# Patient Record
Sex: FEMALE | Race: WHITE | Employment: FULL TIME | URBAN - METROPOLITAN AREA
[De-identification: names, ages, dates, MRNs, and addresses within clinical notes are randomized per-mention and may not be internally consistent; named-entity substitution may affect disease eponyms.]

---

## 2019-06-13 ENCOUNTER — OFFICE VISIT (OUTPATIENT)
Dept: PODIATRY | Age: 54
End: 2019-06-13
Payer: COMMERCIAL

## 2019-06-13 VITALS — BODY MASS INDEX: 24.84 KG/M2 | HEIGHT: 62 IN | WEIGHT: 135 LBS

## 2019-06-13 DIAGNOSIS — Q66.222 METATARSUS ADDUCTUS OF LEFT FOOT: Primary | ICD-10-CM

## 2019-06-13 DIAGNOSIS — Q66.221 METATARSUS ADDUCTUS OF RIGHT FOOT: ICD-10-CM

## 2019-06-13 DIAGNOSIS — M79.672 PAIN IN LEFT FOOT: ICD-10-CM

## 2019-06-13 DIAGNOSIS — M79.671 PAIN IN RIGHT FOOT: ICD-10-CM

## 2019-06-13 PROCEDURE — 99203 OFFICE O/P NEW LOW 30 MIN: CPT | Performed by: PODIATRIST

## 2019-06-13 RX ORDER — CYCLOBENZAPRINE HCL 10 MG
TABLET ORAL
Refills: 2 | COMMUNITY
Start: 2019-05-19 | End: 2019-07-16

## 2019-06-13 RX ORDER — ALPRAZOLAM 1 MG/1
TABLET ORAL
Refills: 0 | COMMUNITY
Start: 2019-05-23 | End: 2019-07-16

## 2019-06-13 RX ORDER — PHENOBARBITAL 64.8 MG/1
TABLET ORAL
Refills: 0 | COMMUNITY
Start: 2019-05-20 | End: 2019-07-16

## 2019-06-13 RX ORDER — IBUPROFEN 800 MG/1
TABLET ORAL
Refills: 2 | COMMUNITY
Start: 2019-05-19 | End: 2019-12-02 | Stop reason: SDUPTHER

## 2019-06-13 RX ORDER — SUMATRIPTAN 100 MG/1
TABLET, FILM COATED ORAL
Refills: 2 | COMMUNITY
Start: 2019-05-19 | End: 2019-07-16

## 2019-06-13 ASSESSMENT — ENCOUNTER SYMPTOMS
NAUSEA: 0
SHORTNESS OF BREATH: 0
COLOR CHANGE: 0
DIARRHEA: 0
BACK PAIN: 0

## 2019-06-13 NOTE — PROGRESS NOTES
Comments)     Seizures      Phenytoin Sodium Extended Other (See Comments)     \"Causes seizure\"    Topiramate Nausea Only and Nausea And Vomiting     unknown    Verapamil Nausea Only and Nausea And Vomiting    Sulfamethoxazole-Trimethoprim Hives    Amoxicillin        History reviewed. No pertinent past medical history. Prior to Admission medications    Medication Sig Start Date End Date Taking? Authorizing Provider   SUMAtriptan (IMITREX) 100 MG tablet  5/19/19  Yes Historical Provider, MD   PHENobarbital (LUMINAL) 64.8 MG tablet TK 1 T PO BID 5/20/19  Yes Historical Provider, MD   ibuprofen (ADVIL;MOTRIN) 800 MG tablet TK 1 T PO TID PRN 5/19/19  Yes Historical Provider, MD   cyclobenzaprine (FLEXERIL) 10 MG tablet TK 1 T PO TID PRN 5/19/19  Yes Historical Provider, MD   ALPRAZolam Bailey Gasper) 1 MG tablet TK 1 T PO  BID PRN 5/23/19  Yes Historical Provider, MD       History reviewed. No pertinent surgical history. History reviewed. No pertinent family history. Social History     Tobacco Use    Smoking status: Never Smoker    Smokeless tobacco: Never Used   Substance Use Topics    Alcohol use: Not on file       Review of Systems   Constitutional: Negative for activity change, appetite change, chills, diaphoresis, fatigue and fever. Respiratory: Negative for shortness of breath. Cardiovascular: Negative for leg swelling. Gastrointestinal: Negative for diarrhea and nausea. Endocrine: Negative for cold intolerance, heat intolerance and polyuria. Musculoskeletal: Positive for arthralgias and gait problem. Negative for back pain, joint swelling and myalgias. Skin: Negative for color change, pallor, rash and wound. Allergic/Immunologic: Negative for environmental allergies and food allergies. Neurological: Negative for dizziness, weakness, light-headedness and numbness. Hematological: Does not bruise/bleed easily.    Psychiatric/Behavioral: Negative for behavioral problems, confusion and self-injury. The patient is not nervous/anxious. Vitals: There were no vitals filed for this visit. General: AAO x 3 in NAD. Integument: There are no rashes, ulcers, or breaks in the skin noted to the bilateral lower extremities. There is no induration, subcutaneous nodules, or tightening of the skin noted to the bilateral.     Toenails 1-5 of the right foot do not present with thickness, elongation, discoloration, brittleness, subungual debris. Toenails 1-5 of the left foot do not present with thickness, elongation, discoloration, brittleness, subungual debris. Interdigital maceration absent to web spaces 1-4, Bilateral.     There are no preulcerative lesions noted to the right foot. There are no preulcerative lesions noted to the left foot. The skin to the bilateral feet is not thin and shiny. The skin to the bilateral feet is  warm, supple, and dry. Vascular: DP pulse of the right foot is  palpable. DP pulse of the left foot is  palpable. PT pulse of the right foot is  palpable. PT pulse of the left foot is  palpable. CFT is less than 3 secs to the digits of the right foot. CFT is less than 3 secs to the digits of the left foot. There is no edema noted to the bilateral foot or ankle. There is hair growth noted to the digits of the bilateral feet. There are no varicosities noted to the right foot/ankle. There are no varicosities noted to the left foot/ankle. Erythema is absent to the bilateral feet. Neurological: Reflexes are present to the right plantar foot and to the Achilles tendon. Reflexes are present to the left plantar foot and to the Achilles tendon. Epicritic sensation is  intact to the right foot. Epicritic sensation is  intact to the left foot. Musculoskeletal:  Muscle strength is +5/5 to all four muscle groups of the right lower extremity and +5/5 to all four muscle groups of the left lower extremity. There adduction noted to the forefoot at the level of the tarsal-metatarsal joints bilaterally. This deformity is rigid bilaterally. There is pain with manipulation and attempt at reduction of this deformity bilaterally. Range of motion to the right ankle is  free of pain or grinding. Range of motion to the left ankle is  free of pain or grinding. Range of motion to the right subtalar joint is  free of pain or grinding. Range of motion to the left subtalar joint is  free of pain or grinding. No abnormalities, asymmetries, or misalignments are seen between the extremities. Weightbearing evaluation reveals a forefoot adduction and an increase in the medial longitudinal arch height. The digits of the right foot are contracted. The digits of the left foot are contracted. There is no prominence noted to the first metatarsal head without abduction of the hallux of the right foot. There is no prominence noted to the first metatarsal head without abduction of the hallux of the left foot. Shoe examination was performed. Biomechanical Exam: normal bilaterally. X-ray's taken: AP, Lateral, and Medial Oblique of the bilateral foot. Findings: There is adduction noted to the metatarsals of the bilateral feet. There is significant loss and incongruity to the joint spaces of the tarsal metatarsal joints and the tarsal joints of the bilateral feet. Asessment: Patient is a 46 y.o. female with:    Diagnosis Orders   1. Metatarsus adductus of left foot  XR FOOT LEFT (MIN 3 VIEWS)   2. Metatarsus adductus of right foot  XR FOOT RIGHT (MIN 3 VIEWS)   3. Pain in left foot  XR FOOT LEFT (MIN 3 VIEWS)   4. Pain in right foot  XR FOOT RIGHT (MIN 3 VIEWS)       Plan:  1. Clinical evaluation of the patient. 2. Patient informed that I recommend orthotics to aid in decreasing her pain.  Patient informed that I recommend accommodative rather than functional orthotics as functional orthotics will cause her more pain. I educated the patient as to the difference between these two types of orthotics. Patient informed that I do not recommend surgery as this would not really benefit her due to the diffuse arthritis that she has. Patient states that she understands this. Foam impressions of the patient's feet were made today for fabrication of the orthotics. Patient will be contacted when these have been received. 3. Contact office with any questions/problems/concerns. Return if symptoms worsen or fail to improve.    6/13/2019      Luis Fernando Diamond DPM

## 2019-07-16 ENCOUNTER — OFFICE VISIT (OUTPATIENT)
Dept: PRIMARY CARE CLINIC | Age: 54
End: 2019-07-16
Payer: COMMERCIAL

## 2019-07-16 VITALS
OXYGEN SATURATION: 97 % | HEIGHT: 62 IN | DIASTOLIC BLOOD PRESSURE: 80 MMHG | SYSTOLIC BLOOD PRESSURE: 132 MMHG | WEIGHT: 146.8 LBS | BODY MASS INDEX: 27.02 KG/M2 | HEART RATE: 82 BPM

## 2019-07-16 DIAGNOSIS — Z00.00 ANNUAL PHYSICAL EXAM: ICD-10-CM

## 2019-07-16 DIAGNOSIS — M15.9 PRIMARY OSTEOARTHRITIS INVOLVING MULTIPLE JOINTS: ICD-10-CM

## 2019-07-16 DIAGNOSIS — Z13.220 ENCOUNTER FOR LIPID SCREENING FOR CARDIOVASCULAR DISEASE: Primary | ICD-10-CM

## 2019-07-16 DIAGNOSIS — Z13.6 ENCOUNTER FOR LIPID SCREENING FOR CARDIOVASCULAR DISEASE: Primary | ICD-10-CM

## 2019-07-16 DIAGNOSIS — F41.1 GENERALIZED ANXIETY DISORDER: ICD-10-CM

## 2019-07-16 DIAGNOSIS — R63.5 UNEXPLAINED WEIGHT GAIN: ICD-10-CM

## 2019-07-16 DIAGNOSIS — M54.50 CHRONIC BILATERAL LOW BACK PAIN WITHOUT SCIATICA: ICD-10-CM

## 2019-07-16 DIAGNOSIS — G43.009 MIGRAINE WITHOUT AURA AND WITHOUT STATUS MIGRAINOSUS, NOT INTRACTABLE: ICD-10-CM

## 2019-07-16 DIAGNOSIS — G40.909 SEIZURE DISORDER (HCC): ICD-10-CM

## 2019-07-16 DIAGNOSIS — G89.29 CHRONIC BILATERAL LOW BACK PAIN WITHOUT SCIATICA: ICD-10-CM

## 2019-07-16 DIAGNOSIS — Z12.11 ENCOUNTER FOR SCREENING FOR MALIGNANT NEOPLASM OF COLON: ICD-10-CM

## 2019-07-16 PROBLEM — M15.0 PRIMARY OSTEOARTHRITIS INVOLVING MULTIPLE JOINTS: Status: ACTIVE | Noted: 2019-07-16

## 2019-07-16 PROCEDURE — 99204 OFFICE O/P NEW MOD 45 MIN: CPT | Performed by: FAMILY MEDICINE

## 2019-07-16 RX ORDER — CYCLOBENZAPRINE HCL 10 MG
TABLET ORAL
Qty: 60 TABLET | Refills: 2 | Status: SHIPPED | OUTPATIENT
Start: 2019-07-16 | End: 2019-12-02 | Stop reason: SDUPTHER

## 2019-07-16 RX ORDER — PHENOBARBITAL 64.8 MG/1
TABLET ORAL
Qty: 60 TABLET | Refills: 5 | Status: SHIPPED | OUTPATIENT
Start: 2019-07-16 | End: 2019-12-02 | Stop reason: SDUPTHER

## 2019-07-16 RX ORDER — SUMATRIPTAN 100 MG/1
100 TABLET, FILM COATED ORAL DAILY PRN
Qty: 9 TABLET | Refills: 5 | Status: SHIPPED | OUTPATIENT
Start: 2019-07-16 | End: 2019-12-02 | Stop reason: SDUPTHER

## 2019-07-16 RX ORDER — ALPRAZOLAM 1 MG/1
TABLET ORAL
Qty: 60 TABLET | Refills: 2 | Status: SHIPPED | OUTPATIENT
Start: 2019-07-16 | End: 2019-12-02 | Stop reason: SDUPTHER

## 2019-07-16 SDOH — HEALTH STABILITY: MENTAL HEALTH: HOW OFTEN DO YOU HAVE A DRINK CONTAINING ALCOHOL?: NEVER

## 2019-07-16 ASSESSMENT — ENCOUNTER SYMPTOMS
ABDOMINAL PAIN: 0
NAUSEA: 0
EYE DISCHARGE: 0
SHORTNESS OF BREATH: 0
DIARRHEA: 0
WHEEZING: 0
EYE REDNESS: 0
COUGH: 0
SORE THROAT: 0
RHINORRHEA: 0
VOMITING: 0

## 2019-07-16 ASSESSMENT — PATIENT HEALTH QUESTIONNAIRE - PHQ9
1. LITTLE INTEREST OR PLEASURE IN DOING THINGS: 0
2. FEELING DOWN, DEPRESSED OR HOPELESS: 0
SUM OF ALL RESPONSES TO PHQ QUESTIONS 1-9: 0
SUM OF ALL RESPONSES TO PHQ9 QUESTIONS 1 & 2: 0
SUM OF ALL RESPONSES TO PHQ QUESTIONS 1-9: 0

## 2019-07-17 ENCOUNTER — TELEPHONE (OUTPATIENT)
Dept: PODIATRY | Age: 54
End: 2019-07-17

## 2019-08-14 ENCOUNTER — HOSPITAL ENCOUNTER (OUTPATIENT)
Age: 54
Setting detail: SPECIMEN
Discharge: HOME | End: 2019-08-14
Payer: COMMERCIAL

## 2019-08-14 ENCOUNTER — OFFICE VISIT (OUTPATIENT)
Dept: PRIMARY CARE CLINIC | Age: 54
End: 2019-08-14
Payer: COMMERCIAL

## 2019-08-14 VITALS
OXYGEN SATURATION: 99 % | HEART RATE: 87 BPM | BODY MASS INDEX: 25.79 KG/M2 | TEMPERATURE: 98 F | DIASTOLIC BLOOD PRESSURE: 88 MMHG | SYSTOLIC BLOOD PRESSURE: 126 MMHG | WEIGHT: 141.2 LBS

## 2019-08-14 DIAGNOSIS — Z00.00 ANNUAL PHYSICAL EXAM: ICD-10-CM

## 2019-08-14 DIAGNOSIS — R63.5 UNEXPLAINED WEIGHT GAIN: ICD-10-CM

## 2019-08-14 DIAGNOSIS — Z13.220 ENCOUNTER FOR LIPID SCREENING FOR CARDIOVASCULAR DISEASE: ICD-10-CM

## 2019-08-14 DIAGNOSIS — Z13.6 ENCOUNTER FOR LIPID SCREENING FOR CARDIOVASCULAR DISEASE: ICD-10-CM

## 2019-08-14 DIAGNOSIS — J20.9 ACUTE BRONCHITIS, UNSPECIFIED ORGANISM: Primary | ICD-10-CM

## 2019-08-14 LAB
ANION GAP SERPL CALCULATED.3IONS-SCNC: 12 MMOL/L (ref 9–17)
BUN BLDV-MCNC: 4 MG/DL (ref 6–20)
BUN/CREAT BLD: ABNORMAL (ref 9–20)
CALCIUM SERPL-MCNC: 9 MG/DL (ref 8.6–10.4)
CHLORIDE BLD-SCNC: 101 MMOL/L (ref 98–107)
CHOLESTEROL, FASTING: 253 MG/DL
CHOLESTEROL/HDL RATIO: 3.4
CO2: 27 MMOL/L (ref 20–31)
CORTISOL COLLECTION INFO: 825
CORTISOL: 12.8 UG/DL (ref 2.7–18.4)
CREAT SERPL-MCNC: 0.47 MG/DL (ref 0.5–0.9)
GFR AFRICAN AMERICAN: >60 ML/MIN
GFR NON-AFRICAN AMERICAN: >60 ML/MIN
GFR SERPL CREATININE-BSD FRML MDRD: ABNORMAL ML/MIN/{1.73_M2}
GFR SERPL CREATININE-BSD FRML MDRD: ABNORMAL ML/MIN/{1.73_M2}
GLUCOSE FASTING: 81 MG/DL (ref 70–99)
HDLC SERPL-MCNC: 75 MG/DL
HEPATITIS C ANTIBODY: NONREACTIVE
LDL CHOLESTEROL: 162 MG/DL (ref 0–130)
POTASSIUM SERPL-SCNC: 4.1 MMOL/L (ref 3.7–5.3)
SODIUM BLD-SCNC: 140 MMOL/L (ref 135–144)
THYROXINE, FREE: 1.42 NG/DL (ref 0.93–1.7)
TRIGLYCERIDE, FASTING: 79 MG/DL
TSH SERPL DL<=0.05 MIU/L-ACNC: 0.58 MIU/L (ref 0.3–5)
VLDLC SERPL CALC-MCNC: ABNORMAL MG/DL (ref 1–30)

## 2019-08-14 PROCEDURE — 99213 OFFICE O/P EST LOW 20 MIN: CPT | Performed by: PHYSICIAN ASSISTANT

## 2019-08-14 RX ORDER — AZITHROMYCIN 250 MG/1
TABLET, FILM COATED ORAL
Qty: 1 PACKET | Refills: 0 | Status: SHIPPED | OUTPATIENT
Start: 2019-08-14 | End: 2019-08-24

## 2019-08-14 RX ORDER — MECLIZINE HCL 12.5 MG/1
12.5 TABLET ORAL 3 TIMES DAILY PRN
COMMUNITY
End: 2019-12-02 | Stop reason: SDUPTHER

## 2019-08-14 RX ORDER — BENZONATATE 200 MG/1
200 CAPSULE ORAL 3 TIMES DAILY PRN
Qty: 30 CAPSULE | Refills: 0 | Status: SHIPPED | OUTPATIENT
Start: 2019-08-14 | End: 2019-12-02

## 2019-08-14 ASSESSMENT — ENCOUNTER SYMPTOMS
WHEEZING: 0
SHORTNESS OF BREATH: 0
COUGH: 1
CHEST TIGHTNESS: 1

## 2019-08-14 NOTE — PROGRESS NOTES
(improved). Respiratory: Positive for cough and chest tightness. Negative for shortness of breath and wheezing. Objective:     /88   Pulse 87   Temp 98 °F (36.7 °C)   Wt 141 lb 3.2 oz (64 kg)   SpO2 99%   BMI 25.79 kg/m²   Physical Exam   Constitutional: She appears well-developed and well-nourished. No distress. HENT:   Head: Normocephalic and atraumatic. Right Ear: Tympanic membrane, external ear and ear canal normal.   Left Ear: Tympanic membrane, external ear and ear canal normal.   Nose: Right sinus exhibits no maxillary sinus tenderness and no frontal sinus tenderness. Left sinus exhibits no maxillary sinus tenderness and no frontal sinus tenderness. Mouth/Throat: No oropharyngeal exudate. Cardiovascular: Normal rate, regular rhythm and normal heart sounds. Pulmonary/Chest: Effort normal and breath sounds normal. No respiratory distress. She has no wheezes. Lymphadenopathy:     She has cervical adenopathy (left side). Skin: Skin is warm and dry. No rash noted. She is not diaphoretic. Nursing note and vitals reviewed. Assessment:       Diagnosis Orders   1. Acute bronchitis, unspecified organism  azithromycin (ZITHROMAX) 250 MG tablet    benzonatate (TESSALON) 200 MG capsule        Plan:    Rx for z-rosy and Tessalon Perles prn for cough. Return if symptoms worsen or fail to improve. No orders of the defined types were placed in this encounter. Orders Placed This Encounter   Medications    azithromycin (ZITHROMAX) 250 MG tablet     Si tablets now then 1 daily until gone. Dispense:  1 packet     Refill:  0    benzonatate (TESSALON) 200 MG capsule     Sig: Take 1 capsule by mouth 3 times daily as needed for Cough     Dispense:  30 capsule     Refill:  0       Patient given educationalmaterials - see patient instructions. Discussed use, benefit, and side effectsof prescribed medications. All patient questions answered. Pt voiced understanding. Reviewed health maintenance. Instructed to continue current medications, diet andexercise. Patient agreed with treatment plan. Follow up as directed.      Electronicallysigned by Hoda Thomas PA-C on 8/21/2019 at 1:57 AM

## 2019-08-21 ASSESSMENT — ENCOUNTER SYMPTOMS
SINUS PRESSURE: 0
RHINORRHEA: 1

## 2019-12-02 ENCOUNTER — HOSPITAL ENCOUNTER (OUTPATIENT)
Age: 54
Discharge: HOME | End: 2019-12-04
Payer: COMMERCIAL

## 2019-12-02 ENCOUNTER — HOSPITAL ENCOUNTER (OUTPATIENT)
Age: 54
Discharge: HOME | End: 2019-12-02
Payer: COMMERCIAL

## 2019-12-02 ENCOUNTER — OFFICE VISIT (OUTPATIENT)
Dept: PRIMARY CARE CLINIC | Age: 54
End: 2019-12-02
Payer: COMMERCIAL

## 2019-12-02 ENCOUNTER — HOSPITAL ENCOUNTER (OUTPATIENT)
Dept: GENERAL RADIOLOGY | Age: 54
Discharge: HOME | End: 2019-12-04
Payer: COMMERCIAL

## 2019-12-02 VITALS
DIASTOLIC BLOOD PRESSURE: 86 MMHG | BODY MASS INDEX: 25.5 KG/M2 | HEART RATE: 97 BPM | SYSTOLIC BLOOD PRESSURE: 122 MMHG | OXYGEN SATURATION: 98 % | WEIGHT: 139.6 LBS

## 2019-12-02 DIAGNOSIS — G40.909 SEIZURE DISORDER (HCC): ICD-10-CM

## 2019-12-02 DIAGNOSIS — R30.0 DYSURIA: ICD-10-CM

## 2019-12-02 DIAGNOSIS — R42 CHRONIC VERTIGO: ICD-10-CM

## 2019-12-02 DIAGNOSIS — G43.009 MIGRAINE WITHOUT AURA AND WITHOUT STATUS MIGRAINOSUS, NOT INTRACTABLE: ICD-10-CM

## 2019-12-02 DIAGNOSIS — M54.50 CHRONIC BILATERAL LOW BACK PAIN WITHOUT SCIATICA: ICD-10-CM

## 2019-12-02 DIAGNOSIS — F41.1 GENERALIZED ANXIETY DISORDER: ICD-10-CM

## 2019-12-02 DIAGNOSIS — M15.9 PRIMARY OSTEOARTHRITIS INVOLVING MULTIPLE JOINTS: ICD-10-CM

## 2019-12-02 DIAGNOSIS — Z01.818 PRE-OPERATIVE CLEARANCE: ICD-10-CM

## 2019-12-02 DIAGNOSIS — G89.29 CHRONIC BILATERAL LOW BACK PAIN WITHOUT SCIATICA: ICD-10-CM

## 2019-12-02 DIAGNOSIS — Z01.818 PRE-OPERATIVE CLEARANCE: Primary | ICD-10-CM

## 2019-12-02 PROBLEM — M54.2 CHRONIC NECK PAIN: Status: ACTIVE | Noted: 2019-03-26

## 2019-12-02 PROBLEM — H83.2X3 VESTIBULAR HYPOFUNCTION OF BOTH EARS: Status: ACTIVE | Noted: 2018-08-07

## 2019-12-02 PROBLEM — I10 ESSENTIAL HYPERTENSION: Status: ACTIVE | Noted: 2019-03-26

## 2019-12-02 PROBLEM — Z96.659 S/P TOTAL KNEE ARTHROPLASTY: Status: ACTIVE | Noted: 2017-07-12

## 2019-12-02 PROBLEM — E78.01 FAMILIAL HYPERCHOLESTEROLEMIA: Status: ACTIVE | Noted: 2018-04-17

## 2019-12-02 PROBLEM — M62.838 MUSCLE SPASM: Status: ACTIVE | Noted: 2017-07-13

## 2019-12-02 PROBLEM — F41.9 ANXIETY: Status: ACTIVE | Noted: 2017-07-13

## 2019-12-02 PROBLEM — G43.909 MIGRAINE: Status: ACTIVE | Noted: 2017-07-13

## 2019-12-02 LAB
ABSOLUTE EOS #: 0.13 K/UL (ref 0–0.44)
ABSOLUTE IMMATURE GRANULOCYTE: <0.03 K/UL (ref 0–0.3)
ABSOLUTE LYMPH #: 1.87 K/UL (ref 1.1–3.7)
ABSOLUTE MONO #: 0.38 K/UL (ref 0.1–1.2)
ALBUMIN SERPL-MCNC: 4.2 G/DL (ref 3.5–5.2)
ALBUMIN/GLOBULIN RATIO: 1.5 (ref 1–2.5)
ALP BLD-CCNC: 104 U/L (ref 35–104)
ALT SERPL-CCNC: 14 U/L (ref 5–33)
ANION GAP SERPL CALCULATED.3IONS-SCNC: 10 MMOL/L (ref 9–17)
AST SERPL-CCNC: 16 U/L
BASOPHILS # BLD: 1 % (ref 0–2)
BASOPHILS ABSOLUTE: 0.03 K/UL (ref 0–0.2)
BILIRUB SERPL-MCNC: <0.1 MG/DL (ref 0.3–1.2)
BILIRUBIN, POC: NEGATIVE
BLOOD URINE, POC: ABNORMAL
BUN BLDV-MCNC: 16 MG/DL (ref 6–20)
BUN/CREAT BLD: ABNORMAL (ref 9–20)
CALCIUM SERPL-MCNC: 9.1 MG/DL (ref 8.6–10.4)
CHLORIDE BLD-SCNC: 104 MMOL/L (ref 98–107)
CLARITY, POC: ABNORMAL
CO2: 27 MMOL/L (ref 20–31)
COLOR, POC: ABNORMAL
CREAT SERPL-MCNC: 0.46 MG/DL (ref 0.5–0.9)
DIFFERENTIAL TYPE: NORMAL
EOSINOPHILS RELATIVE PERCENT: 2 % (ref 1–4)
GFR AFRICAN AMERICAN: >60 ML/MIN
GFR NON-AFRICAN AMERICAN: >60 ML/MIN
GFR SERPL CREATININE-BSD FRML MDRD: ABNORMAL ML/MIN/{1.73_M2}
GFR SERPL CREATININE-BSD FRML MDRD: ABNORMAL ML/MIN/{1.73_M2}
GLUCOSE BLD-MCNC: 84 MG/DL (ref 70–99)
GLUCOSE URINE, POC: NEGATIVE
HCT VFR BLD CALC: 45.7 % (ref 36.3–47.1)
HEMOGLOBIN: 14.4 G/DL (ref 11.9–15.1)
IMMATURE GRANULOCYTES: 0 %
KETONES, POC: NEGATIVE
LEUKOCYTE EST, POC: NEGATIVE
LYMPHOCYTES # BLD: 32 % (ref 24–43)
MCH RBC QN AUTO: 28.7 PG (ref 25.2–33.5)
MCHC RBC AUTO-ENTMCNC: 31.5 G/DL (ref 28.4–34.8)
MCV RBC AUTO: 91.2 FL (ref 82.6–102.9)
MONOCYTES # BLD: 7 % (ref 3–12)
NITRITE, POC: ABNORMAL
NRBC AUTOMATED: 0 PER 100 WBC
PDW BLD-RTO: 13.8 % (ref 11.8–14.4)
PH, POC: 7
PLATELET # BLD: 329 K/UL (ref 138–453)
PLATELET ESTIMATE: NORMAL
PMV BLD AUTO: 10.6 FL (ref 8.1–13.5)
POTASSIUM SERPL-SCNC: 4.6 MMOL/L (ref 3.7–5.3)
PROTEIN, POC: NEGATIVE
RBC # BLD: 5.01 M/UL (ref 3.95–5.11)
RBC # BLD: NORMAL 10*6/UL
SEG NEUTROPHILS: 58 % (ref 36–65)
SEGMENTED NEUTROPHILS ABSOLUTE COUNT: 3.44 K/UL (ref 1.5–8.1)
SODIUM BLD-SCNC: 141 MMOL/L (ref 135–144)
SPECIFIC GRAVITY, POC: 1.01
TOTAL PROTEIN: 7 G/DL (ref 6.4–8.3)
UROBILINOGEN, POC: ABNORMAL
WBC # BLD: 5.9 K/UL (ref 3.5–11.3)
WBC # BLD: NORMAL 10*3/UL

## 2019-12-02 PROCEDURE — 71046 X-RAY EXAM CHEST 2 VIEWS: CPT

## 2019-12-02 PROCEDURE — 80053 COMPREHEN METABOLIC PANEL: CPT

## 2019-12-02 PROCEDURE — 99215 OFFICE O/P EST HI 40 MIN: CPT | Performed by: NURSE PRACTITIONER

## 2019-12-02 PROCEDURE — 93005 ELECTROCARDIOGRAM TRACING: CPT | Performed by: FAMILY MEDICINE

## 2019-12-02 PROCEDURE — 36415 COLL VENOUS BLD VENIPUNCTURE: CPT

## 2019-12-02 PROCEDURE — 81003 URINALYSIS AUTO W/O SCOPE: CPT | Performed by: NURSE PRACTITIONER

## 2019-12-02 PROCEDURE — 85025 COMPLETE CBC W/AUTO DIFF WBC: CPT

## 2019-12-02 RX ORDER — PHENOBARBITAL 64.8 MG/1
TABLET ORAL
Qty: 60 TABLET | Refills: 5 | Status: SHIPPED | OUTPATIENT
Start: 2019-12-02 | End: 2020-06-29

## 2019-12-02 RX ORDER — ALPRAZOLAM 1 MG/1
TABLET ORAL
Qty: 60 TABLET | Refills: 0 | Status: SHIPPED | OUTPATIENT
Start: 2019-12-02 | End: 2020-01-08

## 2019-12-02 RX ORDER — SUMATRIPTAN 100 MG/1
100 TABLET, FILM COATED ORAL DAILY PRN
Qty: 9 TABLET | Refills: 2 | Status: SHIPPED | OUTPATIENT
Start: 2019-12-02 | End: 2020-05-13 | Stop reason: SDUPTHER

## 2019-12-02 RX ORDER — CYCLOBENZAPRINE HCL 10 MG
TABLET ORAL
Qty: 60 TABLET | Refills: 2 | Status: SHIPPED | OUTPATIENT
Start: 2019-12-02 | End: 2020-05-01

## 2019-12-02 RX ORDER — MECLIZINE HCL 12.5 MG/1
12.5 TABLET ORAL 3 TIMES DAILY PRN
Qty: 45 TABLET | Refills: 2 | Status: ON HOLD | OUTPATIENT
Start: 2019-12-02 | End: 2022-02-16 | Stop reason: HOSPADM

## 2019-12-02 RX ORDER — IBUPROFEN 800 MG/1
TABLET ORAL
Qty: 120 TABLET | Refills: 2 | Status: SHIPPED | OUTPATIENT
Start: 2019-12-02 | End: 2021-07-13 | Stop reason: SDUPTHER

## 2019-12-02 RX ORDER — CIPROFLOXACIN 500 MG/1
500 TABLET, FILM COATED ORAL 2 TIMES DAILY
Qty: 10 TABLET | Refills: 0 | Status: SHIPPED | OUTPATIENT
Start: 2019-12-02 | End: 2019-12-07

## 2019-12-02 ASSESSMENT — ENCOUNTER SYMPTOMS
NAUSEA: 0
BACK PAIN: 1
VOMITING: 0
DIARRHEA: 0
COUGH: 0
SORE THROAT: 0
EYE REDNESS: 0
EYE DISCHARGE: 0
SHORTNESS OF BREATH: 0
WHEEZING: 0

## 2019-12-03 LAB
EKG ATRIAL RATE: 71 BPM
EKG P AXIS: 27 DEGREES
EKG P-R INTERVAL: 118 MS
EKG Q-T INTERVAL: 396 MS
EKG QRS DURATION: 72 MS
EKG QTC CALCULATION (BAZETT): 430 MS
EKG R AXIS: 33 DEGREES
EKG T AXIS: 56 DEGREES
EKG VENTRICULAR RATE: 71 BPM

## 2019-12-09 ENCOUNTER — TELEPHONE (OUTPATIENT)
Dept: PRIMARY CARE CLINIC | Age: 54
End: 2019-12-09

## 2019-12-16 ENCOUNTER — TELEPHONE (OUTPATIENT)
Dept: PRIMARY CARE CLINIC | Age: 54
End: 2019-12-16

## 2019-12-16 NOTE — TELEPHONE ENCOUNTER
June from [address] calling to report pt's heart rate today was 110. Thinks it's probably due to her pain from rt knee replacement on thurs. Pt stated she is feeling fine.  Questions/concerns call [phone]

## 2019-12-22 ENCOUNTER — TELEPHONE (OUTPATIENT)
Dept: PRIMARY CARE CLINIC | Age: 54
End: 2019-12-22

## 2019-12-23 ENCOUNTER — HOSPITAL ENCOUNTER (OUTPATIENT)
Age: 54
Setting detail: SPECIMEN
Discharge: HOME | End: 2019-12-23
Payer: COMMERCIAL

## 2019-12-23 LAB
ANION GAP SERPL CALCULATED.3IONS-SCNC: 12 MMOL/L (ref 9–17)
BUN BLDV-MCNC: 8 MG/DL (ref 6–20)
BUN/CREAT BLD: ABNORMAL (ref 9–20)
CALCIUM SERPL-MCNC: 9.8 MG/DL (ref 8.6–10.4)
CHLORIDE BLD-SCNC: 101 MMOL/L (ref 98–107)
CO2: 28 MMOL/L (ref 20–31)
CREAT SERPL-MCNC: 0.47 MG/DL (ref 0.5–0.9)
GFR AFRICAN AMERICAN: >60 ML/MIN
GFR NON-AFRICAN AMERICAN: >60 ML/MIN
GFR SERPL CREATININE-BSD FRML MDRD: ABNORMAL ML/MIN/{1.73_M2}
GFR SERPL CREATININE-BSD FRML MDRD: ABNORMAL ML/MIN/{1.73_M2}
GLUCOSE BLD-MCNC: 98 MG/DL (ref 70–99)
HCT VFR BLD CALC: 38.6 % (ref 36–46)
HEMOGLOBIN: 13 G/DL (ref 12–16)
MCH RBC QN AUTO: 28.7 PG (ref 26–34)
MCHC RBC AUTO-ENTMCNC: 33.6 G/DL (ref 31–37)
MCV RBC AUTO: 85.5 FL (ref 80–100)
NRBC AUTOMATED: NORMAL PER 100 WBC
PDW BLD-RTO: 14.1 % (ref 11.5–14.9)
PLATELET # BLD: 403 K/UL (ref 150–450)
PMV BLD AUTO: 7.4 FL (ref 6–12)
POTASSIUM SERPL-SCNC: 3.8 MMOL/L (ref 3.7–5.3)
RBC # BLD: 4.51 M/UL (ref 4–5.2)
SODIUM BLD-SCNC: 141 MMOL/L (ref 135–144)
WBC # BLD: 5.4 K/UL (ref 3.5–11)

## 2019-12-23 PROCEDURE — 80048 BASIC METABOLIC PNL TOTAL CA: CPT

## 2019-12-23 PROCEDURE — 85027 COMPLETE CBC AUTOMATED: CPT

## 2020-01-08 RX ORDER — ALPRAZOLAM 1 MG/1
TABLET ORAL
Qty: 60 TABLET | Refills: 2 | Status: SHIPPED | OUTPATIENT
Start: 2020-01-08 | End: 2020-05-04 | Stop reason: SDUPTHER

## 2020-02-18 ENCOUNTER — NURSE ONLY (OUTPATIENT)
Dept: PODIATRY | Age: 55
End: 2020-02-18

## 2020-03-02 ENCOUNTER — OFFICE VISIT (OUTPATIENT)
Dept: PRIMARY CARE CLINIC | Age: 55
End: 2020-03-02
Payer: COMMERCIAL

## 2020-03-02 VITALS
WEIGHT: 139 LBS | BODY MASS INDEX: 25.58 KG/M2 | DIASTOLIC BLOOD PRESSURE: 98 MMHG | OXYGEN SATURATION: 96 % | HEIGHT: 62 IN | HEART RATE: 88 BPM | SYSTOLIC BLOOD PRESSURE: 148 MMHG

## 2020-03-02 PROCEDURE — 3017F COLORECTAL CA SCREEN DOC REV: CPT | Performed by: FAMILY MEDICINE

## 2020-03-02 PROCEDURE — G8427 DOCREV CUR MEDS BY ELIG CLIN: HCPCS | Performed by: FAMILY MEDICINE

## 2020-03-02 PROCEDURE — G8484 FLU IMMUNIZE NO ADMIN: HCPCS | Performed by: FAMILY MEDICINE

## 2020-03-02 PROCEDURE — 99214 OFFICE O/P EST MOD 30 MIN: CPT | Performed by: FAMILY MEDICINE

## 2020-03-02 PROCEDURE — G8419 CALC BMI OUT NRM PARAM NOF/U: HCPCS | Performed by: FAMILY MEDICINE

## 2020-03-02 PROCEDURE — 1036F TOBACCO NON-USER: CPT | Performed by: FAMILY MEDICINE

## 2020-03-02 ASSESSMENT — ENCOUNTER SYMPTOMS
EYE REDNESS: 0
RHINORRHEA: 0
BACK PAIN: 1
SORE THROAT: 0
ABDOMINAL PAIN: 0
WHEEZING: 0
SHORTNESS OF BREATH: 0
VOMITING: 0
NAUSEA: 0
EYE DISCHARGE: 0
COUGH: 0
DIARRHEA: 0

## 2020-03-02 ASSESSMENT — PATIENT HEALTH QUESTIONNAIRE - PHQ9
SUM OF ALL RESPONSES TO PHQ QUESTIONS 1-9: 0
1. LITTLE INTEREST OR PLEASURE IN DOING THINGS: 0
2. FEELING DOWN, DEPRESSED OR HOPELESS: 0
SUM OF ALL RESPONSES TO PHQ QUESTIONS 1-9: 0
SUM OF ALL RESPONSES TO PHQ9 QUESTIONS 1 & 2: 0

## 2020-03-02 NOTE — PROGRESS NOTES
Negative for cough, shortness of breath and wheezing. Cardiovascular: Negative for chest pain and palpitations. Gastrointestinal: Negative for abdominal pain, diarrhea, nausea and vomiting. Genitourinary: Negative for dysuria and frequency. Musculoskeletal: Positive for back pain. Negative for arthralgias and myalgias. Neurological: Positive for headaches. Negative for dizziness and light-headedness. Psychiatric/Behavioral: Negative for sleep disturbance. Objective:     BP (!) 148/98   Pulse 88   Ht 5' 2.04\" (1.576 m)   Wt 139 lb (63 kg)   SpO2 96%   BMI 25.39 kg/m²   Physical Exam  Vitals signs and nursing note reviewed. Constitutional:       General: She is not in acute distress. Appearance: She is well-developed. HENT:      Head: Normocephalic and atraumatic. Eyes:      General: No scleral icterus. Right eye: No discharge. Left eye: No discharge. Conjunctiva/sclera: Conjunctivae normal.      Pupils: Pupils are equal, round, and reactive to light. Neck:      Thyroid: No thyromegaly. Trachea: No tracheal deviation. Cardiovascular:      Rate and Rhythm: Normal rate and regular rhythm. Heart sounds: Normal heart sounds. Comments: No carotid bruits  Pulmonary:      Effort: Pulmonary effort is normal. No respiratory distress. Breath sounds: Normal breath sounds. No wheezing. Musculoskeletal:      Comments: Weakness to leg extension noted on the right leg. No pain was produced. Lymphadenopathy:      Cervical: No cervical adenopathy. Skin:     General: Skin is warm. Findings: No rash. Neurological:      Mental Status: She is alert and oriented to person, place, and time. Comments: Absent patellar deep tendon reflex on the right, normal on the left. Psychiatric:         Behavior: Behavior normal.         Thought Content: Thought content normal.         Assessment:       Diagnosis Orders   1.  Encounter for screening mammogram for malignant neoplasm of breast  DORENE DIGITAL SCREEN W OR WO CAD BILATERAL   2. Weakness of right lower extremity  CBC With Auto Differential    T4, Free    TSH    MRI BRAIN WO CONTRAST    MRI LUMBAR SPINE WO CONTRAST    LADY Screen With Reflex    CK    Hepatic Function Panel   3. Neuropathy of right lower extremity  MRI BRAIN WO CONTRAST    MRI LUMBAR SPINE WO CONTRAST   4. Headache, worsening  MRI BRAIN WO CONTRAST   5. Fatigue, unspecified type  CBC With Auto Differential    T4, Free    TSH        Plan:    Work ordered. Mammogram ordered. MRI brain to rule out MS as etiology. MRI lumbar spine for leg weakness and numbness. If all above tests are normal, would need EMG. Return in about 4 weeks (around 3/30/2020).     Orders Placed This Encounter   Procedures    DORENE DIGITAL SCREEN W OR WO CAD BILATERAL     Standing Status:   Future     Standing Expiration Date:   5/2/2021     Order Specific Question:   Reason for exam:     Answer:   screen    MRI BRAIN WO CONTRAST     Standing Status:   Future     Standing Expiration Date:   3/2/2021     Order Specific Question:   Reason for exam:     Answer:   headaches worsening, r/o MS    MRI LUMBAR SPINE WO CONTRAST     Standing Status:   Future     Standing Expiration Date:   3/2/2021     Order Specific Question:   Reason for exam:     Answer:   leg weakness, absent dtr on right    CBC With Auto Differential     Standing Status:   Future     Standing Expiration Date:   3/2/2021    T4, Free     Standing Status:   Future     Standing Expiration Date:   3/2/2021    TSH     Standing Status:   Future     Standing Expiration Date:   3/2/2021    LADY Screen With Reflex     Standing Status:   Future     Standing Expiration Date:   3/2/2021    CK     Standing Status:   Future     Standing Expiration Date:   3/2/2021    Hepatic Function Panel     Standing Status:   Future     Standing Expiration Date:   3/2/2021     No orders of the defined types were placed in this

## 2020-03-05 ENCOUNTER — TELEPHONE (OUTPATIENT)
Dept: PRIMARY CARE CLINIC | Age: 55
End: 2020-03-05

## 2020-03-12 ENCOUNTER — HOSPITAL ENCOUNTER (OUTPATIENT)
Dept: MRI IMAGING | Age: 55
Discharge: HOME | End: 2020-03-14
Payer: COMMERCIAL

## 2020-03-12 ENCOUNTER — HOSPITAL ENCOUNTER (OUTPATIENT)
Age: 55
Discharge: HOME | End: 2020-03-12
Payer: COMMERCIAL

## 2020-03-12 ENCOUNTER — HOSPITAL ENCOUNTER (OUTPATIENT)
Dept: MAMMOGRAPHY | Age: 55
Discharge: HOME | End: 2020-03-14
Payer: COMMERCIAL

## 2020-03-12 LAB
ABSOLUTE EOS #: 0.15 K/UL (ref 0–0.44)
ABSOLUTE IMMATURE GRANULOCYTE: <0.03 K/UL (ref 0–0.3)
ABSOLUTE LYMPH #: 1.94 K/UL (ref 1.1–3.7)
ABSOLUTE MONO #: 0.41 K/UL (ref 0.1–1.2)
ALBUMIN SERPL-MCNC: 4 G/DL (ref 3.5–5.2)
ALBUMIN/GLOBULIN RATIO: 1.4 (ref 1–2.5)
ALP BLD-CCNC: 115 U/L (ref 35–104)
ALT SERPL-CCNC: 11 U/L (ref 5–33)
AST SERPL-CCNC: 14 U/L
BASOPHILS # BLD: 1 % (ref 0–2)
BASOPHILS ABSOLUTE: 0.05 K/UL (ref 0–0.2)
BILIRUB SERPL-MCNC: <0.1 MG/DL (ref 0.3–1.2)
BILIRUBIN DIRECT: <0.08 MG/DL
BILIRUBIN, INDIRECT: ABNORMAL MG/DL (ref 0–1)
DIFFERENTIAL TYPE: NORMAL
EOSINOPHILS RELATIVE PERCENT: 2 % (ref 1–4)
GLOBULIN: ABNORMAL G/DL (ref 1.5–3.8)
HCT VFR BLD CALC: 44.2 % (ref 36.3–47.1)
HEMOGLOBIN: 14.4 G/DL (ref 11.9–15.1)
IMMATURE GRANULOCYTES: 0 %
LYMPHOCYTES # BLD: 31 % (ref 24–43)
MCH RBC QN AUTO: 28.9 PG (ref 25.2–33.5)
MCHC RBC AUTO-ENTMCNC: 32.6 G/DL (ref 28.4–34.8)
MCV RBC AUTO: 88.8 FL (ref 82.6–102.9)
MONOCYTES # BLD: 7 % (ref 3–12)
NRBC AUTOMATED: 0 PER 100 WBC
PDW BLD-RTO: 13.6 % (ref 11.8–14.4)
PLATELET # BLD: 349 K/UL (ref 138–453)
PLATELET ESTIMATE: NORMAL
PMV BLD AUTO: 10.3 FL (ref 8.1–13.5)
RBC # BLD: 4.98 M/UL (ref 3.95–5.11)
RBC # BLD: NORMAL 10*6/UL
SEG NEUTROPHILS: 59 % (ref 36–65)
SEGMENTED NEUTROPHILS ABSOLUTE COUNT: 3.61 K/UL (ref 1.5–8.1)
THYROXINE, FREE: 1.08 NG/DL (ref 0.93–1.7)
TOTAL CK: 38 U/L (ref 26–192)
TOTAL PROTEIN: 6.8 G/DL (ref 6.4–8.3)
TSH SERPL DL<=0.05 MIU/L-ACNC: 0.68 MIU/L (ref 0.3–5)
WBC # BLD: 6.2 K/UL (ref 3.5–11.3)
WBC # BLD: NORMAL 10*3/UL

## 2020-03-12 PROCEDURE — 36415 COLL VENOUS BLD VENIPUNCTURE: CPT

## 2020-03-12 PROCEDURE — 86038 ANTINUCLEAR ANTIBODIES: CPT

## 2020-03-12 PROCEDURE — 84443 ASSAY THYROID STIM HORMONE: CPT

## 2020-03-12 PROCEDURE — 72148 MRI LUMBAR SPINE W/O DYE: CPT

## 2020-03-12 PROCEDURE — 77063 BREAST TOMOSYNTHESIS BI: CPT

## 2020-03-12 PROCEDURE — 82550 ASSAY OF CK (CPK): CPT

## 2020-03-12 PROCEDURE — 84439 ASSAY OF FREE THYROXINE: CPT

## 2020-03-12 PROCEDURE — 85025 COMPLETE CBC W/AUTO DIFF WBC: CPT

## 2020-03-12 PROCEDURE — 70551 MRI BRAIN STEM W/O DYE: CPT

## 2020-03-12 PROCEDURE — 80076 HEPATIC FUNCTION PANEL: CPT

## 2020-03-13 LAB — ANTI-NUCLEAR ANTIBODY (ANA): NEGATIVE

## 2020-05-01 RX ORDER — CYCLOBENZAPRINE HCL 10 MG
TABLET ORAL
Qty: 60 TABLET | Refills: 2 | Status: SHIPPED | OUTPATIENT
Start: 2020-05-01 | End: 2021-06-21

## 2020-05-04 RX ORDER — ALPRAZOLAM 1 MG/1
TABLET ORAL
Qty: 60 TABLET | Refills: 2 | Status: SHIPPED | OUTPATIENT
Start: 2020-05-04 | End: 2020-06-04

## 2020-05-13 RX ORDER — SUMATRIPTAN 100 MG/1
100 TABLET, FILM COATED ORAL DAILY PRN
Qty: 9 TABLET | Refills: 2 | Status: SHIPPED | OUTPATIENT
Start: 2020-05-13 | End: 2020-09-01 | Stop reason: SDUPTHER

## 2020-06-29 RX ORDER — PHENOBARBITAL 64.8 MG/1
TABLET ORAL
Qty: 60 TABLET | Refills: 2 | Status: SHIPPED | OUTPATIENT
Start: 2020-06-29 | End: 2020-07-29

## 2020-07-15 ENCOUNTER — TELEPHONE (OUTPATIENT)
Dept: PRIMARY CARE CLINIC | Age: 55
End: 2020-07-15

## 2020-07-15 NOTE — TELEPHONE ENCOUNTER
Use saline nose spray 3-4 x a day and mucinex, plain OTC. If not better be seen at [facility].

## 2020-09-01 RX ORDER — SUMATRIPTAN 100 MG/1
100 TABLET, FILM COATED ORAL DAILY PRN
Qty: 9 TABLET | Refills: 2 | Status: SHIPPED | OUTPATIENT
Start: 2020-09-01 | End: 2021-01-15 | Stop reason: SDUPTHER

## 2020-11-11 ENCOUNTER — TELEPHONE (OUTPATIENT)
Dept: PRIMARY CARE CLINIC | Age: 55
End: 2020-11-11

## 2020-11-11 NOTE — TELEPHONE ENCOUNTER
Patient needs a form filled out by end of year to be able to renew her drivers license, due to seizure condition.  (she says she has not had a seizure in 15 years)    She is asking does she need an appt for this or can she just bring the form to the office to be filled out?     Please call patient with direction  Thank you

## 2020-11-20 ENCOUNTER — OFFICE VISIT (OUTPATIENT)
Dept: PRIMARY CARE CLINIC | Age: 55
End: 2020-11-20
Payer: COMMERCIAL

## 2020-11-20 VITALS
DIASTOLIC BLOOD PRESSURE: 80 MMHG | WEIGHT: 133.4 LBS | BODY MASS INDEX: 24.55 KG/M2 | TEMPERATURE: 96.8 F | HEART RATE: 72 BPM | OXYGEN SATURATION: 97 % | HEIGHT: 62 IN | SYSTOLIC BLOOD PRESSURE: 130 MMHG

## 2020-11-20 PROBLEM — I10 ESSENTIAL HYPERTENSION: Status: RESOLVED | Noted: 2019-03-26 | Resolved: 2020-11-20

## 2020-11-20 PROBLEM — G89.29 CHRONIC NECK PAIN: Status: RESOLVED | Noted: 2019-03-26 | Resolved: 2020-11-20

## 2020-11-20 PROBLEM — M54.2 CHRONIC NECK PAIN: Status: RESOLVED | Noted: 2019-03-26 | Resolved: 2020-11-20

## 2020-11-20 PROBLEM — M62.838 MUSCLE SPASM: Status: RESOLVED | Noted: 2017-07-13 | Resolved: 2020-11-20

## 2020-11-20 PROCEDURE — 3017F COLORECTAL CA SCREEN DOC REV: CPT | Performed by: FAMILY MEDICINE

## 2020-11-20 PROCEDURE — G8427 DOCREV CUR MEDS BY ELIG CLIN: HCPCS | Performed by: FAMILY MEDICINE

## 2020-11-20 PROCEDURE — G8484 FLU IMMUNIZE NO ADMIN: HCPCS | Performed by: FAMILY MEDICINE

## 2020-11-20 PROCEDURE — 1036F TOBACCO NON-USER: CPT | Performed by: FAMILY MEDICINE

## 2020-11-20 PROCEDURE — 99213 OFFICE O/P EST LOW 20 MIN: CPT | Performed by: FAMILY MEDICINE

## 2020-11-20 PROCEDURE — G8420 CALC BMI NORM PARAMETERS: HCPCS | Performed by: FAMILY MEDICINE

## 2020-11-20 RX ORDER — ALPRAZOLAM 1 MG/1
TABLET ORAL
COMMUNITY
Start: 2020-09-11 | End: 2020-12-01

## 2020-11-20 RX ORDER — ONDANSETRON 4 MG/1
4 TABLET, FILM COATED ORAL EVERY 8 HOURS PRN
COMMUNITY
Start: 2020-03-05 | End: 2021-07-06 | Stop reason: SDUPTHER

## 2020-11-20 RX ORDER — PHENOBARBITAL 64.8 MG/1
TABLET ORAL
COMMUNITY
Start: 2020-09-11 | End: 2021-01-26 | Stop reason: SDUPTHER

## 2020-11-20 ASSESSMENT — ENCOUNTER SYMPTOMS
EYE DISCHARGE: 0
COUGH: 0
NAUSEA: 0
WHEEZING: 0
ABDOMINAL PAIN: 0
DIARRHEA: 0
SORE THROAT: 0
EYE REDNESS: 0
VOMITING: 0
RHINORRHEA: 0
SHORTNESS OF BREATH: 0

## 2020-11-20 ASSESSMENT — PATIENT HEALTH QUESTIONNAIRE - PHQ9
1. LITTLE INTEREST OR PLEASURE IN DOING THINGS: 0
SUM OF ALL RESPONSES TO PHQ QUESTIONS 1-9: 0
SUM OF ALL RESPONSES TO PHQ QUESTIONS 1-9: 0
SUM OF ALL RESPONSES TO PHQ9 QUESTIONS 1 & 2: 0
SUM OF ALL RESPONSES TO PHQ QUESTIONS 1-9: 0
2. FEELING DOWN, DEPRESSED OR HOPELESS: 0

## 2020-11-20 NOTE — PROGRESS NOTES
[facility]  [address] [address]  [address] [address]  Dept: [address] Saúl Brown is a 53 y.o. female who presents today for her medical conditions/complaintsas noted below. Chief Complaint   Patient presents with    Other     Would like to discuss driving    Flu Vaccine     Declined       HPI:     HPI  Pt states started with seizures 26 years ago. States diagnosed was changed over the years. Diagnosed at [facility]. No seizure for 15 years. On Phenobarbital. By history, had staring spells in childhood.        LDL Cholesterol (mg/dL)   Date Value   2019 162 (H)       (goal LDL is <100)   AST (U/L)   Date Value   2020 14     ALT (U/L)   Date Value   2020 11     BUN (mg/dL)   Date Value   2019 8     BP Readings from Last 3 Encounters:   20 130/80   20 (!) 148/98   19 122/86          (goal 120/80)    Past Medical History:   Diagnosis Date    Migraines     Seizures ([address]       Past Surgical History:   Procedure Laterality Date     SECTION      CHOLECYSTECTOMY, LAPAROSCOPIC      HYSTERECTOMY, TOTAL ABDOMINAL      MANDIBLE FRACTURE SURGERY      SHOULDER SURGERY Left     TOTAL KNEE ARTHROPLASTY Right 2019    TOTAL KNEE ARTHROPLASTY Left 2017       Family History   Problem Relation Age of Onset    Cancer Mother     Heart Disease Mother     Cancer Brother     Cancer Maternal Grandmother        Social History     Tobacco Use    Smoking status: Never Smoker    Smokeless tobacco: Never Used   Substance Use Topics    Alcohol use: Yes     Frequency: Never     Comment: Socially      Current Outpatient Medications   Medication Sig Dispense Refill    ALPRAZolam (XANAX) 1 MG tablet TK 1 T PO BID PRN      ondansetron (ZOFRAN) 4 MG tablet Take 4 mg by mouth every 8 hours as needed      PHENobarbital (LUMINAL) 64.8 MG tablet TK 1 T PO BID      SUMAtriptan (IMITREX) 100 MG tablet Take 1 tablet by mouth daily as needed for Migraine 9 tablet 2    cyclobenzaprine (FLEXERIL) 10 MG tablet TAKE 1 TABLET BY MOUTH THREE TIMES DAILY AS NEEDED 60 tablet 2    meclizine (ANTIVERT) 12.5 MG tablet Take 1 tablet by mouth 3 times daily as needed for Dizziness 45 tablet 2    ibuprofen (ADVIL;MOTRIN) 800 MG tablet TK 1 T PO TID  tablet 2     No current facility-administered medications for this visit. Allergies   Allergen Reactions    Carbamazepine Nausea Only and Nausea And Vomiting    Metoclopramide Other (See Comments)     Seizures      Phenytoin Sodium Extended Other (See Comments)     \"Causes seizure\"    Topiramate Nausea Only and Nausea And Vomiting     unknown    Verapamil Nausea Only and Nausea And Vomiting    Sulfamethoxazole-Trimethoprim Hives    Amoxicillin        Health Maintenance   Topic Date Due    DTaP/Tdap/Td vaccine (1 - Tdap) 12/01/1983    Shingles Vaccine (1 of 2) 12/01/2014    Flu vaccine (1) 06/30/2021 (Originally 9/1/2020)    Potassium monitoring  12/23/2020    Creatinine monitoring  12/23/2020    Breast cancer screen  03/12/2022    Lipid screen  08/14/2024    Colon cancer screen colonoscopy  09/18/2029    Hepatitis C screen  Completed    Hepatitis A vaccine  Aged Out    Hepatitis B vaccine  Aged Out    Hib vaccine  Aged Out    Meningococcal (ACWY) vaccine  Aged Out    Pneumococcal 0-64 years Vaccine  Aged Out    HIV screen  Discontinued       Subjective:      Review of Systems   Constitutional: Negative for chills and fever. HENT: Negative for rhinorrhea and sore throat. Eyes: Negative for discharge and redness. Respiratory: Negative for cough, shortness of breath and wheezing. Cardiovascular: Negative for chest pain and palpitations. Gastrointestinal: Negative for abdominal pain, diarrhea, nausea and vomiting. Genitourinary: Negative for dysuria and frequency. Musculoskeletal: Negative for arthralgias and myalgias. for driving. Patient was offered referral to neurology for second opinion. Patient education Shingrix. Flu shot declined. Return in about 6 months (around 5/20/2021). Orders Placed This Encounter   Procedures    Phenobarbital Level     Standing Status:   Future     Standing Expiration Date:   11/20/2021     Order Specific Question:   Dose Schedule & Time of Last Dose? Answer:   ask patient    Hepatic Function Panel     Standing Status:   Future     Standing Expiration Date:   11/20/2021    CBC With Auto Differential     Standing Status:   Future     Standing Expiration Date:   11/20/2021    Basic Metabolic Panel     Standing Status:   Future     Standing Expiration Date:   11/20/2021    EEG     Standing Status:   Future     Standing Expiration Date:   1/19/2021     No orders of the defined types were placed in this encounter. Patient given educationalmaterials - see patient instructions. Discussed use, benefit, and side effectsof prescribed medications. All patient questions answered. Pt voiced understanding. Reviewed health maintenance. Instructed to continue current medications, diet andexercise. Patient agreed with treatment plan. Follow up as directed.      Electronicallysigned by Kristine Jo MD on 11/20/2020 at 11:01 AM

## 2021-01-15 DIAGNOSIS — G43.009 MIGRAINE WITHOUT AURA AND WITHOUT STATUS MIGRAINOSUS, NOT INTRACTABLE: ICD-10-CM

## 2021-01-15 RX ORDER — SUMATRIPTAN 100 MG/1
100 TABLET, FILM COATED ORAL DAILY PRN
Qty: 9 TABLET | Refills: 5 | Status: SHIPPED | OUTPATIENT
Start: 2021-01-15 | End: 2021-07-12

## 2021-01-26 DIAGNOSIS — G43.909 MIGRAINE WITHOUT STATUS MIGRAINOSUS, NOT INTRACTABLE, UNSPECIFIED MIGRAINE TYPE: Primary | ICD-10-CM

## 2021-01-26 RX ORDER — PHENOBARBITAL 64.8 MG/1
TABLET ORAL
Qty: 60 TABLET | Refills: 2 | Status: SHIPPED | OUTPATIENT
Start: 2021-01-26 | End: 2021-06-21

## 2021-06-01 ENCOUNTER — TELEPHONE (OUTPATIENT)
Dept: PRIMARY CARE CLINIC | Age: 56
End: 2021-06-01

## 2021-06-01 NOTE — TELEPHONE ENCOUNTER
Patient has an appointment with her PCP to be seen, but she wants to know if he recommends for her to receive her second covid vaccine. The patient stated that she had a bad reaction to the first one and would like to review with him if he feels like she should get the second one or not. Please contact the patient as soon as possible to advise.

## 2021-06-03 NOTE — TELEPHONE ENCOUNTER
Would be a good idea to get the second one. Take zyrtec 10 mg before vaccine. If had trouble breathing or difficulty swallowing, should not get the second dose.

## 2021-07-06 ENCOUNTER — OFFICE VISIT (OUTPATIENT)
Dept: PRIMARY CARE CLINIC | Age: 56
End: 2021-07-06
Payer: COMMERCIAL

## 2021-07-06 VITALS
SYSTOLIC BLOOD PRESSURE: 130 MMHG | OXYGEN SATURATION: 96 % | HEIGHT: 62 IN | WEIGHT: 99 LBS | HEART RATE: 78 BPM | BODY MASS INDEX: 18.22 KG/M2 | DIASTOLIC BLOOD PRESSURE: 80 MMHG

## 2021-07-06 DIAGNOSIS — R35.89 POLYURIA: ICD-10-CM

## 2021-07-06 DIAGNOSIS — R63.4 ABNORMAL WEIGHT LOSS: Primary | ICD-10-CM

## 2021-07-06 DIAGNOSIS — R10.12 LUQ PAIN: ICD-10-CM

## 2021-07-06 LAB
BILIRUBIN, POC: NEGATIVE
BLOOD URINE, POC: NEGATIVE
CLARITY, POC: CLEAR
COLOR, POC: YELLOW
GLUCOSE URINE, POC: NEGATIVE
KETONES, POC: ABNORMAL
LEUKOCYTE EST, POC: ABNORMAL
NITRITE, POC: NEGATIVE
PH, POC: 6
PROTEIN, POC: NEGATIVE
SPECIFIC GRAVITY, POC: 1.02
UROBILINOGEN, POC: ABNORMAL

## 2021-07-06 PROCEDURE — 1036F TOBACCO NON-USER: CPT | Performed by: FAMILY MEDICINE

## 2021-07-06 PROCEDURE — 3017F COLORECTAL CA SCREEN DOC REV: CPT | Performed by: FAMILY MEDICINE

## 2021-07-06 PROCEDURE — G8419 CALC BMI OUT NRM PARAM NOF/U: HCPCS | Performed by: FAMILY MEDICINE

## 2021-07-06 PROCEDURE — G8427 DOCREV CUR MEDS BY ELIG CLIN: HCPCS | Performed by: FAMILY MEDICINE

## 2021-07-06 PROCEDURE — 99213 OFFICE O/P EST LOW 20 MIN: CPT | Performed by: FAMILY MEDICINE

## 2021-07-06 PROCEDURE — 81003 URINALYSIS AUTO W/O SCOPE: CPT | Performed by: FAMILY MEDICINE

## 2021-07-06 RX ORDER — ONDANSETRON 4 MG/1
4 TABLET, FILM COATED ORAL EVERY 8 HOURS PRN
Qty: 60 TABLET | Refills: 0 | Status: ON HOLD | OUTPATIENT
Start: 2021-07-06 | End: 2022-02-16 | Stop reason: HOSPADM

## 2021-07-06 SDOH — ECONOMIC STABILITY: FOOD INSECURITY: WITHIN THE PAST 12 MONTHS, THE FOOD YOU BOUGHT JUST DIDN'T LAST AND YOU DIDN'T HAVE MONEY TO GET MORE.: NEVER TRUE

## 2021-07-06 SDOH — ECONOMIC STABILITY: FOOD INSECURITY: WITHIN THE PAST 12 MONTHS, YOU WORRIED THAT YOUR FOOD WOULD RUN OUT BEFORE YOU GOT MONEY TO BUY MORE.: NEVER TRUE

## 2021-07-06 ASSESSMENT — ENCOUNTER SYMPTOMS
COUGH: 0
NAUSEA: 0
EYE DISCHARGE: 0
VOMITING: 0
EYE REDNESS: 0
ABDOMINAL PAIN: 0
DIARRHEA: 0
SHORTNESS OF BREATH: 0
SORE THROAT: 0
BACK PAIN: 1
RHINORRHEA: 0
WHEEZING: 0

## 2021-07-06 ASSESSMENT — PATIENT HEALTH QUESTIONNAIRE - PHQ9
SUM OF ALL RESPONSES TO PHQ QUESTIONS 1-9: 0
1. LITTLE INTEREST OR PLEASURE IN DOING THINGS: 0
SUM OF ALL RESPONSES TO PHQ9 QUESTIONS 1 & 2: 0
SUM OF ALL RESPONSES TO PHQ QUESTIONS 1-9: 0
SUM OF ALL RESPONSES TO PHQ QUESTIONS 1-9: 0
2. FEELING DOWN, DEPRESSED OR HOPELESS: 0

## 2021-07-06 ASSESSMENT — SOCIAL DETERMINANTS OF HEALTH (SDOH): HOW HARD IS IT FOR YOU TO PAY FOR THE VERY BASICS LIKE FOOD, HOUSING, MEDICAL CARE, AND HEATING?: NOT HARD AT ALL

## 2021-07-06 NOTE — PROGRESS NOTES
[facility] PRIMARY CARE  [address]  [address] [address]  Dept: [address] is a 63 y.o. female Established patient, who presents today for her medical conditions/complaints as noted below. Chief Complaint   Patient presents with    6 Month Follow-Up       HPI:     HPI     Patient has lost over 30 lbs since last November. Patient did go through a bereavement process. She has had no appetite. Ever since her COVID diagnosis, she has noted increased symptoms. Patient had COVID-19 back at the end of April, first part of May. Patient was not hospitalized. Patient did receive the first dose of COVID-19 with a bad reaction with vomiting for 3 days, palpitations, SOB and fatigue. She has not received the second dose d/t needing the vaccination in a controlled setting. Extreme diaphoresis and Fatigue and a Lot of Pain in the Back, hands and Bilateral Hips. H/o Migraines and Seizures: Patient only taking Imitrex and Xanax as needed. Patient does not see a Neurologist. On Phenobarbital and would like to continue use. No seizure activity noted. Patient did not get an EEG completed. H/o OA: Hands, back and bilateral hips. Taking flexeril in combination with tylenol with some relief. She states that these symptoms are progressively getting worse. H/o Vertigo: PRN Zofran and Meclizine help. Patient has been out of these medications.                Reviewed prior notes None  Reviewed previous Labs    LDL Cholesterol (mg/dL)   Date Value   08/14/2019 162 (H)       (goal LDL is <100)   AST (U/L)   Date Value   03/12/2020 14     ALT (U/L)   Date Value   03/12/2020 11     BUN (mg/dL)   Date Value   12/23/2019 8     TSH (mIU/L)   Date Value   03/12/2020 0.68     BP Readings from Last 3 Encounters:   07/06/21 130/80   11/20/20 130/80   03/02/20 (!) 148/98          (goal 120/80)    Past Medical History:   Diagnosis Date    Migraines     09/01/2021    Breast cancer screen  03/12/2022    Lipid screen  08/14/2024    Colon cancer screen colonoscopy  09/18/2029    Hepatitis C screen  Completed    Hepatitis A vaccine  Aged Out    Hepatitis B vaccine  Aged Out    Hib vaccine  Aged Out    Meningococcal (ACWY) vaccine  Aged Out    Pneumococcal 0-64 years Vaccine  Aged Out    HIV screen  Discontinued       Subjective:      Review of Systems   Constitutional: Positive for chills, diaphoresis (Intermittent and Extreme ) and fatigue. Negative for fever. HENT: Negative for rhinorrhea and sore throat. Eyes: Negative for discharge and redness. Respiratory: Negative for cough, shortness of breath and wheezing. Cardiovascular: Negative for chest pain and palpitations. Gastrointestinal: Negative for abdominal pain, diarrhea, nausea and vomiting. Genitourinary: Negative for dysuria and frequency. Musculoskeletal: Positive for back pain and myalgias. Negative for arthralgias, joint swelling and neck pain. Skin:        Poor Skin Integrity   Thinning Hair    Neurological: Positive for light-headedness, numbness (Tingling) and headaches (Occasional). Negative for dizziness and seizures. + Vertigo    Psychiatric/Behavioral: Negative for sleep disturbance. Objective:     /80   Pulse 78   Ht 5' 2.04\" (1.576 m)   Wt 99 lb (44.9 kg)   SpO2 96%   BMI 18.08 kg/m²   Physical Exam  Vitals and nursing note reviewed. Constitutional:       General: She is not in acute distress. Appearance: She is well-developed. She is not ill-appearing. HENT:      Head: Normocephalic and atraumatic. Right Ear: External ear normal.      Left Ear: External ear normal.   Eyes:      General: No scleral icterus. Right eye: No discharge. Left eye: No discharge. Conjunctiva/sclera: Conjunctivae normal.      Pupils: Pupils are equal, round, and reactive to light. Neck:      Thyroid: No thyromegaly.       Trachea: No tracheal deviation. Cardiovascular:      Rate and Rhythm: Normal rate and regular rhythm. Heart sounds: Normal heart sounds. Pulmonary:      Effort: Pulmonary effort is normal. No respiratory distress. Breath sounds: Normal breath sounds. No wheezing. Abdominal:      Palpations: There is no mass. Tenderness: There is abdominal tenderness. Comments: Tenderness in left upper quadrant   Lymphadenopathy:      Cervical: No cervical adenopathy. Skin:     General: Skin is warm. Findings: No rash. Neurological:      Mental Status: She is alert and oriented to person, place, and time. Psychiatric:         Mood and Affect: Mood normal.         Behavior: Behavior normal.         Thought Content: Thought content normal.         Assessment:       Diagnosis Orders   1. Abnormal weight loss  CBC With Auto Differential    T4, Free    TSH    Hepatic Function Panel    Lipase    Basic Metabolic Panel, Fasting    CT CHEST W CONTRAST    CT ABDOMEN PELVIS W IV CONTRAST Additional Contrast? Radiologist Recommendation   2. Polyuria  POCT Urinalysis No Micro (Auto)   3. LUQ pain  CT ABDOMEN PELVIS W IV CONTRAST Additional Contrast? Radiologist Recommendation        Plan:    Blood work ordered. If normal is to get CT scan done to rule out cancer  Urinalysis was unremarkable  Patient advised would not get the second Covid vaccine with the reaction that she had. Patient has already had Covid plus had one immunization. She should be protected at this point. Return in about 2 months (around 9/6/2021).     Orders Placed This Encounter   Procedures    CT CHEST W CONTRAST     Standing Status:   Future     Standing Expiration Date:   7/6/2022     Order Specific Question:   Reason for exam:     Answer:   abnormal weight loss    CT ABDOMEN PELVIS W IV CONTRAST Additional Contrast? Radiologist Recommendation     Standing Status:   Future     Standing Expiration Date:   7/6/2022     Order Specific Question: Additional Contrast?     Answer:   Radiologist Recommendation     Order Specific Question:   Reason for exam:     Answer:   r/o cancer, pancreatic mass    CBC With Auto Differential     Standing Status:   Future     Standing Expiration Date:   7/6/2022    T4, Free     Standing Status:   Future     Standing Expiration Date:   7/6/2022    TSH     Standing Status:   Future     Standing Expiration Date:   7/6/2022    Hepatic Function Panel     Standing Status:   Future     Standing Expiration Date:   7/6/2022    Lipase     Standing Status:   Future     Standing Expiration Date:   7/6/2022    Basic Metabolic Panel, Fasting     Standing Status:   Future     Standing Expiration Date:   7/6/2022    POCT Urinalysis No Micro (Auto)     No orders of the defined types were placed in this encounter. Patient given educational materials - see patient instructions. Discussed use, benefit, and side effects of prescribed medications. All patient questions answered. Pt voiced understanding. Reviewed health maintenance. Instructed to continue current medications, diet andexercise. Patient agreed with treatment plan. Follow up as directed.      Electronicallysigned by Brian Cuevas MD on 7/6/2021 at 4:49 PM

## 2021-07-10 DIAGNOSIS — G43.009 MIGRAINE WITHOUT AURA AND WITHOUT STATUS MIGRAINOSUS, NOT INTRACTABLE: ICD-10-CM

## 2021-07-10 DIAGNOSIS — G40.909 SEIZURE DISORDER (HCC): ICD-10-CM

## 2021-07-12 RX ORDER — SUMATRIPTAN 100 MG/1
100 TABLET, FILM COATED ORAL DAILY PRN
Qty: 9 TABLET | Refills: 5 | Status: ON HOLD | OUTPATIENT
Start: 2021-07-12 | End: 2022-02-16 | Stop reason: HOSPADM

## 2021-07-12 RX ORDER — ALPRAZOLAM 1 MG/1
TABLET ORAL
Qty: 60 TABLET | Refills: 0 | Status: SHIPPED | OUTPATIENT
Start: 2021-07-12 | End: 2021-08-23

## 2021-07-13 ENCOUNTER — HOSPITAL ENCOUNTER (OUTPATIENT)
Age: 56
Setting detail: SPECIMEN
Discharge: HOME | End: 2021-07-13
Payer: COMMERCIAL

## 2021-07-13 DIAGNOSIS — R63.4 ABNORMAL WEIGHT LOSS: ICD-10-CM

## 2021-07-13 DIAGNOSIS — M15.9 PRIMARY OSTEOARTHRITIS INVOLVING MULTIPLE JOINTS: ICD-10-CM

## 2021-07-13 LAB
ABSOLUTE EOS #: 0.08 K/UL (ref 0–0.44)
ABSOLUTE IMMATURE GRANULOCYTE: <0.03 K/UL (ref 0–0.3)
ABSOLUTE LYMPH #: 2.17 K/UL (ref 1.1–3.7)
ABSOLUTE MONO #: 0.41 K/UL (ref 0.1–1.2)
ALBUMIN SERPL-MCNC: 4.1 G/DL (ref 3.5–5.2)
ALBUMIN/GLOBULIN RATIO: 1.5 (ref 1–2.5)
ALP BLD-CCNC: 76 U/L (ref 35–104)
ALT SERPL-CCNC: 15 U/L (ref 5–33)
ANION GAP SERPL CALCULATED.3IONS-SCNC: 11 MMOL/L (ref 9–17)
AST SERPL-CCNC: 14 U/L
BASOPHILS # BLD: 1 % (ref 0–2)
BASOPHILS ABSOLUTE: 0.04 K/UL (ref 0–0.2)
BILIRUB SERPL-MCNC: 0.39 MG/DL (ref 0.3–1.2)
BILIRUBIN DIRECT: 0.09 MG/DL
BILIRUBIN, INDIRECT: 0.3 MG/DL (ref 0–1)
BUN BLDV-MCNC: 8 MG/DL (ref 6–20)
BUN/CREAT BLD: ABNORMAL (ref 9–20)
CALCIUM SERPL-MCNC: 9.3 MG/DL (ref 8.6–10.4)
CHLORIDE BLD-SCNC: 102 MMOL/L (ref 98–107)
CO2: 27 MMOL/L (ref 20–31)
CREAT SERPL-MCNC: 0.46 MG/DL (ref 0.5–0.9)
DIFFERENTIAL TYPE: ABNORMAL
EOSINOPHILS RELATIVE PERCENT: 1 % (ref 1–4)
GFR AFRICAN AMERICAN: >60 ML/MIN
GFR NON-AFRICAN AMERICAN: >60 ML/MIN
GFR SERPL CREATININE-BSD FRML MDRD: ABNORMAL ML/MIN/{1.73_M2}
GFR SERPL CREATININE-BSD FRML MDRD: ABNORMAL ML/MIN/{1.73_M2}
GLOBULIN: NORMAL G/DL (ref 1.5–3.8)
GLUCOSE FASTING: 77 MG/DL (ref 70–99)
HCT VFR BLD CALC: 46.4 % (ref 36.3–47.1)
HEMOGLOBIN: 14.9 G/DL (ref 11.9–15.1)
IMMATURE GRANULOCYTES: 0 %
LIPASE: 40 U/L (ref 13–60)
LYMPHOCYTES # BLD: 27 % (ref 24–43)
MCH RBC QN AUTO: 29.3 PG (ref 25.2–33.5)
MCHC RBC AUTO-ENTMCNC: 32.1 G/DL (ref 28.4–34.8)
MCV RBC AUTO: 91.2 FL (ref 82.6–102.9)
MONOCYTES # BLD: 5 % (ref 3–12)
NRBC AUTOMATED: 0 PER 100 WBC
PDW BLD-RTO: 14.5 % (ref 11.8–14.4)
PLATELET # BLD: 341 K/UL (ref 138–453)
PLATELET ESTIMATE: ABNORMAL
PMV BLD AUTO: 9.8 FL (ref 8.1–13.5)
POTASSIUM SERPL-SCNC: 4.6 MMOL/L (ref 3.7–5.3)
RBC # BLD: 5.09 M/UL (ref 3.95–5.11)
RBC # BLD: ABNORMAL 10*6/UL
SEG NEUTROPHILS: 66 % (ref 36–65)
SEGMENTED NEUTROPHILS ABSOLUTE COUNT: 5.24 K/UL (ref 1.5–8.1)
SODIUM BLD-SCNC: 140 MMOL/L (ref 135–144)
THYROXINE, FREE: 1.34 NG/DL (ref 0.93–1.7)
TOTAL PROTEIN: 6.8 G/DL (ref 6.4–8.3)
TSH SERPL DL<=0.05 MIU/L-ACNC: 0.89 MIU/L (ref 0.3–5)
WBC # BLD: 8 K/UL (ref 3.5–11.3)
WBC # BLD: ABNORMAL 10*3/UL

## 2021-07-13 RX ORDER — IBUPROFEN 800 MG/1
TABLET ORAL
Qty: 120 TABLET | Refills: 2 | Status: SHIPPED | OUTPATIENT
Start: 2021-07-13

## 2021-08-23 DIAGNOSIS — G40.909 SEIZURE DISORDER (HCC): ICD-10-CM

## 2021-08-23 DIAGNOSIS — G43.909 MIGRAINE WITHOUT STATUS MIGRAINOSUS, NOT INTRACTABLE, UNSPECIFIED MIGRAINE TYPE: ICD-10-CM

## 2021-08-23 RX ORDER — ALPRAZOLAM 1 MG/1
TABLET ORAL
Qty: 60 TABLET | Refills: 0 | Status: SHIPPED | OUTPATIENT
Start: 2021-08-23 | End: 2021-10-25

## 2021-08-23 RX ORDER — PHENOBARBITAL 64.8 MG/1
TABLET ORAL
Qty: 60 TABLET | Refills: 0 | Status: SHIPPED | OUTPATIENT
Start: 2021-08-23 | End: 2021-10-25

## 2021-10-23 ENCOUNTER — HOSPITAL ENCOUNTER (OUTPATIENT)
Dept: CT IMAGING | Age: 56
Discharge: HOME | End: 2021-10-25
Payer: COMMERCIAL

## 2021-10-23 DIAGNOSIS — R63.4 ABNORMAL WEIGHT LOSS: ICD-10-CM

## 2021-10-23 DIAGNOSIS — R10.12 LUQ PAIN: ICD-10-CM

## 2021-10-23 PROCEDURE — 2580000003 HC RX 258: Performed by: FAMILY MEDICINE

## 2021-10-23 PROCEDURE — 74177 CT ABD & PELVIS W/CONTRAST: CPT

## 2021-10-23 PROCEDURE — 6360000004 HC RX CONTRAST MEDICATION: Performed by: FAMILY MEDICINE

## 2021-10-23 RX ORDER — SODIUM CHLORIDE 0.9 % (FLUSH) 0.9 %
10 SYRINGE (ML) INJECTION PRN
Status: DISCONTINUED | OUTPATIENT
Start: 2021-10-23 | End: 2021-10-26 | Stop reason: HOSPADM

## 2021-10-23 RX ORDER — 0.9 % SODIUM CHLORIDE 0.9 %
80 INTRAVENOUS SOLUTION INTRAVENOUS ONCE
Status: COMPLETED | OUTPATIENT
Start: 2021-10-23 | End: 2021-10-23

## 2021-10-23 RX ADMIN — SODIUM CHLORIDE, PRESERVATIVE FREE 10 ML: 5 INJECTION INTRAVENOUS at 11:50

## 2021-10-23 RX ADMIN — IOPAMIDOL 100 ML: 755 INJECTION, SOLUTION INTRAVENOUS at 11:49

## 2021-10-23 RX ADMIN — IOHEXOL 50 ML: 240 INJECTION, SOLUTION INTRATHECAL; INTRAVASCULAR; INTRAVENOUS; ORAL at 11:50

## 2021-10-23 RX ADMIN — SODIUM CHLORIDE 80 ML: 9 INJECTION, SOLUTION INTRAVENOUS at 11:50

## 2021-10-24 DIAGNOSIS — M54.50 CHRONIC BILATERAL LOW BACK PAIN WITHOUT SCIATICA: ICD-10-CM

## 2021-10-24 DIAGNOSIS — G40.909 SEIZURE DISORDER (HCC): ICD-10-CM

## 2021-10-24 DIAGNOSIS — G43.909 MIGRAINE WITHOUT STATUS MIGRAINOSUS, NOT INTRACTABLE, UNSPECIFIED MIGRAINE TYPE: ICD-10-CM

## 2021-10-24 DIAGNOSIS — G89.29 CHRONIC BILATERAL LOW BACK PAIN WITHOUT SCIATICA: ICD-10-CM

## 2021-10-25 RX ORDER — ALPRAZOLAM 1 MG/1
TABLET ORAL
Qty: 60 TABLET | Refills: 1 | Status: SHIPPED | OUTPATIENT
Start: 2021-10-25 | End: 2022-01-31

## 2021-10-25 RX ORDER — CYCLOBENZAPRINE HCL 10 MG
TABLET ORAL
Qty: 60 TABLET | Refills: 0 | Status: ON HOLD | OUTPATIENT
Start: 2021-10-25 | End: 2022-02-16 | Stop reason: HOSPADM

## 2021-10-25 RX ORDER — PHENOBARBITAL 64.8 MG/1
TABLET ORAL
Qty: 60 TABLET | Refills: 1 | Status: SHIPPED | OUTPATIENT
Start: 2021-10-25 | End: 2021-11-17 | Stop reason: SDUPTHER

## 2021-10-26 ENCOUNTER — TELEPHONE (OUTPATIENT)
Dept: PRIMARY CARE CLINIC | Age: 56
End: 2021-10-26

## 2021-10-26 NOTE — TELEPHONE ENCOUNTER
[facility] precert calling, CT chest and CT abd/pelvis both denied by pt's insurance d/t no previous imaging. Peer to peer [phone] option 4, case # [id number] and [id number]. Imaging has already been done.      Yadi [phone] [facility]

## 2021-10-28 ENCOUNTER — TELEPHONE (OUTPATIENT)
Dept: PRIMARY CARE CLINIC | Age: 56
End: 2021-10-28

## 2021-10-28 NOTE — TELEPHONE ENCOUNTER
----- Message from López Willoughby sent at 10/27/2021  8:56 AM EDT -----  Subject: Message to Provider    QUESTIONS  Information for Provider? Pt called in and want to know test results that   was done on Saturday and want to know if pt can push appointment back if   pt can just get results or should pt keep appointment .   ---------------------------------------------------------------------------  --------------  [address]  What is the best way for the office to contact you? OK to leave message on   voicemail  Preferred Call Back Phone Number? [phone]  ---------------------------------------------------------------------------  --------------  SCRIPT ANSWERS  Relationship to Patient?  Self

## 2021-10-28 NOTE — TELEPHONE ENCOUNTER
Advise patient multiple findings. Shows possible aspiration or pneumonia. Patient also shows some cyst on the kidneys. Also shows  lung nodule.   Can discuss specific options at follow-up appointment

## 2021-10-28 NOTE — TELEPHONE ENCOUNTER
Patient is calling about her CT abdomen results (scanned under imaging)     Pt asking if she can get her results over the phone? Pt states she might have to work the day of her appt and wanted to push it back but she is unsure if the results are urgent or not. Please advise.

## 2021-11-03 NOTE — TELEPHONE ENCOUNTER
Patient presents to window with paperwork from her insurance stating a peer to peer is necessary. They are saying imaging was \"not medically necessary\".   Peer to peer needs to be done within 14 days

## 2021-11-05 ENCOUNTER — TELEPHONE (OUTPATIENT)
Dept: PRIMARY CARE CLINIC | Age: 56
End: 2021-11-05

## 2021-11-05 NOTE — TELEPHONE ENCOUNTER
Patient called stating that the CT she had done on 10/23/21 was denied by insurance. There is a denial letter scanned in on 10/26/21. Not sure if a precert was ever done. Bryce Wetzel said to send this to you, that you have a contact.

## 2021-11-05 NOTE — TELEPHONE ENCOUNTER
Emailed   [email or web address]  This is his response    I had a chance to look into this one just now and Im seeing a note where our auth agent allowed the test to proceed thinking auth would be approved at a later date based on the clinical documentation submitted on this patient (see screenshot below from notes on the patients referral). Please refer to [facility] Pending Auth/Cancelation policy which I have attached. The policy outlines how [facility] would like us to proceed in these scenarios. Since our prior auth staff allowed patient to proceed, patient will not be held liable for the facility charge for the exam if the test ends up being denied post appeals process. What patient received is likely an EOB, not a bill, but its common for patient to interpret it as a bill. Seems from the notes, a peer to peer is needed and at this point its up to the Provider on whether or not they want to complete that. Patient informed to contact us if she should receive a bill for this service.

## 2021-11-17 ENCOUNTER — OFFICE VISIT (OUTPATIENT)
Dept: PRIMARY CARE CLINIC | Age: 56
End: 2021-11-17
Payer: COMMERCIAL

## 2021-11-17 VITALS
SYSTOLIC BLOOD PRESSURE: 138 MMHG | BODY MASS INDEX: 18.11 KG/M2 | DIASTOLIC BLOOD PRESSURE: 88 MMHG | OXYGEN SATURATION: 90 % | WEIGHT: 98.4 LBS | HEIGHT: 62 IN | HEART RATE: 75 BPM

## 2021-11-17 DIAGNOSIS — Z12.31 ENCOUNTER FOR SCREENING MAMMOGRAM FOR MALIGNANT NEOPLASM OF BREAST: ICD-10-CM

## 2021-11-17 DIAGNOSIS — N28.1 RENAL CYST: Primary | ICD-10-CM

## 2021-11-17 DIAGNOSIS — G43.909 MIGRAINE WITHOUT STATUS MIGRAINOSUS, NOT INTRACTABLE, UNSPECIFIED MIGRAINE TYPE: ICD-10-CM

## 2021-11-17 DIAGNOSIS — R63.4 ABNORMAL WEIGHT LOSS: ICD-10-CM

## 2021-11-17 DIAGNOSIS — G40.909 SEIZURE DISORDER (HCC): ICD-10-CM

## 2021-11-17 PROCEDURE — 99214 OFFICE O/P EST MOD 30 MIN: CPT | Performed by: FAMILY MEDICINE

## 2021-11-17 PROCEDURE — 3017F COLORECTAL CA SCREEN DOC REV: CPT | Performed by: FAMILY MEDICINE

## 2021-11-17 PROCEDURE — G8427 DOCREV CUR MEDS BY ELIG CLIN: HCPCS | Performed by: FAMILY MEDICINE

## 2021-11-17 PROCEDURE — G8484 FLU IMMUNIZE NO ADMIN: HCPCS | Performed by: FAMILY MEDICINE

## 2021-11-17 PROCEDURE — G8419 CALC BMI OUT NRM PARAM NOF/U: HCPCS | Performed by: FAMILY MEDICINE

## 2021-11-17 PROCEDURE — 1036F TOBACCO NON-USER: CPT | Performed by: FAMILY MEDICINE

## 2021-11-17 RX ORDER — PHENOBARBITAL 64.8 MG/1
64.8 TABLET ORAL 2 TIMES DAILY
Qty: 60 TABLET | Refills: 3 | Status: SHIPPED | OUTPATIENT
Start: 2021-11-17 | End: 2022-09-01 | Stop reason: SDUPTHER

## 2021-11-17 ASSESSMENT — ENCOUNTER SYMPTOMS
EYE DISCHARGE: 0
WHEEZING: 0
NAUSEA: 0
COUGH: 0
DIARRHEA: 0
VOMITING: 0
EYE REDNESS: 0
RHINORRHEA: 0
SHORTNESS OF BREATH: 0
ABDOMINAL PAIN: 0
SORE THROAT: 0

## 2021-11-17 NOTE — PROGRESS NOTES
Family History   Problem Relation Age of Onset    Cancer Mother     Heart Disease Mother     Cancer Brother     Cancer Maternal Grandmother        Social History     Tobacco Use    Smoking status: Never Smoker    Smokeless tobacco: Never Used   Substance Use Topics    Alcohol use: Yes     Comment: Socially      Current Outpatient Medications   Medication Sig Dispense Refill    PHENobarbital (LUMINAL) 64.8 MG tablet Take 1 tablet by mouth 2 times daily for 30 days. 60 tablet 3    cyclobenzaprine (FLEXERIL) 10 MG tablet TAKE 1 TABLET BY MOUTH THREE TIMES DAILY AS NEEDED 60 tablet 0    ALPRAZolam (XANAX) 1 MG tablet TAKE 1 TABLET BY MOUTH TWICE DAILY AS NEEDED 60 tablet 1    ibuprofen (ADVIL;MOTRIN) 800 MG tablet TK 1 T PO TID  tablet 2    SUMAtriptan (IMITREX) 100 MG tablet TAKE 1 TABLET BY MOUTH DAILY AS NEEDED FOR MIGRAINE 9 tablet 5    ondansetron (ZOFRAN) 4 MG tablet Take 1 tablet by mouth every 8 hours as needed for Nausea 60 tablet 0    meclizine (ANTIVERT) 12.5 MG tablet Take 1 tablet by mouth 3 times daily as needed for Dizziness 45 tablet 2     No current facility-administered medications for this visit.      Allergies   Allergen Reactions    Carbamazepine Nausea Only and Nausea And Vomiting    Metoclopramide Other (See Comments)     Seizures      Phenytoin Sodium Extended Other (See Comments)     \"Causes seizure\"    Topiramate Nausea Only and Nausea And Vomiting     unknown    Verapamil Nausea Only and Nausea And Vomiting    Sulfamethoxazole-Trimethoprim Hives    Amoxicillin        Health Maintenance   Topic Date Due    DTaP/Tdap/Td vaccine (1 - Tdap) Never done    Shingles Vaccine (1 of 2) Never done    COVID-19 Vaccine (2 - Pfizer 2-dose series) 06/19/2021    Flu vaccine (1) 09/01/2021    Breast cancer screen  03/12/2022    Lipid screen  08/14/2024    Colon cancer screen colonoscopy  09/18/2029    Hepatitis C screen  Completed    Hepatitis A vaccine  Aged Horace Lawson Hepatitis B vaccine  Aged Out    Hib vaccine  Aged Out    Meningococcal (ACWY) vaccine  Aged Out    Pneumococcal 0-64 years Vaccine  Aged Out    HIV screen  Discontinued       Subjective:      Review of Systems   Constitutional: Negative for chills and fever. HENT: Negative for rhinorrhea and sore throat. Eyes: Negative for discharge and redness. Respiratory: Negative for cough, shortness of breath and wheezing. Cardiovascular: Negative for chest pain and palpitations. Gastrointestinal: Negative for abdominal pain, diarrhea, nausea and vomiting. Genitourinary: Negative for dysuria and frequency. Musculoskeletal: Negative for arthralgias and myalgias. Neurological: Negative for dizziness, light-headedness and headaches. Psychiatric/Behavioral: Negative for sleep disturbance. Objective:     /88   Pulse 75   Ht 5' 2.04\" (1.576 m)   Wt 98 lb 6.4 oz (44.6 kg)   SpO2 90%   BMI 17.97 kg/m²   Physical Exam  Vitals and nursing note reviewed. Constitutional:       General: She is not in acute distress. Appearance: She is well-developed. She is not ill-appearing. HENT:      Head: Normocephalic and atraumatic. Right Ear: External ear normal.      Left Ear: External ear normal.   Eyes:      General: No scleral icterus. Right eye: No discharge. Left eye: No discharge. Conjunctiva/sclera: Conjunctivae normal.      Pupils: Pupils are equal, round, and reactive to light. Neck:      Thyroid: No thyromegaly. Trachea: No tracheal deviation. Cardiovascular:      Rate and Rhythm: Normal rate and regular rhythm. Heart sounds: Normal heart sounds. Pulmonary:      Effort: Pulmonary effort is normal. No respiratory distress. Breath sounds: Normal breath sounds. No wheezing. Lymphadenopathy:      Cervical: No cervical adenopathy. Skin:     General: Skin is warm. Findings: No rash.    Neurological:      Mental Status: She is alert and oriented to person, place, and time. Psychiatric:         Mood and Affect: Mood normal.         Behavior: Behavior normal.         Thought Content: Thought content normal.         Assessment:       Diagnosis Orders   1. Renal cyst  MRI ABDOMEN W CONTRAST   2. Encounter for screening mammogram for malignant neoplasm of breast  DORENE DIGITAL SCREEN W OR WO CAD BILATERAL   3. Seizure disorder ([facility] [address]     4. Abnormal weight loss     5. Migraine without status migrainosus, not intractable, unspecified migraine type  PHENobarbital (LUMINAL) 64.8 MG tablet        Plan:    Note for work restriction due to seizures. Patient okayed to return to work. MRI by renal protocol for cyst seen on kidney. Mammogram ordered  Use of supplement discussed  We will follow weight closely    Return in about 3 months (around 2/17/2022). Orders Placed This Encounter   Procedures    MRI ABDOMEN W CONTRAST     Standing Status:   Future     Standing Expiration Date:   11/17/2022     Scheduling Instructions:      Renal protocol     Order Specific Question:   Reason for exam:     Answer:   renal cyst     Order Specific Question:   Specify organ? Answer:   Kidneys    DORENE DIGITAL SCREEN W OR WO CAD BILATERAL     Standing Status:   Future     Standing Expiration Date:   1/17/2023     Order Specific Question:   Reason for exam:     Answer:   screen     Orders Placed This Encounter   Medications    PHENobarbital (LUMINAL) 64.8 MG tablet     Sig: Take 1 tablet by mouth 2 times daily for 30 days. Dispense:  60 tablet     Refill:  3       Patient given educational materials - see patient instructions. Discussed use, benefit, and side effects of prescribed medications. All patient questions answered. Pt voiced understanding. Reviewed health maintenance. Instructed to continue current medications, diet andexercise. Patient agreed with treatment plan. Follow up as directed.      Electronicallysigned by Bharati Wood MD on 11/17/2021 at 4:31 PM

## 2021-12-23 ENCOUNTER — TELEPHONE (OUTPATIENT)
Dept: PRIMARY CARE CLINIC | Age: 56
End: 2021-12-23

## 2022-02-13 ENCOUNTER — APPOINTMENT (OUTPATIENT)
Dept: GENERAL RADIOLOGY | Age: 57
DRG: 522 | End: 2022-02-13
Payer: COMMERCIAL

## 2022-02-13 ENCOUNTER — APPOINTMENT (OUTPATIENT)
Dept: CT IMAGING | Age: 57
DRG: 522 | End: 2022-02-13
Payer: COMMERCIAL

## 2022-02-13 ENCOUNTER — HOSPITAL ENCOUNTER (INPATIENT)
Age: 57
LOS: 4 days | Discharge: NURSING, REHAB OR LONG-TERM CARE | DRG: 522 | End: 2022-02-18
Attending: EMERGENCY MEDICINE | Admitting: SURGERY
Payer: COMMERCIAL
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
## 2022-02-13 DIAGNOSIS — S72.001A CLOSED FRACTURE OF NECK OF RIGHT FEMUR, INITIAL ENCOUNTER (HCC): Primary | ICD-10-CM

## 2022-02-13 DIAGNOSIS — G40.909 SEIZURE DISORDER (HCC): ICD-10-CM

## 2022-02-13 LAB
ABSOLUTE EOS #: 0.07 K/UL (ref 0–0.44)
ABSOLUTE IMMATURE GRANULOCYTE: 0.04 K/UL (ref 0–0.3)
ABSOLUTE LYMPH #: 2.11 K/UL (ref 1.1–3.7)
ABSOLUTE MONO #: 0.56 K/UL (ref 0.1–1.2)
ACETAMINOPHEN LEVEL: <5 UG/ML (ref 10–30)
ANION GAP SERPL CALCULATED.3IONS-SCNC: 11 MMOL/L (ref 9–17)
BASOPHILS # BLD: 1 % (ref 0–2)
BASOPHILS ABSOLUTE: 0.03 K/UL (ref 0–0.2)
BUN BLDV-MCNC: 8 MG/DL (ref 6–20)
BUN/CREAT BLD: ABNORMAL (ref 9–20)
CALCIUM SERPL-MCNC: 9.5 MG/DL (ref 8.6–10.4)
CHLORIDE BLD-SCNC: 104 MMOL/L (ref 98–107)
CO2: 24 MMOL/L (ref 20–31)
CREAT SERPL-MCNC: 0.44 MG/DL (ref 0.5–0.9)
DIFFERENTIAL TYPE: ABNORMAL
EOSINOPHILS RELATIVE PERCENT: 1 % (ref 1–4)
ETHANOL PERCENT: <0.01 %
ETHANOL: <10 MG/DL
GFR AFRICAN AMERICAN: >60 ML/MIN
GFR NON-AFRICAN AMERICAN: >60 ML/MIN
GFR SERPL CREATININE-BSD FRML MDRD: ABNORMAL ML/MIN/{1.73_M2}
GFR SERPL CREATININE-BSD FRML MDRD: ABNORMAL ML/MIN/{1.73_M2}
GLUCOSE BLD-MCNC: 101 MG/DL (ref 70–99)
HCT VFR BLD CALC: 44.8 % (ref 36.3–47.1)
HEMOGLOBIN: 14.8 G/DL (ref 11.9–15.1)
IMMATURE GRANULOCYTES: 1 %
LYMPHOCYTES # BLD: 34 % (ref 24–43)
MCH RBC QN AUTO: 29.2 PG (ref 25.2–33.5)
MCHC RBC AUTO-ENTMCNC: 33 G/DL (ref 28.4–34.8)
MCV RBC AUTO: 88.5 FL (ref 82.6–102.9)
MONOCYTES # BLD: 9 % (ref 3–12)
NRBC AUTOMATED: 0 PER 100 WBC
PDW BLD-RTO: 13.4 % (ref 11.8–14.4)
PLATELET # BLD: 319 K/UL (ref 138–453)
PLATELET ESTIMATE: ABNORMAL
PMV BLD AUTO: 9.8 FL (ref 8.1–13.5)
POTASSIUM SERPL-SCNC: 3.9 MMOL/L (ref 3.7–5.3)
RBC # BLD: 5.06 M/UL (ref 3.95–5.11)
RBC # BLD: ABNORMAL 10*6/UL
SALICYLATE LEVEL: <1 MG/DL (ref 3–10)
SARS-COV-2, RAPID: NOT DETECTED
SEG NEUTROPHILS: 54 % (ref 36–65)
SEGMENTED NEUTROPHILS ABSOLUTE COUNT: 3.44 K/UL (ref 1.5–8.1)
SODIUM BLD-SCNC: 139 MMOL/L (ref 135–144)
SPECIMEN DESCRIPTION: NORMAL
TOXIC TRICYCLIC SC,BLOOD: NEGATIVE
WBC # BLD: 6.3 K/UL (ref 3.5–11.3)
WBC # BLD: ABNORMAL 10*3/UL

## 2022-02-13 PROCEDURE — 6360000002 HC RX W HCPCS: Performed by: PEDIATRICS

## 2022-02-13 PROCEDURE — 80179 DRUG ASSAY SALICYLATE: CPT

## 2022-02-13 PROCEDURE — 6360000004 HC RX CONTRAST MEDICATION: Performed by: STUDENT IN AN ORGANIZED HEALTH CARE EDUCATION/TRAINING PROGRAM

## 2022-02-13 PROCEDURE — 99285 EMERGENCY DEPT VISIT HI MDM: CPT

## 2022-02-13 PROCEDURE — 87635 SARS-COV-2 COVID-19 AMP PRB: CPT

## 2022-02-13 PROCEDURE — 80307 DRUG TEST PRSMV CHEM ANLYZR: CPT

## 2022-02-13 PROCEDURE — 96375 TX/PRO/DX INJ NEW DRUG ADDON: CPT

## 2022-02-13 PROCEDURE — 82306 VITAMIN D 25 HYDROXY: CPT

## 2022-02-13 PROCEDURE — 73552 X-RAY EXAM OF FEMUR 2/>: CPT

## 2022-02-13 PROCEDURE — 80143 DRUG ASSAY ACETAMINOPHEN: CPT

## 2022-02-13 PROCEDURE — 85025 COMPLETE CBC W/AUTO DIFF WBC: CPT

## 2022-02-13 PROCEDURE — 96376 TX/PRO/DX INJ SAME DRUG ADON: CPT

## 2022-02-13 PROCEDURE — 80048 BASIC METABOLIC PNL TOTAL CA: CPT

## 2022-02-13 PROCEDURE — 73502 X-RAY EXAM HIP UNI 2-3 VIEWS: CPT

## 2022-02-13 PROCEDURE — G0480 DRUG TEST DEF 1-7 CLASSES: HCPCS

## 2022-02-13 PROCEDURE — 71045 X-RAY EXAM CHEST 1 VIEW: CPT

## 2022-02-13 PROCEDURE — 96374 THER/PROPH/DIAG INJ IV PUSH: CPT

## 2022-02-13 PROCEDURE — 71260 CT THORAX DX C+: CPT

## 2022-02-13 RX ORDER — FENTANYL CITRATE 50 UG/ML
50 INJECTION, SOLUTION INTRAMUSCULAR; INTRAVENOUS ONCE
Status: COMPLETED | OUTPATIENT
Start: 2022-02-13 | End: 2022-02-13

## 2022-02-13 RX ORDER — FENTANYL CITRATE 50 UG/ML
100 INJECTION, SOLUTION INTRAMUSCULAR; INTRAVENOUS
Status: DISCONTINUED | OUTPATIENT
Start: 2022-02-13 | End: 2022-02-14

## 2022-02-13 RX ORDER — NALOXONE HYDROCHLORIDE 0.4 MG/ML
0.4 INJECTION, SOLUTION INTRAMUSCULAR; INTRAVENOUS; SUBCUTANEOUS PRN
Status: DISCONTINUED | OUTPATIENT
Start: 2022-02-13 | End: 2022-02-14

## 2022-02-13 RX ORDER — FENTANYL CITRATE 50 UG/ML
50 INJECTION, SOLUTION INTRAMUSCULAR; INTRAVENOUS
Status: DISCONTINUED | OUTPATIENT
Start: 2022-02-13 | End: 2022-02-13

## 2022-02-13 RX ORDER — ORPHENADRINE CITRATE 30 MG/ML
60 INJECTION INTRAMUSCULAR; INTRAVENOUS ONCE
Status: COMPLETED | OUTPATIENT
Start: 2022-02-13 | End: 2022-02-13

## 2022-02-13 RX ADMIN — FENTANYL CITRATE 50 MCG: 50 INJECTION, SOLUTION INTRAMUSCULAR; INTRAVENOUS at 23:01

## 2022-02-13 RX ADMIN — IOPAMIDOL 75 ML: 755 INJECTION, SOLUTION INTRAVENOUS at 20:41

## 2022-02-13 RX ADMIN — FENTANYL CITRATE 50 MCG: 50 INJECTION, SOLUTION INTRAMUSCULAR; INTRAVENOUS at 18:53

## 2022-02-13 RX ADMIN — FENTANYL CITRATE 50 MCG: 50 INJECTION, SOLUTION INTRAMUSCULAR; INTRAVENOUS at 20:21

## 2022-02-13 RX ADMIN — ORPHENADRINE CITRATE 60 MG: 60 INJECTION INTRAMUSCULAR; INTRAVENOUS at 20:22

## 2022-02-13 ASSESSMENT — ENCOUNTER SYMPTOMS
COUGH: 0
NAUSEA: 0
SHORTNESS OF BREATH: 0
SORE THROAT: 0
RHINORRHEA: 0
ABDOMINAL PAIN: 1
WHEEZING: 0
VOMITING: 0
DIARRHEA: 0
EYE REDNESS: 0
CONSTIPATION: 0
EYE DISCHARGE: 0
CHOKING: 0

## 2022-02-13 ASSESSMENT — PAIN SCALES - GENERAL
PAINLEVEL_OUTOF10: 10
PAINLEVEL_OUTOF10: 10
PAINLEVEL_OUTOF10: 8
PAINLEVEL_OUTOF10: 10

## 2022-02-13 NOTE — ED TRIAGE NOTES
Pt presented to ED via [address] [place] [address] EMS, pt stated she was putting away Alex decorations and had a fall at home tripping over pant leg on to hard wood floor, states she laied there for 20 min because she could not move her leg to get to her phone. EMS reported no abnormalcies or deformities.  Pt rates pain in R hip and shoulder 8 on 0-10 pain scale  Ortho HX with bi lat knee replacements patient of [facility]. 12 Lead EKG performed prior to arrival EMS stated normal, vitals obtained and resident and physician in to assess

## 2022-02-13 NOTE — ED NOTES
Bed: 36  Expected date:   Expected time:   Means of arrival:   Comments:  Rom Corea RN  02/13/22 [id number]

## 2022-02-13 NOTE — ED PROVIDER NOTES
[facility]     Emergency Department     Faculty Attestation    I performed a history and physical examination of the patient and discussed management with the resident. I reviewed the residents note and agree with the documented findings including all diagnostic interpretations and plan of care. Any areas of disagreement are noted on the chart. I was personally present for the key portions of any procedures. I have documented in the chart those procedures where I was not present during the key portions. I have reviewed the emergency nurses triage note. I agree with the chief complaint, past medical history, past surgical history, allergies, medications, social and family history as documented unless otherwise noted below. Documentation of the HPI, Physical Exam and Medical Decision Making performed by scribes is based on my personal performance of the HPI, PE and MDM. For Physician Assistant/ Nurse Practitioner cases/documentation I have personally evaluated this patient and have completed at least one if not all key elements of the E/M (history, physical exam, and MDM). Additional findings are as noted. This patient was evaluated in the Emergency Department for symptoms described in the history of present illness. He/she was evaluated in the context of the global COVID-19 pandemic, which necessitated consideration that the patient might be at risk for infection with the SARS-CoV-2 virus that causes COVID-19. Institutional protocols and algorithms that pertain to the evaluation of patients at risk for COVID-19 are in a state of rapid change based on information released by regulatory bodies including the CDC and federal and state organizations. These policies and algorithms were followed during the patient's care in the ED. Primary Care Physician: Henry Joya MD    History:  This is a 61 y.o. female who presents to the Emergency Department with

## 2022-02-14 ENCOUNTER — ANESTHESIA (OUTPATIENT)
Dept: OPERATING ROOM | Age: 57
DRG: 522 | End: 2022-02-14
Payer: COMMERCIAL

## 2022-02-14 ENCOUNTER — ANESTHESIA EVENT (OUTPATIENT)
Dept: OPERATING ROOM | Age: 57
DRG: 522 | End: 2022-02-14
Payer: COMMERCIAL

## 2022-02-14 ENCOUNTER — APPOINTMENT (OUTPATIENT)
Dept: GENERAL RADIOLOGY | Age: 57
DRG: 522 | End: 2022-02-14
Payer: COMMERCIAL

## 2022-02-14 VITALS
RESPIRATION RATE: 11 BRPM | SYSTOLIC BLOOD PRESSURE: 97 MMHG | DIASTOLIC BLOOD PRESSURE: 85 MMHG | OXYGEN SATURATION: 100 % | TEMPERATURE: 95.5 F

## 2022-02-14 PROBLEM — S72.001A CLOSED FRACTURE OF NECK OF RIGHT FEMUR (HCC): Status: ACTIVE | Noted: 2022-02-14

## 2022-02-14 LAB
ABSOLUTE EOS #: 0.04 K/UL (ref 0–0.44)
ABSOLUTE IMMATURE GRANULOCYTE: <0.03 K/UL (ref 0–0.3)
ABSOLUTE LYMPH #: 1.58 K/UL (ref 1.1–3.7)
ABSOLUTE MONO #: 0.49 K/UL (ref 0.1–1.2)
ANION GAP SERPL CALCULATED.3IONS-SCNC: 7 MMOL/L (ref 9–17)
BASOPHILS # BLD: 0 % (ref 0–2)
BASOPHILS ABSOLUTE: <0.03 K/UL (ref 0–0.2)
BUN BLDV-MCNC: 6 MG/DL (ref 6–20)
BUN/CREAT BLD: ABNORMAL (ref 9–20)
CALCIUM SERPL-MCNC: 8.9 MG/DL (ref 8.6–10.4)
CHLORIDE BLD-SCNC: 106 MMOL/L (ref 98–107)
CO2: 25 MMOL/L (ref 20–31)
CREAT SERPL-MCNC: 0.35 MG/DL (ref 0.5–0.9)
DIFFERENTIAL TYPE: ABNORMAL
EOSINOPHILS RELATIVE PERCENT: 1 % (ref 1–4)
GFR AFRICAN AMERICAN: >60 ML/MIN
GFR NON-AFRICAN AMERICAN: >60 ML/MIN
GFR SERPL CREATININE-BSD FRML MDRD: ABNORMAL ML/MIN/{1.73_M2}
GFR SERPL CREATININE-BSD FRML MDRD: ABNORMAL ML/MIN/{1.73_M2}
GLUCOSE BLD-MCNC: 105 MG/DL (ref 70–99)
HCT VFR BLD CALC: 40.6 % (ref 36.3–47.1)
HEMOGLOBIN: 13.1 G/DL (ref 11.9–15.1)
IMMATURE GRANULOCYTES: 0 %
INR BLD: 1
LYMPHOCYTES # BLD: 23 % (ref 24–43)
MCH RBC QN AUTO: 28.6 PG (ref 25.2–33.5)
MCHC RBC AUTO-ENTMCNC: 32.3 G/DL (ref 28.4–34.8)
MCV RBC AUTO: 88.6 FL (ref 82.6–102.9)
MONOCYTES # BLD: 7 % (ref 3–12)
NRBC AUTOMATED: 0 PER 100 WBC
PARTIAL THROMBOPLASTIN TIME: 23.7 SEC (ref 20.5–30.5)
PDW BLD-RTO: 13.5 % (ref 11.8–14.4)
PLATELET # BLD: 262 K/UL (ref 138–453)
PLATELET ESTIMATE: ABNORMAL
PMV BLD AUTO: 9.6 FL (ref 8.1–13.5)
POTASSIUM SERPL-SCNC: 3.9 MMOL/L (ref 3.7–5.3)
PROTHROMBIN TIME: 10.6 SEC (ref 9.1–12.3)
RBC # BLD: 4.58 M/UL (ref 3.95–5.11)
RBC # BLD: ABNORMAL 10*6/UL
SEG NEUTROPHILS: 69 % (ref 36–65)
SEGMENTED NEUTROPHILS ABSOLUTE COUNT: 4.75 K/UL (ref 1.5–8.1)
SODIUM BLD-SCNC: 138 MMOL/L (ref 135–144)
VITAMIN D 25-HYDROXY: 47.7 NG/ML (ref 30–100)
WBC # BLD: 6.9 K/UL (ref 3.5–11.3)
WBC # BLD: ABNORMAL 10*3/UL

## 2022-02-14 PROCEDURE — 6360000002 HC RX W HCPCS: Performed by: STUDENT IN AN ORGANIZED HEALTH CARE EDUCATION/TRAINING PROGRAM

## 2022-02-14 PROCEDURE — 2720000010 HC SURG SUPPLY STERILE: Performed by: ORTHOPAEDIC SURGERY

## 2022-02-14 PROCEDURE — 85730 THROMBOPLASTIN TIME PARTIAL: CPT

## 2022-02-14 PROCEDURE — 0SR901A REPLACEMENT OF RIGHT HIP JOINT WITH METAL SYNTHETIC SUBSTITUTE, UNCEMENTED, OPEN APPROACH: ICD-10-PCS | Performed by: ORTHOPAEDIC SURGERY

## 2022-02-14 PROCEDURE — 3600000004 HC SURGERY LEVEL 4 BASE: Performed by: ORTHOPAEDIC SURGERY

## 2022-02-14 PROCEDURE — 6360000002 HC RX W HCPCS: Performed by: PEDIATRICS

## 2022-02-14 PROCEDURE — 3600000014 HC SURGERY LEVEL 4 ADDTL 15MIN: Performed by: ORTHOPAEDIC SURGERY

## 2022-02-14 PROCEDURE — 6360000002 HC RX W HCPCS: Performed by: ORTHOPAEDIC SURGERY

## 2022-02-14 PROCEDURE — 85025 COMPLETE CBC W/AUTO DIFF WBC: CPT

## 2022-02-14 PROCEDURE — 27130 TOTAL HIP ARTHROPLASTY: CPT | Performed by: ORTHOPAEDIC SURGERY

## 2022-02-14 PROCEDURE — 2580000003 HC RX 258: Performed by: ORTHOPAEDIC SURGERY

## 2022-02-14 PROCEDURE — 3700000001 HC ADD 15 MINUTES (ANESTHESIA): Performed by: ORTHOPAEDIC SURGERY

## 2022-02-14 PROCEDURE — 6370000000 HC RX 637 (ALT 250 FOR IP): Performed by: STUDENT IN AN ORGANIZED HEALTH CARE EDUCATION/TRAINING PROGRAM

## 2022-02-14 PROCEDURE — 85610 PROTHROMBIN TIME: CPT

## 2022-02-14 PROCEDURE — 76942 ECHO GUIDE FOR BIOPSY: CPT | Performed by: ANESTHESIOLOGY

## 2022-02-14 PROCEDURE — 71045 X-RAY EXAM CHEST 1 VIEW: CPT

## 2022-02-14 PROCEDURE — 2500000003 HC RX 250 WO HCPCS: Performed by: ANESTHESIOLOGY

## 2022-02-14 PROCEDURE — 2580000003 HC RX 258: Performed by: STUDENT IN AN ORGANIZED HEALTH CARE EDUCATION/TRAINING PROGRAM

## 2022-02-14 PROCEDURE — 3209999900 FLUORO FOR SURGICAL PROCEDURES

## 2022-02-14 PROCEDURE — 6360000002 HC RX W HCPCS: Performed by: ANESTHESIOLOGY

## 2022-02-14 PROCEDURE — 73502 X-RAY EXAM HIP UNI 2-3 VIEWS: CPT

## 2022-02-14 PROCEDURE — 80048 BASIC METABOLIC PNL TOTAL CA: CPT

## 2022-02-14 PROCEDURE — 93005 ELECTROCARDIOGRAM TRACING: CPT | Performed by: STUDENT IN AN ORGANIZED HEALTH CARE EDUCATION/TRAINING PROGRAM

## 2022-02-14 PROCEDURE — 2709999900 HC NON-CHARGEABLE SUPPLY: Performed by: ORTHOPAEDIC SURGERY

## 2022-02-14 PROCEDURE — 72170 X-RAY EXAM OF PELVIS: CPT

## 2022-02-14 PROCEDURE — 6360000002 HC RX W HCPCS: Performed by: NURSE ANESTHETIST, CERTIFIED REGISTERED

## 2022-02-14 PROCEDURE — C1776 JOINT DEVICE (IMPLANTABLE): HCPCS | Performed by: ORTHOPAEDIC SURGERY

## 2022-02-14 PROCEDURE — 7100000001 HC PACU RECOVERY - ADDTL 15 MIN: Performed by: ORTHOPAEDIC SURGERY

## 2022-02-14 PROCEDURE — 99253 IP/OBS CNSLTJ NEW/EST LOW 45: CPT | Performed by: ORTHOPAEDIC SURGERY

## 2022-02-14 PROCEDURE — A4217 STERILE WATER/SALINE, 500 ML: HCPCS | Performed by: ORTHOPAEDIC SURGERY

## 2022-02-14 PROCEDURE — 3700000000 HC ANESTHESIA ATTENDED CARE: Performed by: ORTHOPAEDIC SURGERY

## 2022-02-14 PROCEDURE — 2500000003 HC RX 250 WO HCPCS: Performed by: NURSE ANESTHETIST, CERTIFIED REGISTERED

## 2022-02-14 PROCEDURE — 2580000003 HC RX 258: Performed by: PEDIATRICS

## 2022-02-14 PROCEDURE — 1200000000 HC SEMI PRIVATE

## 2022-02-14 PROCEDURE — 7100000000 HC PACU RECOVERY - FIRST 15 MIN: Performed by: ORTHOPAEDIC SURGERY

## 2022-02-14 DEVICE — OXINIUM FEMORAL HEAD 12/14 TAPER                                    36 MM M/+4
Type: IMPLANTABLE DEVICE | Site: HIP | Status: FUNCTIONAL
Brand: OXINIUM

## 2022-02-14 DEVICE — ANTHOLOGY STANDARD OFFSET POROUS                                    SIZE 6
Type: IMPLANTABLE DEVICE | Site: HIP | Status: FUNCTIONAL
Brand: ANTHOLOGY

## 2022-02-14 DEVICE — HIP H2 TOT ADV OTHER HD IMPL CAPPED H2 SN: Type: IMPLANTABLE DEVICE | Status: FUNCTIONAL

## 2022-02-14 DEVICE — R3 3 HOLE ACETABULAR SHELL 52MM
Type: IMPLANTABLE DEVICE | Site: HIP | Status: FUNCTIONAL
Brand: R3 ACETABULAR

## 2022-02-14 DEVICE — R3 0 DEGREE XLPE ACETABULAR LINER                                    36MM INNER DIAMETER X OUTER DIAMETER 52MM
Type: IMPLANTABLE DEVICE | Site: HIP | Status: FUNCTIONAL
Brand: R3

## 2022-02-14 RX ORDER — TRANEXAMIC ACID 10 MG/ML
INJECTION, SOLUTION INTRAVENOUS PRN
Status: DISCONTINUED | OUTPATIENT
Start: 2022-02-14 | End: 2022-02-14 | Stop reason: SDUPTHER

## 2022-02-14 RX ORDER — CEFAZOLIN SODIUM 1 G/3ML
INJECTION, POWDER, FOR SOLUTION INTRAMUSCULAR; INTRAVENOUS PRN
Status: DISCONTINUED | OUTPATIENT
Start: 2022-02-14 | End: 2022-02-14 | Stop reason: SDUPTHER

## 2022-02-14 RX ORDER — PROPOFOL 10 MG/ML
INJECTION, EMULSION INTRAVENOUS PRN
Status: DISCONTINUED | OUTPATIENT
Start: 2022-02-14 | End: 2022-02-14 | Stop reason: SDUPTHER

## 2022-02-14 RX ORDER — ONDANSETRON 2 MG/ML
4 INJECTION INTRAMUSCULAR; INTRAVENOUS EVERY 6 HOURS PRN
Status: DISCONTINUED | OUTPATIENT
Start: 2022-02-14 | End: 2022-02-18 | Stop reason: HOSPADM

## 2022-02-14 RX ORDER — MIDAZOLAM HYDROCHLORIDE 1 MG/ML
INJECTION INTRAMUSCULAR; INTRAVENOUS PRN
Status: DISCONTINUED | OUTPATIENT
Start: 2022-02-14 | End: 2022-02-14 | Stop reason: SDUPTHER

## 2022-02-14 RX ORDER — MIDAZOLAM HYDROCHLORIDE 2 MG/2ML
2 INJECTION, SOLUTION INTRAMUSCULAR; INTRAVENOUS ONCE
Status: COMPLETED | OUTPATIENT
Start: 2022-02-14 | End: 2022-02-14

## 2022-02-14 RX ORDER — PHENYLEPHRINE HCL IN 0.9% NACL 1 MG/10 ML
SYRINGE (ML) INTRAVENOUS PRN
Status: DISCONTINUED | OUTPATIENT
Start: 2022-02-14 | End: 2022-02-14 | Stop reason: SDUPTHER

## 2022-02-14 RX ORDER — GABAPENTIN 600 MG/1
300 TABLET ORAL 3 TIMES DAILY
Status: DISCONTINUED | OUTPATIENT
Start: 2022-02-14 | End: 2022-02-18 | Stop reason: HOSPADM

## 2022-02-14 RX ORDER — SODIUM CHLORIDE 9 MG/ML
INJECTION, SOLUTION INTRAVENOUS CONTINUOUS
Status: DISCONTINUED | OUTPATIENT
Start: 2022-02-14 | End: 2022-02-14

## 2022-02-14 RX ORDER — METHOCARBAMOL 500 MG/1
750 TABLET, FILM COATED ORAL EVERY 8 HOURS
Status: DISCONTINUED | OUTPATIENT
Start: 2022-02-14 | End: 2022-02-18 | Stop reason: HOSPADM

## 2022-02-14 RX ORDER — SODIUM CHLORIDE, SODIUM LACTATE, POTASSIUM CHLORIDE, CALCIUM CHLORIDE 600; 310; 30; 20 MG/100ML; MG/100ML; MG/100ML; MG/100ML
INJECTION, SOLUTION INTRAVENOUS CONTINUOUS
Status: DISCONTINUED | OUTPATIENT
Start: 2022-02-14 | End: 2022-02-14

## 2022-02-14 RX ORDER — ALPRAZOLAM 1 MG/1
1 TABLET ORAL 2 TIMES DAILY PRN
Status: DISCONTINUED | OUTPATIENT
Start: 2022-02-14 | End: 2022-02-18 | Stop reason: HOSPADM

## 2022-02-14 RX ORDER — SODIUM CHLORIDE 0.9 % (FLUSH) 0.9 %
5-40 SYRINGE (ML) INJECTION PRN
Status: DISCONTINUED | OUTPATIENT
Start: 2022-02-14 | End: 2022-02-18 | Stop reason: HOSPADM

## 2022-02-14 RX ORDER — FENTANYL CITRATE 50 UG/ML
INJECTION, SOLUTION INTRAMUSCULAR; INTRAVENOUS PRN
Status: DISCONTINUED | OUTPATIENT
Start: 2022-02-14 | End: 2022-02-14 | Stop reason: SDUPTHER

## 2022-02-14 RX ORDER — MAGNESIUM HYDROXIDE 1200 MG/15ML
LIQUID ORAL CONTINUOUS PRN
Status: COMPLETED | OUTPATIENT
Start: 2022-02-14 | End: 2022-02-14

## 2022-02-14 RX ORDER — PROPOFOL 10 MG/ML
INJECTION, EMULSION INTRAVENOUS CONTINUOUS PRN
Status: DISCONTINUED | OUTPATIENT
Start: 2022-02-14 | End: 2022-02-14 | Stop reason: SDUPTHER

## 2022-02-14 RX ORDER — KETAMINE HCL IN NACL, ISO-OSM 100MG/10ML
SYRINGE (ML) INJECTION PRN
Status: DISCONTINUED | OUTPATIENT
Start: 2022-02-14 | End: 2022-02-14 | Stop reason: SDUPTHER

## 2022-02-14 RX ORDER — SODIUM CHLORIDE 9 MG/ML
25 INJECTION, SOLUTION INTRAVENOUS PRN
Status: DISCONTINUED | OUTPATIENT
Start: 2022-02-14 | End: 2022-02-14

## 2022-02-14 RX ORDER — BUPIVACAINE HYDROCHLORIDE 5 MG/ML
INJECTION, SOLUTION EPIDURAL; INTRACAUDAL
Status: COMPLETED | OUTPATIENT
Start: 2022-02-14 | End: 2022-02-14

## 2022-02-14 RX ORDER — IBUPROFEN 400 MG/1
400 TABLET ORAL EVERY 6 HOURS
Status: DISCONTINUED | OUTPATIENT
Start: 2022-02-14 | End: 2022-02-18

## 2022-02-14 RX ORDER — FENTANYL CITRATE 50 UG/ML
100 INJECTION, SOLUTION INTRAMUSCULAR; INTRAVENOUS ONCE
Status: COMPLETED | OUTPATIENT
Start: 2022-02-14 | End: 2022-02-14

## 2022-02-14 RX ORDER — VANCOMYCIN HYDROCHLORIDE 1 G/20ML
INJECTION, POWDER, LYOPHILIZED, FOR SOLUTION INTRAVENOUS PRN
Status: DISCONTINUED | OUTPATIENT
Start: 2022-02-14 | End: 2022-02-14 | Stop reason: ALTCHOICE

## 2022-02-14 RX ORDER — PHENOBARBITAL 64.8 MG/1
64.8 TABLET ORAL 2 TIMES DAILY
Status: DISCONTINUED | OUTPATIENT
Start: 2022-02-14 | End: 2022-02-18 | Stop reason: HOSPADM

## 2022-02-14 RX ORDER — GLYCOPYRROLATE 1 MG/5 ML
SYRINGE (ML) INTRAVENOUS PRN
Status: DISCONTINUED | OUTPATIENT
Start: 2022-02-14 | End: 2022-02-14 | Stop reason: SDUPTHER

## 2022-02-14 RX ORDER — POLYETHYLENE GLYCOL 3350 17 G/17G
17 POWDER, FOR SOLUTION ORAL DAILY
Status: DISCONTINUED | OUTPATIENT
Start: 2022-02-14 | End: 2022-02-18 | Stop reason: HOSPADM

## 2022-02-14 RX ORDER — OXYCODONE HYDROCHLORIDE 5 MG/1
5 TABLET ORAL EVERY 4 HOURS PRN
Status: DISCONTINUED | OUTPATIENT
Start: 2022-02-14 | End: 2022-02-18

## 2022-02-14 RX ORDER — ACETAMINOPHEN 500 MG
1000 TABLET ORAL EVERY 8 HOURS SCHEDULED
Status: DISCONTINUED | OUTPATIENT
Start: 2022-02-14 | End: 2022-02-18 | Stop reason: HOSPADM

## 2022-02-14 RX ORDER — SODIUM CHLORIDE 0.9 % (FLUSH) 0.9 %
5-40 SYRINGE (ML) INJECTION EVERY 12 HOURS SCHEDULED
Status: DISCONTINUED | OUTPATIENT
Start: 2022-02-14 | End: 2022-02-14

## 2022-02-14 RX ADMIN — PROPOFOL 30 MG: 10 INJECTION, EMULSION INTRAVENOUS at 11:41

## 2022-02-14 RX ADMIN — FENTANYL CITRATE 100 MCG: 50 INJECTION INTRAMUSCULAR; INTRAVENOUS at 09:44

## 2022-02-14 RX ADMIN — OXYCODONE 5 MG: 5 TABLET ORAL at 06:33

## 2022-02-14 RX ADMIN — FENTANYL CITRATE 12.5 MCG: 50 INJECTION, SOLUTION INTRAMUSCULAR; INTRAVENOUS at 12:06

## 2022-02-14 RX ADMIN — GABAPENTIN 300 MG: 600 TABLET ORAL at 21:30

## 2022-02-14 RX ADMIN — BUPIVACAINE HYDROCHLORIDE 15 ML: 5 INJECTION, SOLUTION EPIDURAL; INTRACAUDAL; PERINEURAL at 11:56

## 2022-02-14 RX ADMIN — Medication 100 MCG: at 13:31

## 2022-02-14 RX ADMIN — FENTANYL CITRATE 25 MCG: 50 INJECTION, SOLUTION INTRAMUSCULAR; INTRAVENOUS at 12:58

## 2022-02-14 RX ADMIN — PROPOFOL 20 MG: 10 INJECTION, EMULSION INTRAVENOUS at 11:36

## 2022-02-14 RX ADMIN — Medication 10 MG: at 11:27

## 2022-02-14 RX ADMIN — Medication 30 ML: at 09:48

## 2022-02-14 RX ADMIN — FENTANYL CITRATE 25 MCG: 50 INJECTION, SOLUTION INTRAMUSCULAR; INTRAVENOUS at 13:46

## 2022-02-14 RX ADMIN — Medication 0.2 MG: at 12:21

## 2022-02-14 RX ADMIN — ACETAMINOPHEN 1000 MG: 500 TABLET ORAL at 15:54

## 2022-02-14 RX ADMIN — MIDAZOLAM HYDROCHLORIDE 1 MG: 1 INJECTION, SOLUTION INTRAMUSCULAR; INTRAVENOUS at 11:54

## 2022-02-14 RX ADMIN — TRANEXAMIC ACID 1000 MG: 10 INJECTION, SOLUTION INTRAVENOUS at 11:50

## 2022-02-14 RX ADMIN — Medication 15 MG: at 11:20

## 2022-02-14 RX ADMIN — FENTANYL CITRATE 100 MCG: 50 INJECTION, SOLUTION INTRAMUSCULAR; INTRAVENOUS at 00:44

## 2022-02-14 RX ADMIN — IBUPROFEN 400 MG: 400 TABLET, FILM COATED ORAL at 05:52

## 2022-02-14 RX ADMIN — Medication 100 MCG: at 12:20

## 2022-02-14 RX ADMIN — METHOCARBAMOL TABLETS 750 MG: 500 TABLET, COATED ORAL at 16:42

## 2022-02-14 RX ADMIN — PROPOFOL 30 MG: 10 INJECTION, EMULSION INTRAVENOUS at 12:59

## 2022-02-14 RX ADMIN — PHENOBARBITAL 64.8 MG: 64.8 TABLET ORAL at 21:30

## 2022-02-14 RX ADMIN — TRANEXAMIC ACID 1000 MG: 10 INJECTION, SOLUTION INTRAVENOUS at 13:38

## 2022-02-14 RX ADMIN — Medication 100 MCG: at 13:04

## 2022-02-14 RX ADMIN — METHOCARBAMOL TABLETS 750 MG: 500 TABLET, COATED ORAL at 01:53

## 2022-02-14 RX ADMIN — GABAPENTIN 300 MG: 600 TABLET ORAL at 16:42

## 2022-02-14 RX ADMIN — Medication 100 MCG: at 13:36

## 2022-02-14 RX ADMIN — SODIUM CHLORIDE, POTASSIUM CHLORIDE, SODIUM LACTATE AND CALCIUM CHLORIDE: 600; 310; 30; 20 INJECTION, SOLUTION INTRAVENOUS at 01:43

## 2022-02-14 RX ADMIN — Medication 100 MCG: at 13:20

## 2022-02-14 RX ADMIN — Medication 100 MCG: at 12:10

## 2022-02-14 RX ADMIN — PROPOFOL 50 MCG/KG/MIN: 10 INJECTION, EMULSION INTRAVENOUS at 11:44

## 2022-02-14 RX ADMIN — SODIUM CHLORIDE: 9 INJECTION, SOLUTION INTRAVENOUS at 00:58

## 2022-02-14 RX ADMIN — DEXTROSE MONOHYDRATE 2000 MG: 50 INJECTION, SOLUTION INTRAVENOUS at 20:17

## 2022-02-14 RX ADMIN — OXYCODONE 5 MG: 5 TABLET ORAL at 18:06

## 2022-02-14 RX ADMIN — OXYCODONE 5 MG: 5 TABLET ORAL at 01:53

## 2022-02-14 RX ADMIN — OXYCODONE 5 MG: 5 TABLET ORAL at 22:43

## 2022-02-14 RX ADMIN — ONDANSETRON 4 MG: 2 INJECTION INTRAMUSCULAR; INTRAVENOUS at 16:51

## 2022-02-14 RX ADMIN — CEFAZOLIN 2000 MG: 330 INJECTION, POWDER, FOR SOLUTION INTRAMUSCULAR; INTRAVENOUS at 11:47

## 2022-02-14 RX ADMIN — ACETAMINOPHEN 1000 MG: 500 TABLET ORAL at 05:51

## 2022-02-14 RX ADMIN — PROPOFOL 20 MG: 10 INJECTION, EMULSION INTRAVENOUS at 11:21

## 2022-02-14 RX ADMIN — MIDAZOLAM 2 MG: 1 INJECTION INTRAMUSCULAR; INTRAVENOUS at 09:44

## 2022-02-14 RX ADMIN — Medication 5 MG: at 12:06

## 2022-02-14 RX ADMIN — Medication 100 MCG: at 13:27

## 2022-02-14 RX ADMIN — ACETAMINOPHEN 1000 MG: 500 TABLET ORAL at 22:43

## 2022-02-14 RX ADMIN — Medication 100 MCG: at 13:16

## 2022-02-14 RX ADMIN — ONDANSETRON 4 MG: 2 INJECTION INTRAMUSCULAR; INTRAVENOUS at 06:15

## 2022-02-14 RX ADMIN — IBUPROFEN 400 MG: 400 TABLET, FILM COATED ORAL at 16:42

## 2022-02-14 RX ADMIN — MIDAZOLAM HYDROCHLORIDE 1 MG: 1 INJECTION, SOLUTION INTRAMUSCULAR; INTRAVENOUS at 12:00

## 2022-02-14 RX ADMIN — FENTANYL CITRATE 12.5 MCG: 50 INJECTION, SOLUTION INTRAMUSCULAR; INTRAVENOUS at 12:02

## 2022-02-14 RX ADMIN — Medication 100 MCG: at 12:32

## 2022-02-14 ASSESSMENT — PAIN DESCRIPTION - ONSET
ONSET: GRADUAL
ONSET: ON-GOING

## 2022-02-14 ASSESSMENT — PULMONARY FUNCTION TESTS
PIF_VALUE: 0
PIF_VALUE: 3
PIF_VALUE: 0

## 2022-02-14 ASSESSMENT — PAIN DESCRIPTION - DESCRIPTORS
DESCRIPTORS: ACHING;DISCOMFORT
DESCRIPTORS: ACHING;DISCOMFORT

## 2022-02-14 ASSESSMENT — PAIN SCALES - GENERAL
PAINLEVEL_OUTOF10: 4
PAINLEVEL_OUTOF10: 9
PAINLEVEL_OUTOF10: 7
PAINLEVEL_OUTOF10: 9
PAINLEVEL_OUTOF10: 4
PAINLEVEL_OUTOF10: 0
PAINLEVEL_OUTOF10: 7
PAINLEVEL_OUTOF10: 0
PAINLEVEL_OUTOF10: 6
PAINLEVEL_OUTOF10: 6
PAINLEVEL_OUTOF10: 5
PAINLEVEL_OUTOF10: 0

## 2022-02-14 ASSESSMENT — PAIN DESCRIPTION - ORIENTATION
ORIENTATION: RIGHT
ORIENTATION: RIGHT

## 2022-02-14 ASSESSMENT — PAIN DESCRIPTION - PROGRESSION
CLINICAL_PROGRESSION: NOT CHANGED

## 2022-02-14 ASSESSMENT — PAIN DESCRIPTION - PAIN TYPE
TYPE: ACUTE PAIN;SURGICAL PAIN
TYPE: ACUTE PAIN

## 2022-02-14 ASSESSMENT — PAIN DESCRIPTION - LOCATION
LOCATION: RIB CAGE
LOCATION: HIP;RIB CAGE

## 2022-02-14 ASSESSMENT — PAIN DESCRIPTION - FREQUENCY
FREQUENCY: INTERMITTENT
FREQUENCY: CONTINUOUS

## 2022-02-14 ASSESSMENT — PAIN - FUNCTIONAL ASSESSMENT
PAIN_FUNCTIONAL_ASSESSMENT: 0-10
PAIN_FUNCTIONAL_ASSESSMENT: PREVENTS OR INTERFERES SOME ACTIVE ACTIVITIES AND ADLS

## 2022-02-14 NOTE — PROGRESS NOTES
Trauma Tertiary Survey    Admit Date: 2/13/2022  Hospital day 1    [facility]       Past Medical History:   Diagnosis Date    Migraines     Seizures (HCC)        Scheduled Meds:   polyethylene glycol  17 g Oral Daily    acetaminophen  1,000 mg Oral 3 times per day    ibuprofen  400 mg Oral Q6H    gabapentin  300 mg Oral TID    methocarbamol  750 mg Oral Q8H    ceFAZolin  2,000 mg IntraVENous Q8H     Continuous Infusions:  PRN Meds:sodium chloride flush, ondansetron, oxyCODONE    Subjective:      Patient tolerated right hip total arthroplasty without any complications. Patient is complaining of pain in her right hip appropriately so, is on multimodal pain therapy. Patient has not had diet, she has not had a bowel movement however will continue to monitor. No abdominal pain chest pain shortness of breath fevers chills nausea vomiting or other constitutional symptoms. Objective:     Patient Vitals for the past 8 hrs:   BP Temp Temp src Pulse Resp SpO2 Height Weight   02/14/22 1515 -- -- -- -- -- -- 5' (1.524 m) 98 lb (44.5 kg)   02/14/22 1453 (!) 120/99 97.6 °F (36.4 °C) Oral 92 12 100 % -- --   02/14/22 1430 121/87 97.6 °F (36.4 °C) -- 66 12 100 % -- --   02/14/22 1415 (!) 117/96 -- -- 63 12 100 % -- --   02/14/22 1404 118/87 97.5 °F (36.4 °C) Temporal 70 12 100 % -- --   02/14/22 1005 124/87 -- -- 82 13 100 % -- --   02/14/22 1000 (!) 125/98 -- -- 76 12 100 % -- --       I/O last 3 completed shifts:  In: -   Out: 1300 [Urine:1300]  I/O this shift:  In: 1200 [I.V.:1200]  Out: 500 [Urine:100; Blood:400]    Radiology:XR CHEST (SINGLE VIEW FRONTAL)    Result Date: 2/14/2022  No acute process. XR PELVIS (1-2 VIEWS)    Result Date: 2/14/2022  Fracture of the right femoral neck is again noted. No dislocation of the right femoral head. Osteopenia of the bones and contrast filled bladder over the central pelvis. No displacement is seen of the pelvic ring.      XR FEMUR RIGHT (MIN 2 VIEWS)    Result Date: 2/13/2022  Nondisplaced subcapital fracture of the right femoral neck. XR CHEST PORTABLE    Result Date: 2/13/2022  No acute process. CT CHEST ABDOMEN PELVIS W CONTRAST    Result Date: 2/13/2022  Mildly offset acute right femoral neck fracture. Status post cholecystectomy and hysterectomy. XR HIP 2-3 VW W PELVIS RIGHT    Result Date: 2/14/2022  Expected postoperative findings status post right hip arthroplasty. XR HIP 2-3 VW W PELVIS RIGHT    Result Date: 2/13/2022  Nondisplaced subcapital fracture of the right femoral neck. PHYSICAL EXAM:   GCS:  4 - Opens eyes on own   6 - Follows simple motor commands  5 - Alert and oriented    Pupil size:  Left 2 mm Right 2 mm  Pupil reaction: Yes  Wiggles fingers: Left Yes Right Yes  Hand grasp:   Left normal   Right normal  Wiggles toes: Left Yes    Right Yes  Plantar flexion: Left normal  Right normal    General: Well-developed, well-nourished, appears appropriate for documented age. HEENT: Normocephalic, atraumatic. Sclera white, pupils reactive. No cervical lymphadenopathy  Cardiovascular: Regular rate and rhythm. Pulses strong in bilateral upper and lower extremities. Capillary refill less than 2 seconds. Pulmonary: Lungs clear to auscultation bilaterally. No rales or rhonchi  Abdominal: Soft, nondistended, nontender. No palpable masses. Neuro: Reduced range of motion of the right lower extremity, dressings in place status post R DENIZ. Skin: No rashes lesions abrasions or bruises.       Spine:     Spine Tenderness ROM   Cervical 0 /10 Normal   Thoracic 0 /10 Normal   Lumbar 0 /10 Normal     Musculoskeletal    Joint Tenderness Swelling ROM   Right shoulder absent absent normal   Left shoulder absent absent normal   Right elbow absent absent normal   Left elbow absent absent normal   Right wrist absent absent normal   Left wrist absent absent normal   Right hand grasp absent absent normal   Left hand grasp absent absent normal   Right hip absent absent normal   Left hip absent absent normal   Right knee absent absent normal   Left knee absent absent normal   Right ankle absent absent normal   Left ankle absent absent normal   Right foot absent absent normal   Left foot absent absent normal           CONSULTS:   Orthopedic surgery    PROCEDURES: R DENIZ    INJURIES: Right femoral neck fracture       Patient Active Problem List   Diagnosis    Migraine without aura and without status migrainosus, not intractable    Generalized anxiety disorder    Chronic bilateral low back pain without sciatica    Primary osteoarthritis involving multiple joints    Seizure disorder (HCC)    Vestibular hypofunction of both ears    S/P total knee arthroplasty    Migraine    Familial hypercholesterolemia    Chronic vertigo    Anxiety    Closed fracture of neck of right femur (HCC)         Assessment/Plan:       -Weight bearing status: WBAT RLE  -Anterior right hip precautions  -Maintain optifoam to right hip  -Complete post op Abx  -DVT PPX  -advance diet as tolerated  -MMPT

## 2022-02-14 NOTE — ED NOTES
Bed: 50PED  Expected date:   Expected time:   Means of arrival:   Comments:     Bety Zavala RN  02/14/22 0129

## 2022-02-14 NOTE — ED PROVIDER NOTES
[address]   Emergency Department  Emergency Medicine Attending Sign-out     Care of Ericka Salas was assumed from previous attending Dr. Gunnar Trevino and is being seen for No chief complaint on file. .  The patient's initial evaluation and plan have been discussed with the prior provider who initially evaluated the patient. Attestation  I was available and discussed any additional care issues that arose and coordinated the management plans with the resident(s) caring for the patient during my duty period. Any areas of disagreement with resident's documentation of care or procedures are noted on the chart. I was personally present for the key portions of any/all procedures, during my duty period. I have documented in the chart those procedures where I was not present during the key portions. BRIEF PATIENT SUMMARY/MDM COURSE PER INITIAL PROVIDER:   RECENT VITALS:     Temp: 97.7 °F (36.5 °C),  Pulse: 79, Resp: 16, BP: (!) 144/100, SpO2: 100 %    This patient is a 61 y.o. Female with fall and subsequent hip fracture. Awaiting bed placement.        OUTSTANDING TASKS / ADDITIONAL COMMENTS   1. Bed placement     Eliza Weaver MD  Emergency Medicine Attending  [facility]        Claus Mata MD  02/14/22 9980

## 2022-02-14 NOTE — ED NOTES
Pt medicated for pain. Pt will attemp bedpan in 30 mins. Pt currently using periwick  Pt reminded deep breathing techniques and continues to show no s/s of distress.       Teresa Matute LPN  95/60/03 8969

## 2022-02-14 NOTE — PLAN OF CARE
Orthopedic Post-Operative Plan Note    Patient:  Cecilia Mcleod  YOB: 1965     61 y.o. female    Patient is a 61 y.o. female s/p R DENIZ, POD #0    Post operative plan:  -Weight bearing status: WBAT RLE  -Anterior right hip precautions  -Maintain optifoam to right hip  -Complete post op Abx  -DVT: Managed per primary service. Ok for Wo[place] beginning POD#1 from orthopedics.  Recommend lovenox 30mg subc BID for 5 weeks from date of injury or equivalent formulary/medication  -Recommend multimodal pain control:   Acetaminophen 500mg tablet, 2 q6h   Naproxen 500mg tablet, 1 q12h   Gabapentin 100mg tablet, TID   Cyclobenzaprine 10mg tablet, 1 q6h   Oxycodone 5mg tablet, 1-2 q4-6h  -Follow up post operative imaging  -Follow up with Dr. Florence Carlin in office in 10-14 days  -Please page ortho with questions    Timmy Foster DO  Orthopedic Surgery Resident, PGY-3  [address]

## 2022-02-14 NOTE — ANESTHESIA PROCEDURE NOTES
Spinal Block    Patient location during procedure: OR  Reason for block: primary anesthetic  Staffing  Performed: anesthesiologist   Anesthesiologist: Wing Song MD  Preanesthetic Checklist  Completed: patient identified, IV checked, site marked, risks and benefits discussed, surgical consent, monitors and equipment checked, pre-op evaluation, timeout performed, anesthesia consent given, oxygen available and patient being monitored  Spinal Block  Patient position: right lateral decubitus  Prep: ChloraPrep  Patient monitoring: cardiac monitor, continuous pulse ox and frequent blood pressure checks  Approach: midline  Location: L3/L4  Provider prep: mask and sterile gloves  Local infiltration: lidocaine  Agent: bupivacaine  Dose: 2  Dose: 2  Needle  Needle type: Quincke   Needle gauge: 24 G  Needle length: 3.5 in  Assessment  Sensory level: T8  Swirl obtained: Yes  CSF: clear  Attempts: 1  Hemodynamics: stable

## 2022-02-14 NOTE — BRIEF OP NOTE
Brief Postoperative Note      Patient: Jayden Spencer  YOB: 1965  MRN: [id number]   CSN: [id number]     Date of Procedure: 2/14/2022    Pre-Op Diagnosis: RIGHT FEMORAL NECK FRACTURE    Post-Op Diagnosis: Right femoral neck fracture       Procedure(s):  1. Right total hip arthroplasty; CPT 73334    Surgeon(s):  Elaina Palacio DO    Assistant:  Resident: Lou Ng DO; Kedar Wylie MD; Jose Mejia DO    Anesthesia: Spinal    Estimated Blood Loss (mL): 200     Fluids: 1200mL crystalloids    Complications: None    Specimens:   * No specimens in log *    Implants:  S&N 52 mm R3 cup, size 6 anthology std offset stem, 36 mm +4 mm oxinium head  Implant Name Type Inv. Item Serial No.  Lot No. LRB No. Used Action   SHELL ACET [id number] 3 H STD HIP POLY POR PRESSFIT R3 - [id number]  SHELL ACET [id number] 3 H STD HIP POLY POR PRESSFIT R3  [facility] AND [facility] ORTHOPAEDICS- [id number] Right 1 Implanted   LINER ACET OD52MM ID36MM TAPR REGION THK4.3MM LOAD BEAR - [id number]  LINER ACET OD52MM ID36MM TAPR REGION THK4.3MM LOAD [place] AND NE[employer] [id number] Right 1 Implanted   STEM FEM SZ 6 STD OFFSET HIP POR JOD[employer]TH[employer] - [id number]  STEM FEM SZ 6 STD OFFSET HIP POR JODI ANTHOLOGY  [place] AND NEPH ORTHOPAEDICS- [id number] Right 1 Implanted   HEAD FEM [id number] +4MM OFFSET 12/14 TAPR OXINIUM JODI REV - [id number]  HEAD FEM [id number] +4MM OFFSET 12/14 TAPR OXINIUM JODI REV  [place] AND NEPH ORTHOPAEDICS- [id number] Right 1 Implanted         Drains:   Urethral Catheter Temperature probe (Active)   Catheter Indications Prolonged immobilization (e.g. unstable thoracic or lumbar spine, multiple traumatic injuries such as pelvic fractures) 02/14/22 0750   Site Assessment Pink 02/14/22 0324   Urine Color Yellow 02/14/22 0750   Urine Appearance Clear 02/14/22 0750   Output (mL) 750 mL 02/14/22 0324       Findings: Right femoral neck fracture. See op note for details.     Electronically signed by Tanisha Greco DO on 2/14/2022 at 1:39 PM

## 2022-02-14 NOTE — PROGRESS NOTES
Progress Note    Patient:  Jimy Fonseca  YOB: 1965     61 y.o. female    Subjective:  Patient seen and examined at bedside this morning. No new complaints or concerns per patient this morning. States she is sill having pain in the hip and feels like she is \"swollen. \"    No acute issues overnight per nursing. Still reporting dysesthesias in the S1 distribution. Denies: fever/chills, HA, CP, SOB, N/V. Has reid catheter in.       Objective:   Vitals:    02/14/22 0246   BP: (!) 135/97   Pulse:    Resp:    Temp:    SpO2: 96%     Gen: NAD, cooperative   Cardiovascular: Regular rate  Respiratory: no audible wheezing, symmetrical chest expansion   MSK: Right lower extremity: Tenderness to palpation along hip. No ecchymoses, abrasions, deformity, or lacerations. Skin intact. Compartments soft and compressible. EHL/FHL/TA/GSC gross motor intact. Sural, saphenous, superificial/deep peroneal, and plantar nerve distribution SILT although dysesthesias in sural nerve distribution. DP pulses 2+ with BCR; foot warm and perfused. Recent Labs     02/13/22  1914 02/14/22  0041   WBC 6.3  --    HGB 14.8  --    HCT 44.8  --      --    INR  --  1.0     --    K 3.9  --    BUN 8  --    CREATININE 0.44*  --    GLUCOSE 101*  --        Meds:   See rec for complete list    Impression: 61 y.o. female being seen and evaluated for right femoral neck fracture s/p fall from standing height       Plan:     -Plan for OR today for right total hip arthroplasty with Dr. Lila Beck   -Risk stratification calculation documented in chart. Will request formal clearance note from primary team   -NPO since midnight. Remain NPO until after surgery   -NWB RLE   -Hemoglobin yesterday 14.8. Pending hemoglobin this AM.   -Ancef OCTOR   -Pain control: per primary team discretion   -Ice (20 min, 1 hour off) for edema/pain control  -DVT ppx: EPC.  Please hold chemical anticoagulation until POD#1   -PT/OT to

## 2022-02-14 NOTE — CONSULTS
TRAUMA HISTORY AND PHYSICAL EXAMINATION    PATIENT NAME: Darci Ann  YOB: 1965  MEDICAL RECORD NO. [id number]   DATE: 2/13/2022  PRIMARY CARE PHYSICIAN: Zeenat Morris MD  PATIENT EVALUATED AT THE REQUEST OF : Ofelia    ACTIVATION   []Trauma Alert     [] Trauma Priority     [x]Trauma Consult. IMPRESSION:     Patient Active Problem List   Diagnosis    Migraine without aura and without status migrainosus, not intractable    Generalized anxiety disorder    Chronic bilateral low back pain without sciatica    Primary osteoarthritis involving multiple joints    Seizure disorder (HCC)    Vestibular hypofunction of both ears    S/P total knee arthroplasty    Migraine    Familial hypercholesterolemia    Chronic vertigo    Anxiety       MEDICAL DECISION MAKING AND PLAN:     Right transcervical femoral neck fracture  ED to obtain CT abd/pelv  If no emergent surgical need from CT, patient to be transferred to established orthopedic surgery team at [facility] from [address] [place] [address]    [] Neurosurgery     [x] Orthopedic Surgery    [] Cardiothoracic     [] Facial Trauma    [] Plastic Surgery (Burn)    [] Pediatric Surgery     [] Internal Medicine    [] Pulmonary Medicine    [] Other:        HISTORY:   INJURY SUMMARY  Right transcervical femoral neck fracture    GENERAL DATA  Age 61 y.o.  female   Patient information was obtained from patient. History/Exam limitations: none.   Patient presented to the Emergency Department by ambulance  Injury Date: 2/13/2022         Transport mode:   [x]Ambulance      [] Helicopter     []Car       [] Other    INJURY LOCATION, (e.g., home, farm, industry, street)  Specific Details of Location (e.g., bedroom, kitchen, garage): home    MECHANISM OF INJURY        [x] Fall    [x]From Standing     []From Height  Ft     []Down Stairs ___steps    HISTORY:     Darci Ann is a 61 y.o. female that presented to the Emergency Department following a fall at home. Patient was taking down cricket decoration when her right foot got caught in the cuff of her left pant leg. This resulted in patient falling to the ground on her hardwood floor on her right side. Patient experiencing pain over right hip, right ribs, and right shoulder. Patient denies hitting her head, denies LOC, denies blood thinners. Patient has extensive orthopedic surgery history including bilateral knee replacements. Imaging in the ED revealed Right transcervical femoral neck fracture. Patient states that all of her orthopedic surgeries were done at [facility] and expresses the desire to be transferred there for her surgery. Patient has seizure history treated with phenobarbital. Patient denies chest pain, shortness of breath. Denies tobacco, alcohol, and drug use. Loss of Consciousness [x]No   []Yes Duration(min)       [] Unknown       MEDICATIONS:   []  None     []  Information not available due to exam limitations documented above  Prior to Admission medications    Medication Sig Start Date End Date Taking?  Authorizing Provider   AL[facility]AZolam [facility]) 1 MG tablet TAKE 1 TABLET BY MOUTH TWICE DAILY AS NEEDED 1/31/22 3/2/22  India Jamil MD   cyclobenzaprine (FLEXERIL) 10 MG tablet TAKE 1 TABLET BY MOUTH THREE TIMES DAILY AS NEEDED 10/25/21   India Jamil MD   ibuprofen (ADVIL;MOTRIN) 800 MG tablet TK 1 T PO TID PRN 7/13/21   India Jamil MD   SUMAtriptan (IMITREX) 100 MG tablet TAKE 1 TABLET BY MOUTH DAILY AS NEEDED FOR MIGRAINE 7/12/21   India Jamil MD   ondansetron [facility]) 4 MG tablet Take 1 tablet by mouth every 8 hours as needed for Nausea 7/6/21   India Jamil MD   meclizine (ANTIVERT) 12.5 MG tablet Take 1 tablet by mouth 3 times daily as needed for Dizziness 12/2/19   ZUHAIR Castañeda - CNP       ALLERGIES:   []  None    []   Information not available due to exam limitations documented above   Carbamazepine, Metoclopramide, Phenytoin sodium extended, Topiramate, Verapamil, Sulfamethoxazole-trimethoprim, and Amoxicillin    PAST MEDICAL HISTORY: []  None   []   Information not available due to exam limitations documented above    has a past medical history of Migraines and Seizures ([facility] [address] has a past surgical history that includes shoulder surgery (Left, ); Mandible fracture surgery;  section; Hysterectomy, total abdominal; Total knee arthroplasty (Right, 2019); Total knee arthroplasty (Left, 2017); and Cholecystectomy, laparoscopic (). FAMILY HISTORY   []   Information not available due to exam limitations documented above    family history includes Cancer in her brother, maternal grandmother, and mother; Heart Disease in her mother. SOCIAL HISTORY  []   Information not available due to exam limitations documented above     reports that she has never smoked. She has never used smokeless tobacco.   reports current alcohol use. reports no history of drug use.     PERTINENT SYSTEMIC REVIEW:    []   Information not available due to exam limitations documented above    GEN: no malaise, fatigue  NEURO: no headaches, weakness, parasthesias  HEENT: no vision changes or facial trauma  CVS: no chest pain or palpitations  PULM: no cough, dyspnea or wheezing  GI: no nausea, vomiting or abdominal pain  MUSK: no neck or back pain  HEME: no anticoagulation or easy bruising       PHYSICAL EXAMINATION:     GLASCOW COMA SCALE  NEUROMUSCULAR BLOCKADE PRIOR TO ARRIVAL     [x]No        []Yes      Variable  Score   Variable  Score  Eye opening [x]Spontaneous 4 Verbal  [x]Oriented  5     []To voice  3   []Confused  4    []To pain  2   []Inapp words  3    []None  1   []Incomp words 2       []None  1   Motor   [x]Obeys  6    []Localizes pain 5    []Withdraws(pain) 4    []Flexion(pain) 3  []Extension(pain) 2    []None  1     GCS Total = 15    PHYSICAL EXAMINATION    VITAL SIGNS:   Vitals:    22 1757   BP: (!) 163/102   Pulse: 89   Resp: 20   SpO2: 97%       General Appearance: AAOx3, conversant, in mild distress due to pain  Skin: warm and dry, no rash or erythema   Head: normocephalic and atraumatic   Eyes: PERRLA, EOMI  Ears: tympanic membrane clear bilaterally, external ear canals wnl  Nose: nose without deformity  Neck: collared, no cervical tenderness  Back: no abrasion, step offs, or thoraco-lumbar tenderness  Pulmonary/Chest: CTAB, good air entry bilaterally  Cardiovascular: normal rate, regular rhythm  Abdomen: soft, non-tender, non-distended   Pelvic: normal external genitalia, no perineal bruising or swelling  Extremities: Tender to palpation over right hip. no cyanosis, edema or gross deformity  Neurologic: moving all extremities, 5/5 strength throughout     FOCUSED ABDOMINAL SONOGRAM FOR TRAUMA (FAST): A good  quality examination was performed by Dr. Lopez Zhao and representative images were obtained. [x] No free fluid in the abdomen         RADIOLOGY    XR FEMUR RIGHT (MIN 2 VIEWS)    Result Date: 2/13/2022  EXAMINATION: XRAY VIEWS OF THE RIGHT FEMUR 2/13/2022 3:43 pm COMPARISON: None. HISTORY: ORDERING SYSTEM PROVIDED HISTORY: s/p fall TECHNOLOGIST PROVIDED HISTORY: s/p fall FINDINGS: FINDINGS: Nondisplaced subcapital fracture of the right femoral neck. .  There is no evidence of  dislocation. Right knee prosthesis with adequate alignment. . The remaining joint spaces appear well maintained. The soft tissues are unremarkable. Nondisplaced subcapital fracture of the right femoral neck. XR CHEST PORTABLE    Result Date: 2/13/2022  EXAMINATION: ONE XRAY VIEW OF THE CHEST 2/13/2022 3:43 pm COMPARISON: 02/12/2019 HISTORY: ORDERING SYSTEM PROVIDED HISTORY: rib pain, s/p fall standing, pneumo? pulm contusion? TECHNOLOGIST PROVIDED HISTORY: rib pain, s/p fall standing, pneumo? pulm contusion? FINDINGS: The lungs are without acute focal process. There is no effusion or pneumothorax. The cardiomediastinal silhouette is stable.  The osseous structures are stable. No acute process. XR HIP 2-3 VW W PELVIS RIGHT    Result Date: 2/13/2022  EXAMINATION: ONE XRAY VIEW OF THE PELVIS AND TWO XRAY VIEWS RIGHT HIP 2/13/2022 3:43 pm COMPARISON: None. HISTORY: ORDERING SYSTEM PROVIDED HISTORY: s/p fall standing TECHNOLOGIST PROVIDED HISTORY: s/p fall standing FINDINGS: Nondisplaced subcapital fracture of the right femoral neck. .  There is no evidence of  dislocation. . The  joint spaces appear well maintained. The soft tissues are unremarkable. Nondisplaced subcapital fracture of the right femoral neck. LABS    Labs Reviewed   CBC WITH AUTO DIFFERENTIAL - Abnormal; Notable for the following components:       Result Value    Immature Granulocytes 1 (*)     All other components within normal limits   BASIC METABOLIC PANEL - Abnormal; Notable for the following components:    Glucose 101 (*)     CREATININE 0.44 (*)     All other components within normal limits   COVID-19, RAPID   TOX SCR, BLD, ED         Juan R Almaraz, DO  2/13/22, 8:24 PM            Trauma Attending Attestation      I have reviewed the above GCS note(s) and confirmed the key elements of the medical history and physical exam. I have seen and examined the pt. I have discussed the findings, established the care plan and recommendations with Resident, GCS RN, bedside nurse.         Radhika Peng, DO  2/13/2022  8:50 PM

## 2022-02-14 NOTE — ED NOTES
Patterson cath placed due to urinary retention. Pt tolerated procedure well. Abdominal distention improved along with pressure. Pt repositioned and continues to show no s/s of distress.       Beni Jama LPN  73/10/02 0143

## 2022-02-14 NOTE — FLOWSHEET NOTE
SPIRITUAL CARE DEPARTMENT - [address]  PROGRESS NOTE    Shift date: 2/14/22  Shift day: Monday   Shift # 3    Room # 50PED/50PED   Name: Darci Ann            Age: 61 y.o. Gender: female          Church: Unknown   Place of Judaism: Unknown    Referral: Routine Visit    Admit Date & Time: 2/13/2022  5:57 PM    PATIENT/EVENT DESCRIPTION:  Darci Ann is a 61 y.o. female in room 48 of ED. SPIRITUAL ASSESSMENT/INTERVENTION:  Upon arrival, nurse notified Jose A Pena writing note that the patient would be going to sleep.  will remain available as needed. SPIRITUAL CARE FOLLOW-UP PLAN:  Chaplains will remain available to offer spiritual and emotional support as needed.       Electronically signed by Royce Cooks Resident, on 2/14/2022 at 3:13 AM.  Kunal Simpson  [phone]

## 2022-02-14 NOTE — FLOWSHEET NOTE
SPIRITUAL CARE DEPARTMENT - [address]  PROGRESS NOTE    Shift date: 2/13/2022  Shift day: Sunday    Shift # 2    Room # 36/36   Name: Simona Beaulieu            Age: 61 y.o. Gender: female          Oriental orthodox: No Tenriism on file   Place of Bahai:      Referral: Trauma Consult    Admit Date & Time: 2/13/2022  5:57 PM    PATIENT/EVENT DESCRIPTION:  Simona Beaulieu is a 61 y.o. female   who fell breaking here femur. SPIRITUAL ASSESSMENT/INTERVENTION:   provide compassionate and active listening to patient who was in great distress as a result of her injuries.  inquired of pt's spiritual tradition. Pt expressed that she is Congregation.  offered prayer for healing and well being and served as liaison between the patient and medical team,. SPIRITUAL CARE FOLLOW-UP PLAN:  Chaplains will remain available to offer spiritual and emotional support as needed.           02/13/22 9704   Encounter Summary   Services provided to: Patient and family together   Referral/Consult From: Multi-disciplinary team   Support System Children   Continue Visiting   (2/13/22)   Complexity of Encounter Moderate   Length of Encounter 1 hour   Spiritual Assessment Completed Yes   Crisis   Type Trauma   Assessment Tearful;Coping  (Pain and sadness)   Intervention Active listening;Explored feelings, thoughts, concerns;Prayer;Discussed belief system/[employer] practices/mario   Outcome Expressed gratitude       Electronically signed by Chaplain Resident Katina Knight, [address] 2/13/2022 at 11:39 PM   [facility]  [phone]

## 2022-02-14 NOTE — ED NOTES
Pt having c/o pressure with abdomen distention. Bladder scan have PVR of 786mL. Writer perfect served Dr. Car Scanlon. Will await response.       Amy Walters LPN  74/67/61 7221

## 2022-02-14 NOTE — ED PROVIDER NOTES
[facility] ED  Emergency Department Encounter  EmergencyMedicine Resident     Pt Name:Devi Sales  MRN: [id number]  [place] 1965  Date of evaluation: 2/13/22  PCP:  Henry Joya MD    CHIEF COMPLAINT       No chief complaint on file. HISTORY OF PRESENT ILLNESS  (Location/Symptom, Timing/Onset, Context/Setting, Quality, Duration, Modifying Factors, Severity.)      Bernie Castro is a 61 y.o. female with pmhx of   WT:      Dx: <principal problem not specified>     Patient Active Problem List   Diagnosis    Migraine without aura and without status migrainosus, not intractable    Generalized anxiety disorder    Chronic bilateral low back pain without sciatica    Primary osteoarthritis involving multiple joints    Seizure disorder (HCC)    Vestibular hypofunction of both ears    S/P total knee arthroplasty    Migraine    Familial hypercholesterolemia    Chronic vertigo    Anxiety     PMH:  has a past medical history of Migraines and Seizures ([facility] [address] who presents with via EMS after fall from standing height. Patient states she was putting away Alex tree and ornaments today and her loosefitting pajamas. Tripped on her pajama pants and fell on her right hip. Patient has rib pain lower right rib cage and right hip pain as well as some abdominal pain. No vomiting. Patient states that she laid on the floor for 20 minutes and was unable to move her right leg and realized her pain was severe. Somehow was able to get her phone to call EMS. No family at bedside. Vital signs stable in route. Not on any anticoagulation. Allergies to Reglan, Dilantin and Bactrim. Last meal lunch. Patient is in extreme 10 out of 10 pain in her right hip. She is lives alone and takes care of her self otherwise. Patient is requesting to be transferred to [facility] for her orthopedic surgeons.   She states she begged EMS to take her there, and relates she was brought to [facility]. Kalina Rand against her wishes. Patient is a multiple prior orthopedic surgeries from [facility]. PAST MEDICAL / SURGICAL / SOCIAL / FAMILY HISTORY      has a past medical history of Migraines and Seizures ([facility] [address] Reviewed     has a past surgical history that includes shoulder surgery (Left, ); Mandible fracture surgery;  section; Hysterectomy, total abdominal; Total knee arthroplasty (Right, 2019); Total knee arthroplasty (Left, 2017); and Cholecystectomy, laparoscopic (). Reviewed    Social History     Socioeconomic History    Marital status:      Spouse name: Not on file    Number of children: Not on file    Years of education: Not on file    Highest education level: Not on file   Occupational History    Not on file   Tobacco Use    Smoking status: Never Smoker    Smokeless tobacco: Never Used   Substance and Sexual Activity    Alcohol use: Yes     Comment: Socially    Drug use: Never    Sexual activity: Not on file   Other Topics Concern    Not on file   Social History Narrative    Not on file     Social Determinants of Health     Financial Resource Strain: Low Risk     Difficulty of Paying Living Expenses: Not hard at all   Food Insecurity: No Food Insecurity    Worried About Running Out of Food in the Last Year: Never true    [address] in the Last Year: Never true   Transportation Needs:     Lack of Transportation (Medical): Not on file    Lack of Transportation (Non-Medical):  Not on file   Physical Activity:     Days of Exercise per Week: Not on file    Minutes of Exercise per Session: Not on file   Stress:     Feeling of Stress : Not on file   Social Connections:     Frequency of Communication with Friends and Family: Not on file    Frequency of Social Gatherings with Friends and Family: Not on file    Attends Denominational Services: Not on file    Active Member of Clubs or Organizations: Not on file    Attends Club or Organization Meetings: Not on file    Marital Status: Not on file   Intimate Partner Violence:     Fear of Current or Ex-Partner: Not on file    Emotionally Abused: Not on file    Physically Abused: Not on file    Sexually Abused: Not on file   Housing Stability:     Unable to Pay for Housing in the Last Year: Not on file    Number of Dottymoramirez in the Last Year: Not on file    Unstable Housing in the Last Year: Not on file       Family History   Problem Relation Age of Onset    Cancer Mother     Heart Disease Mother     Cancer Brother     Cancer Maternal Grandmother        Allergies:  Carbamazepine, Metoclopramide, Phenytoin sodium extended, Topiramate, Verapamil, Sulfamethoxazole-trimethoprim, and Amoxicillin    Home Medications:  Prior to Admission medications    Medication Sig Start Date End Date Taking? Authorizing Provider   ALPRAZolam Brina Hernandez) 1 MG tablet TAKE 1 TABLET BY MOUTH TWICE DAILY AS NEEDED 1/31/22 3/2/22  Estefani Cordova MD   cyclobenzaprine (FLEXERIL) 10 MG tablet TAKE 1 TABLET BY MOUTH THREE TIMES DAILY AS NEEDED 10/25/21   Estefani Cordova MD   ibuprofen (ADVIL;MOTRIN) 800 MG tablet TK 1 T PO TID PRN 7/13/21   Estefani Cordova MD   SUMAtriptan (IMITREX) 100 MG tablet TAKE 1 TABLET BY MOUTH DAILY AS NEEDED FOR MIGRAINE 7/12/21   Estefani Cordova MD   ondansetron [facility]) 4 MG tablet Take 1 tablet by mouth every 8 hours as needed for Nausea 7/6/21   Estefani Cordova MD   meclizine (ANTIVERT) 12.5 MG tablet Take 1 tablet by mouth 3 times daily as needed for Dizziness 12/2/19   ZUHAIR Sun - CNP       REVIEW OF SYSTEMS    (2-9 systems for level 4, 10 or more for level 5)      Review of Systems   Constitutional: Negative for chills and fever. HENT: Negative for congestion, rhinorrhea and sore throat. Eyes: Negative for discharge and redness. Respiratory: Negative for cough, choking, shortness of breath and wheezing.     Cardiovascular: Negative for chest pain and leg swelling. Gastrointestinal: Positive for abdominal pain. Negative for constipation, diarrhea, nausea and vomiting. Genitourinary: Negative for decreased urine volume, difficulty urinating, dysuria and menstrual problem. Musculoskeletal: Positive for gait problem. Negative for neck pain and neck stiffness. Skin: Negative for rash. Allergic/Immunologic: Negative for environmental allergies and food allergies. Neurological: Negative for speech difficulty and headaches. Psychiatric/Behavioral: Negative for behavioral problems. PHYSICAL EXAM   (up to 7 for level 4, 8 or more for level 5)      INITIAL VITALS:   BP (!) 137/100   Pulse 93   Temp 98.7 °F (37.1 °C)   Resp 18   SpO2 94%     Physical Exam  Vitals and nursing note reviewed. Constitutional:       General: She is not in acute distress. Appearance: Normal appearance. She is well-developed and normal weight. She is not ill-appearing, toxic-appearing or diaphoretic. Comments: BP (!) 163/102   Pulse 89   Resp 20   SpO2 97%      HENT:      Head: Normocephalic and atraumatic. Right Ear: Tympanic membrane, ear canal and external ear normal. There is no impacted cerumen. Left Ear: Tympanic membrane, ear canal and external ear normal. There is no impacted cerumen. Nose: Nose normal. No congestion or rhinorrhea. Mouth/Throat:      Mouth: Mucous membranes are moist.      Pharynx: No oropharyngeal exudate. Eyes:      General: No scleral icterus. Right eye: No discharge. Left eye: No discharge. Conjunctiva/sclera: Conjunctivae normal.      Pupils: Pupils are equal, round, and reactive to light. Neck:      Vascular: No carotid bruit. Comments: Normal range of motion of neck, no pain with cervical loading. Cervical thoracic and lumbar without step-offs deformities or midline tenderness  Cardiovascular:      Rate and Rhythm: Normal rate and regular rhythm. Pulses: Normal pulses. Heart sounds: Normal heart sounds. Pulmonary:      Effort: Pulmonary effort is normal. No respiratory distress. Breath sounds: Normal breath sounds. No wheezing. Abdominal:      General: Abdomen is flat. Bowel sounds are normal. There is no distension. Palpations: Abdomen is soft. Tenderness: There is abdominal tenderness. There is no guarding or rebound. Musculoskeletal:         General: Tenderness (right hip) present. Cervical back: Normal range of motion and neck supple. No rigidity or tenderness. Right lower leg: No edema. Left lower leg: No edema. Comments: Significant pain to right hip and right femur. Normal hip loading of left hip, no clicks or clunks, no pain with internal or external rotation of left hip with logroll. Patella mobile. Normal range of motion of left knee and ankle. Patient does have some decreased sensation to the lateral aspect of her right foot. Good pulses distally of both feet, good color, normal cap refill. There is decreased sensation to lateral aspect of right foot that affects proprioception testing as well of 4th and 5th toe. She has a plantar wart to the sole of her right foot as well. Lymphadenopathy:      Cervical: No cervical adenopathy. Skin:     General: Skin is warm. Capillary Refill: Capillary refill takes less than 2 seconds. Findings: No rash. Neurological:      General: No focal deficit present. Mental Status: She is alert and oriented to person, place, and time. Cranial Nerves: No cranial nerve deficit. Sensory: Sensory deficit (to lateral aspect of right foot) present.    Psychiatric:         Mood and Affect: Mood normal.         DIFFERENTIAL  DIAGNOSIS     PLAN (LABS / IMAGING / EKG):  Orders Placed This Encounter   Procedures    COVID-19, Rapid    XR CHEST PORTABLE    XR HIP 2-3 VW W PELVIS RIGHT    XR FEMUR RIGHT (MIN 2 VIEWS)    CT CHEST ABDOMEN PELVIS W CONTRAST    XR CHEST (SINGLE Mono # 0.56 0.10 - 1.20 k/uL    Absolute Eos # 0.07 0.00 - 0.44 k/uL    Basophils Absolute 0.03 0.00 - 0.20 k/uL    Absolute Immature Granulocyte 0.04 0.00 - 0.30 k/uL    WBC Morphology NOT REPORTED     RBC Morphology NOT REPORTED     Platelet Estimate NOT REPORTED    BASIC METABOLIC PANEL   Result Value Ref Range    Glucose 101 (H) 70 - 99 mg/dL    BUN 8 6 - 20 mg/dL    CREATININE 0.44 (L) 0.50 - 0.90 mg/dL    Bun/Cre Ratio NOT REPORTED 9 - 20    Calcium 9.5 8.6 - 10.4 mg/dL    Sodium 139 135 - 144 mmol/L    Potassium 3.9 3.7 - 5.3 mmol/L    Chloride 104 98 - 107 mmol/L    CO2 24 20 - 31 mmol/L    Anion Gap 11 9 - 17 mmol/L    GFR Non-African American >60 >60 mL/min    GFR African American >60 >60 mL/min    GFR Comment          GFR Staging NOT REPORTED    TOX SCR, BLD, ED   Result Value Ref Range    Acetaminophen Level <5 (L) 10 - 30 ug/mL    Ethanol <10 <10 mg/dL    Ethanol percent <6.950 <6.305 %    Salicylate Lvl <1 (L) 3 - 10 mg/dL    Toxic Tricyclic Sc,Blood NEGATIVE NEGATIVE       IMPRESSION: hip fracture, non-displaced    RADIOLOGY:  CT CHEST ABDOMEN PELVIS W CONTRAST   Final Result   Mildly offset acute right femoral neck fracture. Status post cholecystectomy and hysterectomy. XR CHEST PORTABLE   Final Result   No acute process. XR HIP 2-3 VW W PELVIS RIGHT   Final Result   Nondisplaced subcapital fracture of the right femoral neck. XR FEMUR RIGHT (MIN 2 VIEWS)   Final Result   Nondisplaced subcapital fracture of the right femoral neck. XR CHEST (SINGLE VIEW FRONTAL)    (Results Pending)         EMERGENCY DEPARTMENT COURSE:  58-year-old female arrived by EMS, hooked up to the monitor vital signs stable, patient did have a pain in the right side with palpation. Unable to move right proximal limb. Requesting to be sent to [facility] on arrival.  Patient vitals stable. Abdomen tender. With tender chest and right hip.   Cervical thoracic lumbar spine without step-offs deformities or tenderness to the midline. Hips otherwise stable. Patient with rotator cuff pain in left shoulder. Right arm normal.  She has no tenderness to her calvarium, normal range of motion of her neck no pain with cervical loading. No raccoon sign no hemotympanum oropharynx clear no jaw pain or trismus. Dentition is good. Tongue is normal.  Heart regular rhythm no murmurs, chest clear to station bilaterally. I do hear bowel sounds. Although her abdomen was tender to palpation in the suprapubic region. She was neurovascular intact distally to her bilateral lower extremities however she did have some numbness to the lateral fourth and fifth toes with changes in proprioception. Given the tenderness and mechanism of injury and age,  Images ordered, plain films and CT chest abdomen pelvis. Right fem neck fracture. Non-displaced. Page out to patients Ortho team at [facility], her preferred surgeon would not be one doing surgery, patient agreeable to stay here for surgery. Trauma and ortho consulted. Plan for surgery in the morning with Ortho. Trauma admit. Pain control in ED with fentanyl and norflex. ED Course as of 02/14/22 0027   [place] Found Feb 13, 2022   2218 Ct scan still note read by radiology. I do not see any acute bleed or hematoma process on imaging myself. After CT scan read, will move forward for transfer to [facility]   [LL]      ED Course User Index  [LL] Frank Florez MD       CONSULTS:  [address] TO TRAUMA SURGERY  IP CONSULT TO ORTHOPEDIC SURGERY    CRITICAL CARE:  Please see attending note    FINAL IMPRESSION      1. Closed fracture of neck of right femur, initial encounter ([facility] [address]    2. Fall from standing, initial encounter          DISPOSITION / PLAN     DISPOSITION Decision To Admit 02/13/2022 07:07:38 PM      PATIENT REFERRED TO:  No follow-up provider specified.     DISCHARGE MEDICATIONS:  New Prescriptions    No medications on file Dena Ramos MD  Emergency Medicine Resident    (Please note that portions of thisnote were completed with a voice recognition program.  Efforts were made to edit the dictations but occasionally words are mis-transcribed.)       Dena Ramos MD  Resident  02/14/22 [id number]

## 2022-02-14 NOTE — PROGRESS NOTES
PROGRESS NOTE          PATIENT NAME: Alexus Colunga  MEDICAL RECORD NO. [id number]  DATE: 2022    PRIMARY CARE PHYSICIAN: Ana Holbrook MD    HD: # 0    ASSESSMENT    Patient Active Problem List   Diagnosis    Migraine without aura and without status migrainosus, not intractable    Generalized anxiety disorder    Chronic bilateral low back pain without sciatica    Primary osteoarthritis involving multiple joints    Seizure disorder (HCC)    Vestibular hypofunction of both ears    S/P total knee arthroplasty    Migraine    Familial hypercholesterolemia    Chronic vertigo    Anxiety    Closed fracture of neck of right femur [facility])       MEDICAL DECISION MAKING AND PLAN    1. Closed fracture of Right femoral Neck   -Plan for OR today for partial versus total right hip arthroplasty  -Patient is hemodynamically stable, preop labs unremarkable, hemoglobin at 13.1, no elevated white count, coag panel normal.  -EKG normal sinus rhythm performed in ED. Patient is not had any chest pain shortness of breath or other constitutional symptoms.  -Pain control via MMPT      Chief Complaint: \"I fell\"    Evans Cuellar is a 59-year-old female admitted for right femoral neck fracture admitted to trauma surgery for further evaluation and treatment. Orthopedic surgery has evaluated the patient, plan for OR today  for total arthroplasty of right hip versus hemiarthroplasty. Patient has been n.p.o. as of midnight, she is scheduled for surgery. OBJECTIVE  VITALS: Temp: Temp: 97.7 °F (36.5 °C)Temp  Av.2 °F (36.8 °C)  Min: 97.7 °F (36.5 °C)  Max: 98.7 °F (06.8 °C) BP Systolic (61CKV), IOA:053 , Min:135 , FID:218   Diastolic (28WME), LXN:826, Min:97, Max:102   Pulse Pulse  Av.8  Min: 78  Max: 93 Resp Resp  Av  Min: 10  Max: 20 Pulse ox SpO2  Av.4 %  Min: 94 %  Max: 100 %     General: Well-developed, well-nourished, appears appropriate for documented age.   HEENT: Normocephalic, atraumatic. Sclera white, pupils reactive. No cervical lymphadenopathy  Cardiovascular: Regular rate and rhythm. Pulses strong in bilateral upper and lower extremities. Capillary refill less than 2 seconds. Pulmonary: Lungs clear to auscultation bilaterally. No rales or rhonchi  Abdominal: Soft, nondistended, nontender. No palpable masses. Neuro: Reacts appropriately to external stimuli, moves 3 extremities spontaneously, with limitation of the right lower extremity, likely due to fracture. Skin: No rashes lesions abrasions or bruises. I/O last 3 completed shifts:  In: -   Out: 1300 [Urine:1300]    Drain/tube output: In: -   Out: 1300 [Urine:1300]    LAB:  CBC:   Recent Labs     02/13/22 1914 02/14/22  0548   WBC 6.3 6.9   HGB 14.8 13.1   HCT 44.8 40.6   MCV 88.5 88.6    262     BMP:   Recent Labs     02/13/22 1914 02/14/22  0548    138   K 3.9 3.9    106   CO2 24 25   BUN 8 6   CREATININE 0.44* 0.35*   GLUCOSE 101* 105*     COAGS:   Recent Labs     02/14/22  0041   APTT 23.7   INR 1.0       RADIOLOGY:  CXR: XR CHEST (SINGLE VIEW FRONTAL)    Result Date: 2/14/2022  No acute process. XR PELVIS (1-2 VIEWS)    Result Date: 2/14/2022  Fracture of the right femoral neck is again noted. No dislocation of the right femoral head. Osteopenia of the bones and contrast filled bladder over the central pelvis. No displacement is seen of the pelvic ring. XR FEMUR RIGHT (MIN 2 VIEWS)    Result Date: 2/13/2022  Nondisplaced subcapital fracture of the right femoral neck. XR CHEST PORTABLE    Result Date: 2/13/2022  No acute process. CT CHEST ABDOMEN PELVIS W CONTRAST    Result Date: 2/13/2022  Mildly offset acute right femoral neck fracture. Status post cholecystectomy and hysterectomy. XR HIP 2-3 VW W PELVIS RIGHT    Result Date: 2/13/2022  Nondisplaced subcapital fracture of the right femoral neck.          Adam Caputo, DO  2/14/22, 8:11 AM           Trauma Attending Attestation      I have reviewed the above GCS note(s) and confirmed the key elements of the medical history and physical exam. I have seen and examined the pt. I have discussed the findings, established the care plan and recommendations with Resident, GCS RN, bedside nurse.         Jayson Wells DO  2/14/2022  12:14 PM

## 2022-02-14 NOTE — ANESTHESIA PROCEDURE NOTES
Peripheral Block    Patient location during procedure: pre-op  Start time: 2/14/2022 10:43 AM  End time: 2/14/2022 11:01 AM  Staffing  Performed: anesthesiologist   Anesthesiologist: Milagros Caban MD  Preanesthetic Checklist  Completed: patient identified, IV checked, site marked, risks and benefits discussed, surgical consent, monitors and equipment checked, pre-op evaluation, timeout performed, anesthesia consent given, oxygen available and patient being monitored  Peripheral Block  Patient position: supine  Prep: ChloraPrep  Patient monitoring: cardiac monitor, continuous pulse ox, frequent blood pressure checks and IV access  Block type: Fascia iliaca  Laterality: right  Injection technique: single-shot  Guidance: ultrasound guided  Local infiltration: lidocaine  Infiltration strength: 1 %  Dose: 3 mL  Provider prep: mask and sterile gloves  Local infiltration: lidocaine  Needle  Needle gauge: 21 G  Needle length: 10 cm  Needle localization: ultrasound guidance  Test dose: negative  Assessment  Injection assessment: negative aspiration for heme, no paresthesia on injection and local visualized surrounding nerve on ultrasound  Paresthesia pain: none  Slow fractionated injection: yes  Hemodynamics: stable  Additional Notes  U/S 98410.  (1) Under ultrasound guidance, a 21 gauge needle was inserted and placed under the fascia iliaca plane.  (2) Ultrasound was also used to visualize the spread of the anesthetic in close proximity to the nerve being blocked. (3) The nerve appeared anatomically normal, and (4 there were no apparent abnormal pathological findings on the image that were readily visible and related to the nerve being blocked. (5) A permanent ultrasound image was saved in the patient's record.       Plus 30 ml of 0.125% bupivacaine      Medications Administered  Bupivacaine (PF) (MARCAINE) 0.5 % injection, 15 mL  Reason for block: post-op pain management and at surgeon's request

## 2022-02-14 NOTE — PROGRESS NOTES
POST OP NOTE    SUBJECTIVE  Pt s/p Right total hip arthroplasty     OBJECTIVE  VITALS:  BP (!) 120/99   Pulse 92   Temp 97.6 °F (36.4 °C) (Oral)   Resp 12   Ht 5' (1.524 m)   Wt 98 lb (44.5 kg)   SpO2 100%   BMI 19.14 kg/m²         General: Well-developed, well-nourished, appears appropriate for documented age. HEENT: Normocephalic, atraumatic. Sclera white, pupils reactive. No cervical lymphadenopathy  Cardiovascular: Regular rate and rhythm. Pulses strong in bilateral upper and lower extremities. Capillary refill less than 2 seconds. Pulmonary: Lungs clear to auscultation bilaterally. No rales or rhonchi  Abdominal: Soft, nondistended, nontender. No palpable masses. Neuro: Reacts appropriately to external stimuli, moves all 4 extremities spontaneously, post op RLE reduced ROM  Skin: No rashes lesions abrasions or bruises. Wound dressings in place, no bleed through on R hip      ASSESSMENT  1. POD# 0 s/p R DENIZ    PLAN  1. Pain management-MMPT  2. DVT proph- lovenox 30mg BID  3. GI- advance diet as tolerated, miralax   4.  F/u ortho clinic in 10-14 days      Socorro Ochoa DO  Trauma/Surgery Service  2/14/2022 at 5:54 PM

## 2022-02-14 NOTE — PLAN OF CARE
Orthopedics Plan of Care     Patient unable to be transferred to [facility] for fracture management. Is now amenable to surgical fixation here at [id number]. Plan will be for OR 2/14 for right hip total arthroplasty versus right hip hemiarthroplasty with Dr. Claudia West.   -NPO effective now   -Ancef on call to OR   -Consented and surgical site marked   -Scheduled for surgery   -Would appreciate clearance note from primary team in preparation for surgery today (2/14)  -NWB RLE   -Please page ortho with questions     Olin Felty, DO  PGY-2 Orthopedic Surgery  12:33 AM 2/14/2022

## 2022-02-14 NOTE — ED PROVIDER NOTES
Faculty Sign-Out Attestation  Handoff taken on the following patient from prior Attending Physician: Artemio Murphy    I was available and discussed any additional care issues that arose and coordinated the management plans with the resident(s) caring for the patient during my duty period. Any areas of disagreement with residents documentation of care or procedures are noted on the chart. I was personally present for the key portions of any/all procedures during my duty period. I have documented in the chart those procedures where I was not present during the key portions. 79-year-old female with mechanical fall found to have a hip fracture. CT abdomen pelvis pending. Patient has requested transfer to [facility] to have surgery by her prior orthopedic surgeon. If no traumatic findings on the CT, will initiate transfer to [facility]. Resident spoke with orthopedics at [facility], patient's prior orthopedic surgeon is no longer practicing. Patient no longer wants to be transferred to [facility], trauma notified and patient to be admitted for orthopedic treatment.     Lorelei Gannon MD  Attending Physician        Lorelei Gannon MD  02/14/22 2337

## 2022-02-14 NOTE — ANESTHESIA PRE PROCEDURE
Department of Anesthesiology  Preprocedure Note       Name:  Miguel Glover   Age:  61 y.o.  :  1965                                          MRN:  [id number]         Date:  2022      Surgeon: Dashawn Varma):  Lebron Ruiz DO    Procedure: Procedure(s):  TOTAL HIP -VS- HEMIARTHROPLASTY  BEACH & NEPHEW ANTHOLOGY/R3 (NOTIFIED), FIDELINA    Medications prior to admission:   Prior to Admission medications    Medication Sig Start Date End Date Taking?  Authorizing Provider   ALPRAZolam Ginger Blocker) 1 MG tablet TAKE 1 TABLET BY MOUTH TWICE DAILY AS NEEDED 1/31/22 3/2/22  Renuka Wise MD   cyclobenzaprine (FLEXERIL) 10 MG tablet TAKE 1 TABLET BY MOUTH THREE TIMES DAILY AS NEEDED 10/25/21   Renuka Wise MD   ibuprofen (ADVIL;MOTRIN) 800 MG tablet TK 1 T PO TID PRN 21   Renuka Wise MD   SUMAtriptan (IMITREX) 100 MG tablet TAKE 1 TABLET BY MOUTH DAILY AS NEEDED FOR MIGRAINE 21   Renuka Wise MD   ondansetron [facility]) 4 MG tablet Take 1 tablet by mouth every 8 hours as needed for Nausea 21   Renuka Wise MD   meclizine (ANTIVERT) 12.5 MG tablet Take 1 tablet by mouth 3 times daily as needed for Dizziness 19   ZUHAIR Kingsley - CNP       Current medications:    Current Facility-Administered Medications   Medication Dose Route Frequency Provider Last Rate Last Admin    ceFAZolin (ANCEF) 2000 mg in dextrose 5 % 50 mL IVPB  2,000 mg IntraVENous On Call to [facility], DO        sodium chloride flush 0.9 % injection 5-40 mL  5-40 mL IntraVENous 2 times per day Edmund Broadus, DO        sodium chloride flush 0.9 % injection 5-40 mL  5-40 mL IntraVENous PRN Benton Broadus, DO        0.9 % sodium chloride infusion  25 mL IntraVENous PRN Benton Broadus, DO        polyethylene glycol (GLYCOLAX) packet 17 g  17 g Oral Daily Edmund Broadus, DO        lactated ringers infusion   IntraVENous Continuous Edmund Broadus, DO 85 mL/hr at 22 0143 New Bag at 22 0143    acetaminophen (TYLENOL) tablet 1,000 mg  1,000 mg Oral 3 times per day Lanis Barrio, DO   1,000 mg at 02/14/22 0551    ibuprofen (ADVIL;MOTRIN) tablet 400 mg  400 mg Oral Q6H Lanis Barrio, DO   400 mg at 02/14/22 3498    gabapentin (NEURONTIN) tablet 300 mg  300 mg Oral TID Lanis Barrio, DO        methocarbamol (ROBAXIN) tablet 750 mg  750 mg Oral Q8H Lanis Barrio, DO   750 mg at 02/14/22 0153    ondansetron (ZOFRAN) injection 4 mg  4 mg IntraVENous Q6H PRN Lanis Barrio, DO   4 mg at 02/14/22 0615    oxyCODONE (ROXICODONE) immediate release tablet 5 mg  5 mg Oral Q4H PRN Lanis Barrio, DO   5 mg at 02/14/22 4505     Current Outpatient Medications   Medication Sig Dispense Refill    ALPRAZolam (XANAX) 1 MG tablet TAKE 1 TABLET BY MOUTH TWICE DAILY AS NEEDED 60 tablet 0    cyclobenzaprine (FLEXERIL) 10 MG tablet TAKE 1 TABLET BY MOUTH THREE TIMES DAILY AS NEEDED 60 tablet 0    ibuprofen (ADVIL;MOTRIN) 800 MG tablet TK 1 T PO TID  tablet 2    SUMAtriptan (IMITREX) 100 MG tablet TAKE 1 TABLET BY MOUTH DAILY AS NEEDED FOR MIGRAINE 9 tablet 5    ondansetron (ZOFRAN) 4 MG tablet Take 1 tablet by mouth every 8 hours as needed for Nausea 60 tablet 0    meclizine (ANTIVERT) 12.5 MG tablet Take 1 tablet by mouth 3 times daily as needed for Dizziness 45 tablet 2       Allergies:     Allergies   Allergen Reactions    Carbamazepine Nausea Only and Nausea And Vomiting    Metoclopramide Other (See Comments)     Seizures      Phenytoin Sodium Extended Other (See Comments)     \"Causes seizure\"    Topiramate Nausea Only and Nausea And Vomiting     unknown    Verapamil Nausea Only and Nausea And Vomiting    Sulfamethoxazole-Trimethoprim Hives    Amoxicillin        Problem List:    Patient Active Problem List   Diagnosis Code    Migraine without aura and without status migrainosus, not intractable G43.009    Generalized anxiety disorder F41.1    Chronic bilateral low back pain without sciatica M54.50, G89.29    Primary osteoarthritis involving multiple joints M89.49    Seizure disorder ([facility] [address] G40.909    Vestibular hypofunction of both ears H83.2X3    S/P total knee arthroplasty Z96.659    Migraine G43.909    Familial hypercholesterolemia E78.01    Chronic vertigo R42    Anxiety F41.9    Closed fracture of neck of right femur ([facility] [address] S72.001A       Past Medical History:        Diagnosis Date    Migraines     Seizures ([facility] [address]        Past Surgical History:        Procedure Laterality Date     SECTION      CHOLECYSTECTOMY, LAPAROSCOPIC      HYSTERECTOMY, TOTAL ABDOMINAL      MANDIBLE FRACTURE SURGERY      SHOULDER SURGERY Left     TOTAL KNEE ARTHROPLASTY Right 2019    TOTAL KNEE ARTHROPLASTY Left 2017       Social History:    Social History     Tobacco Use    Smoking status: Never Smoker    Smokeless tobacco: Never Used   Substance Use Topics    Alcohol use: Yes     Comment: Socially                                Counseling given: Not Answered      Vital Signs (Current):   Vitals:    22 2344 22 0246 22 0634 22 0737   BP: (!) 137/100 (!) 135/97 (!) 137/100 (!) 144/100   Pulse: 93  78 79   Resp: 18  10 16   Temp: 98.7 °F (37.1 °C)   97.7 °F (36.5 °C)   TempSrc:    Temporal   SpO2: 94% 96% 95% 100%                                              BP Readings from Last 3 Encounters:   22 (!) 144/100   21 138/88   21 130/80       NPO Status: Time of last liquid consumption: 2200                        Time of last solid consumption: 0800                        Date of last liquid consumption: 22                        Date of last solid food consumption: 22    BMI:   Wt Readings from Last 3 Encounters:   21 98 lb 6.4 oz (44.6 kg)   21 99 lb (44.9 kg)   20 133 lb 6.4 oz (60.5 kg)     There is no height or weight on file to calculate BMI.    CBC:   Lab Results   Component Value Date    WBC 6.9 2022    RBC 4.58 2022    HGB 13.1 02/14/2022    HCT 40.6 02/14/2022    MCV 88.6 02/14/2022    RDW 13.5 02/14/2022     02/14/2022       CMP:   Lab Results   Component Value Date     02/14/2022    K 3.9 02/14/2022     02/14/2022    CO2 25 02/14/2022    BUN 6 02/14/2022    CREATININE 0.35 02/14/2022    GFRAA >60 02/14/2022    LABGLOM >60 02/14/2022    GLUCOSE 105 02/14/2022    PROT 6.8 07/13/2021    CALCIUM 8.9 02/14/2022    BILITOT 0.39 07/13/2021    ALKPHOS 76 07/13/2021    AST 14 07/13/2021    ALT 15 07/13/2021       POC Tests: No results for input(s): POCGLU, POCNA, POCK, POCCL, POCBUN, POCHEMO, POCHCT in the last 72 hours. Coags:   Lab Results   Component Value Date    PROTIME 10.6 02/14/2022    INR 1.0 02/14/2022    APTT 23.7 02/14/2022       HCG (If Applicable): No results found for: PREGTESTUR, PREGSERUM, HCG, HCGQUANT     ABGs: No results found for: PHART, PO2ART, ZOU2SCF, IJK5PZU, BEART, V2WVNMQR     Type & Screen (If Applicable):  No results found for: LABABO, LABRH    Drug/Infectious Status (If Applicable):  Lab Results   Component Value Date    HEPCAB NONREACTIVE 08/14/2019       COVID-19 Screening (If Applicable):   Lab Results   Component Value Date    COVID19 Not Detected 02/13/2022           Anesthesia Evaluation  Patient summary reviewed and Nursing notes reviewed no history of anesthetic complications:   Airway: Mallampati: II  TM distance: >3 FB   Neck ROM: full  Mouth opening: > = 3 FB Dental: normal exam         Pulmonary:Negative Pulmonary ROS and normal exam                               Cardiovascular:  Exercise tolerance: good (>4 METS),       (-) past MI, CAD, CABG/stent, dysrhythmias,  angina and  CHF      Rhythm: regular  Rate: normal           Beta Blocker:  Not on Beta Blocker         Neuro/Psych:   (+) headaches:, psychiatric history:            GI/Hepatic/Renal: Neg GI/Hepatic/Renal ROS            Endo/Other: Negative Endo/Other ROS                    Abdominal:             Vascular:           Other Findings:             Anesthesia Plan      spinal and regional     ASA 2       Induction: intravenous. MIPS: Postoperative opioids intended and Prophylactic antiemetics administered. Anesthetic plan and risks discussed with patient. Use of blood products discussed with patient whom consented to blood products.    Plan discussed with CRNA and surgical team.                  Benito Hart MD   2/14/2022

## 2022-02-14 NOTE — CONSULTS
Orthopedic Surgery Consult  (Dr. Claudean Ferrari)      CC/Reason for consult:  Right femoral neck fracture    HPI:      The patient is a 61 y.o. female who was putting away [place] decorations when she tripped on her pajamas and fell onto the wood floor about 4:30pm. She felt immediate pain in her right hip and was not able to ambulate after the fall. Patient was able to crawl and call for help after 20 minutes. Patient denies hitting her head or loss of consciousness. Patient reports right sided rib pain and right shoulder pain. Patient denies sharp pain with breathing, pain, or weakness with right shoulder movement. She does note that she has \"problems with her joints\" including the right hip at baseline. Patient notes increased tingling of her right foot, though admits to history of paresthesia associated with low back pain following a fall several years ago. Patient reports a history of grand mal seizures managed medically with the last seizure episode being 3 months ago when she missed a dose of her medication. Patient originally requested that the ambulance take her to [facility]. She still wishes to be transferred to [facility] to work with her established orthopedic team. The patient was educated on the timeline of care based on the etiology of her injury. Patient understood and accepted the potential risks regarding delaying definitive care of her right hip. Plan was communicated to the ED team to arrange transfer to [facility].    [facility] [employer] [facility],   Prior orthopedic injuries/surgeries: Bilateral TKA (Dr. Emerald Nunn ', ),   Medical history: Seizures, low back pain with radicular symptoms    Past Medical History  Amie Manriquez has a past medical history of Migraines and Seizures ([facility] [address] Past Surgical History  Amie Manriquez has a past surgical history that includes shoulder surgery (Left, ); Mandible fracture surgery;  section;  Hysterectomy, total abdominal; Total knee arthroplasty (Right, 12/12/2019); Total knee arthroplasty (Left, 07/13/2017); and Cholecystectomy, laparoscopic (2005). Current Medications  Reviewed. See EMR for details. Allergies  Allergies reviewed: Carbamazepine, Metoclopramide, Phenytoin sodium extended, Topiramate, Verapamil, Sulfamethoxazole-trimethoprim, and Amoxicillin    Social History  Patient reports that she has never smoked. She has never used smokeless tobacco. She reports current alcohol use. She reports that she does not use drugs. Family History  Patient family history includes Cancer in her brother, maternal grandmother, and mother; Heart Disease in her mother. ROS:   General: Patient denies fever or chills  CV: No chest pain. Resp: No SOB. MSK: Right hip pain, right shoulder pain, right-sided rib pain  Neuro: Tingling of right leg to foot. No numbness or weakness  10 point ROS negative other than stated above    PE:  Blood pressure (!) 163/102, pulse 89, resp. rate 20, SpO2 97 %. Gen: Alert and oriented, NAD, cooperative. Head: Normocephalic, atraumatic. Cardiovascular: Regular rate. Respiratory: Chest symmetric, no accessory muscle use. RUE: Skin intact. No ecchymoses, abrasion, deformity, or lacerations. Tender to palpation over right deltoid. No crepitus with shoulder ROM. Negative empty can and Hawkin's test. Compartments soft and easily compressible. Ulnar/median/AIN/PIN/radial motor intact. Axillary/MCN/median/ulnar/radial nerves SILT. Radial pulse 2+ with BCR. RLE: Skin intact. No ecchymoses, abrasions, deformity, or lacerations. Tender to palpation around right hip. Compartments soft and easily compressible. EHL/FHL/TA/GS complex motor intact. Saphenous/DPN/plantar nerve distribution SILT. SPN and Sural sensation diminished compared to contralateral side, but intact. Log roll, knee ligamentous exam, and straight leg raise deferred secondary to known hip fracture. DP/PT pulses 2+ with BCR. Labs  No results for input(s): WBC, HGB, HCT, PLT, INR, PTT, NA, K, BUN, CREATININE, GLUCOSE, SEDRATE, CRP in the last 72 hours. Invalid input(s): PT     Imaging   4-views of the right hip demonstrating a minimally displaced, complete transcervical femoral neck fracture. Osteopenic and moderate arthritis with osteophytic changes. No soft tissue abnormalities appreciated. Assessment/Plan: 61 y.o. female who fell from standing height, being seen for:    -Right transcervical femoral neck fracture    -Patient requested transfer to [facility] for care by her established orthopedic team given prior surgeries with Dr. Migue Palm  -Patient educated on the etiology of her injury and the importance of the timeline to optimize care. Risks of delayed care were discussed in great detail with the patient. She again indicated a desire to be transferred to [facility] and declined treatment at [address] [place] [address]   -Discussed patient's wishes with Emergency Department Team regarding transfer to another facility   -NWB RLE  -If patient remains in house, will have her NPO at midnight to assist with care continuity and timing to OR. -If still admitted, will reassess patient in the morning   -Pain control per primary  -Ice for pain and swelling  -OK to transfer to [facility] for definitive care from orthopedic perspective in accordance with patient wishes  -Please contact ortho with any questions    Inocencio Hernandez DO  Orthopedic Surgery Resident  7:02 PM 2/13/2022    PGY-2 Addendum    Patient seen and examined. Agree with above. 61 y.o. female being seen after a fall from standing height in which she sustained a right femoral neck fracture. Patient states that she was brought to the emergency department by ambulance after being unable to ambulate after the fall.   She states that she told EMS that she was to go to [facility], as she has seen orthopedic surgeons in the [address] [place] [address] previously, and she wanted to continue her care with the orthopedics team.  Despite her request, she was brought to [address] [place] [address] for further evaluation. She was found to have a right femoral neck fracture. She states that she ambulates unassisted at baseline. She does not take any blood thinners. She has a past medical history significant for seizures, for which she takes medication. Her last seizure was approximately 3 months ago. In addition to right hip pain, she also reports right shoulder pain but is able to move the right shoulder without any sensation of crepitus or instability. She also reports increased numbness and tingling on the dorsum of the foot and lateral aspect of the foot. She has baseline back pain with radiculopathy. Right upper extremity: Tender to palpation around shoulder. No decreased range of motion. No instability appreciated. Sensation intact to light touch throughout the extremity. Radial pulse 2+. Right lower extremity: No ecchymoses, abrasions, deformity, or lacerations. Tenderness to palpation surrounding the right hip. Compartments soft and compressible. EHL/FHL/TA/GSC gross motor intact. Sural, saphenous, superificial/deep peroneal, and plantar nerve distribution SILT although decreased sensation along SPN and sural nerve distribution compared with contralateral side. DP/PT pulses 2+ with BCR; foot warm and perfused. Deferred log roll, ligamentous knee exam, and straight leg raise due to patient pain. Assessment:   - Right transcervical femoral neck fracture     Plan:   -Risks and benefits of surgical intervention and delayed treatment in favor of transfer discussed in detail with the patient. The patient was adamant that she still wishes to be transferred and is refusing treatment at [address] [place] [address].   She verbalized that she understood the risks and benefits of delaying surgery.  -Relayed information to emergency department team regarding patient's desire to be transferred  -Pain control per emergency department protocols   -NPO at midnight in preparation for possible surgery with [facility] orthopedic surgeons   -Inquired if patient would be willing to fill out a consent form for surgery in the event she is unable to be transferred in a timely manner. Patient declined.    -Please page ortho with questions       Gurmeet Manning DO, PGY-2  Orthopedic Surgery Resident  [address] [facility]

## 2022-02-14 NOTE — ED NOTES
The following labs labeled with pt sticker and tubed to lab:     [] Blue     [] Lavender   [] on ice  [] Green/yellow  [] Green/black [] on ice  [] Yellow  [] Red  [] Pink      [x] COVID-19 swab    [x] Rapid  [] PCR  [] Flu swab  [] Peds Viral Panel     [] Urine Sample  [] Pelvic Cultures  [] Blood Cultures          Abida Jorgensen LPN  60/51/05 6206

## 2022-02-15 LAB
ABSOLUTE EOS #: 0.08 K/UL (ref 0–0.44)
ABSOLUTE IMMATURE GRANULOCYTE: <0.03 K/UL (ref 0–0.3)
ABSOLUTE LYMPH #: 1.41 K/UL (ref 1.1–3.7)
ABSOLUTE MONO #: 0.55 K/UL (ref 0.1–1.2)
ANION GAP SERPL CALCULATED.3IONS-SCNC: 7 MMOL/L (ref 9–17)
BASOPHILS # BLD: 0 % (ref 0–2)
BASOPHILS ABSOLUTE: <0.03 K/UL (ref 0–0.2)
BUN BLDV-MCNC: 8 MG/DL (ref 6–20)
BUN/CREAT BLD: ABNORMAL (ref 9–20)
CALCIUM SERPL-MCNC: 8.3 MG/DL (ref 8.6–10.4)
CHLORIDE BLD-SCNC: 104 MMOL/L (ref 98–107)
CO2: 25 MMOL/L (ref 20–31)
CREAT SERPL-MCNC: 0.41 MG/DL (ref 0.5–0.9)
DIFFERENTIAL TYPE: ABNORMAL
EKG ATRIAL RATE: 90 BPM
EKG P AXIS: 71 DEGREES
EKG P-R INTERVAL: 128 MS
EKG Q-T INTERVAL: 362 MS
EKG QRS DURATION: 74 MS
EKG QTC CALCULATION (BAZETT): 442 MS
EKG R AXIS: 72 DEGREES
EKG T AXIS: 73 DEGREES
EKG VENTRICULAR RATE: 90 BPM
EOSINOPHILS RELATIVE PERCENT: 1 % (ref 1–4)
GFR AFRICAN AMERICAN: >60 ML/MIN
GFR NON-AFRICAN AMERICAN: >60 ML/MIN
GFR SERPL CREATININE-BSD FRML MDRD: ABNORMAL ML/MIN/{1.73_M2}
GFR SERPL CREATININE-BSD FRML MDRD: ABNORMAL ML/MIN/{1.73_M2}
GLUCOSE BLD-MCNC: 102 MG/DL (ref 70–99)
HCT VFR BLD CALC: 31 % (ref 36.3–47.1)
HEMOGLOBIN: 10.1 G/DL (ref 11.9–15.1)
IMMATURE GRANULOCYTES: 0 %
LYMPHOCYTES # BLD: 21 % (ref 24–43)
MCH RBC QN AUTO: 29.4 PG (ref 25.2–33.5)
MCHC RBC AUTO-ENTMCNC: 32.6 G/DL (ref 28.4–34.8)
MCV RBC AUTO: 90.1 FL (ref 82.6–102.9)
MONOCYTES # BLD: 8 % (ref 3–12)
NRBC AUTOMATED: 0 PER 100 WBC
PDW BLD-RTO: 13.5 % (ref 11.8–14.4)
PLATELET # BLD: 210 K/UL (ref 138–453)
PLATELET ESTIMATE: ABNORMAL
PMV BLD AUTO: 9.8 FL (ref 8.1–13.5)
POTASSIUM SERPL-SCNC: 3.7 MMOL/L (ref 3.7–5.3)
RBC # BLD: 3.44 M/UL (ref 3.95–5.11)
RBC # BLD: ABNORMAL 10*6/UL
SEG NEUTROPHILS: 70 % (ref 36–65)
SEGMENTED NEUTROPHILS ABSOLUTE COUNT: 4.53 K/UL (ref 1.5–8.1)
SODIUM BLD-SCNC: 136 MMOL/L (ref 135–144)
WBC # BLD: 6.6 K/UL (ref 3.5–11.3)
WBC # BLD: ABNORMAL 10*3/UL

## 2022-02-15 PROCEDURE — 36415 COLL VENOUS BLD VENIPUNCTURE: CPT

## 2022-02-15 PROCEDURE — 97530 THERAPEUTIC ACTIVITIES: CPT

## 2022-02-15 PROCEDURE — 6370000000 HC RX 637 (ALT 250 FOR IP): Performed by: STUDENT IN AN ORGANIZED HEALTH CARE EDUCATION/TRAINING PROGRAM

## 2022-02-15 PROCEDURE — 6360000002 HC RX W HCPCS: Performed by: STUDENT IN AN ORGANIZED HEALTH CARE EDUCATION/TRAINING PROGRAM

## 2022-02-15 PROCEDURE — 97535 SELF CARE MNGMENT TRAINING: CPT

## 2022-02-15 PROCEDURE — 97166 OT EVAL MOD COMPLEX 45 MIN: CPT

## 2022-02-15 PROCEDURE — 85025 COMPLETE CBC W/AUTO DIFF WBC: CPT

## 2022-02-15 PROCEDURE — 93010 ELECTROCARDIOGRAM REPORT: CPT | Performed by: INTERNAL MEDICINE

## 2022-02-15 PROCEDURE — 2580000003 HC RX 258: Performed by: STUDENT IN AN ORGANIZED HEALTH CARE EDUCATION/TRAINING PROGRAM

## 2022-02-15 PROCEDURE — 97162 PT EVAL MOD COMPLEX 30 MIN: CPT

## 2022-02-15 PROCEDURE — 51701 INSERT BLADDER CATHETER: CPT

## 2022-02-15 PROCEDURE — 80048 BASIC METABOLIC PNL TOTAL CA: CPT

## 2022-02-15 PROCEDURE — 51798 US URINE CAPACITY MEASURE: CPT

## 2022-02-15 PROCEDURE — 1200000000 HC SEMI PRIVATE

## 2022-02-15 PROCEDURE — 97110 THERAPEUTIC EXERCISES: CPT

## 2022-02-15 RX ADMIN — PHENOBARBITAL 64.8 MG: 64.8 TABLET ORAL at 09:27

## 2022-02-15 RX ADMIN — METHOCARBAMOL TABLETS 750 MG: 500 TABLET, COATED ORAL at 17:57

## 2022-02-15 RX ADMIN — ACETAMINOPHEN 1000 MG: 500 TABLET ORAL at 21:18

## 2022-02-15 RX ADMIN — METHOCARBAMOL TABLETS 750 MG: 500 TABLET, COATED ORAL at 02:11

## 2022-02-15 RX ADMIN — SODIUM CHLORIDE, PRESERVATIVE FREE 10 ML: 5 INJECTION INTRAVENOUS at 08:13

## 2022-02-15 RX ADMIN — OXYCODONE 5 MG: 5 TABLET ORAL at 05:00

## 2022-02-15 RX ADMIN — GABAPENTIN 300 MG: 600 TABLET ORAL at 13:38

## 2022-02-15 RX ADMIN — ENOXAPARIN SODIUM 30 MG: 100 INJECTION SUBCUTANEOUS at 09:28

## 2022-02-15 RX ADMIN — ONDANSETRON 4 MG: 2 INJECTION INTRAMUSCULAR; INTRAVENOUS at 13:17

## 2022-02-15 RX ADMIN — GABAPENTIN 300 MG: 600 TABLET ORAL at 21:18

## 2022-02-15 RX ADMIN — IBUPROFEN 400 MG: 400 TABLET, FILM COATED ORAL at 17:58

## 2022-02-15 RX ADMIN — GABAPENTIN 300 MG: 600 TABLET ORAL at 08:30

## 2022-02-15 RX ADMIN — METHOCARBAMOL TABLETS 750 MG: 500 TABLET, COATED ORAL at 09:15

## 2022-02-15 RX ADMIN — PHENOBARBITAL 64.8 MG: 64.8 TABLET ORAL at 21:18

## 2022-02-15 RX ADMIN — DEXTROSE MONOHYDRATE 2000 MG: 50 INJECTION, SOLUTION INTRAVENOUS at 04:49

## 2022-02-15 RX ADMIN — OXYCODONE 5 MG: 5 TABLET ORAL at 10:05

## 2022-02-15 RX ADMIN — OXYCODONE 5 MG: 5 TABLET ORAL at 13:38

## 2022-02-15 RX ADMIN — OXYCODONE 5 MG: 5 TABLET ORAL at 17:57

## 2022-02-15 RX ADMIN — IBUPROFEN 400 MG: 400 TABLET, FILM COATED ORAL at 22:55

## 2022-02-15 RX ADMIN — ACETAMINOPHEN 1000 MG: 500 TABLET ORAL at 08:11

## 2022-02-15 RX ADMIN — ACETAMINOPHEN 1000 MG: 500 TABLET ORAL at 13:39

## 2022-02-15 ASSESSMENT — PAIN DESCRIPTION - ORIENTATION
ORIENTATION: RIGHT

## 2022-02-15 ASSESSMENT — PAIN SCALES - GENERAL
PAINLEVEL_OUTOF10: 9
PAINLEVEL_OUTOF10: 2
PAINLEVEL_OUTOF10: 4
PAINLEVEL_OUTOF10: 5
PAINLEVEL_OUTOF10: 9
PAINLEVEL_OUTOF10: 3
PAINLEVEL_OUTOF10: 8
PAINLEVEL_OUTOF10: 5
PAINLEVEL_OUTOF10: 6
PAINLEVEL_OUTOF10: 6

## 2022-02-15 ASSESSMENT — PAIN DESCRIPTION - PAIN TYPE
TYPE: SURGICAL PAIN
TYPE: SURGICAL PAIN;ACUTE PAIN
TYPE: SURGICAL PAIN
TYPE: ACUTE PAIN;SURGICAL PAIN

## 2022-02-15 ASSESSMENT — PAIN DESCRIPTION - DESCRIPTORS
DESCRIPTORS: SHARP;SHOOTING
DESCRIPTORS: DISCOMFORT
DESCRIPTORS: DISCOMFORT;SORE
DESCRIPTORS: SHARP;SHOOTING
DESCRIPTORS: SHARP;SHOOTING

## 2022-02-15 ASSESSMENT — PAIN DESCRIPTION - LOCATION
LOCATION: HIP;RIB CAGE
LOCATION: HIP
LOCATION: HIP;RIB CAGE
LOCATION: HIP

## 2022-02-15 ASSESSMENT — PAIN DESCRIPTION - ONSET
ONSET: ON-GOING

## 2022-02-15 ASSESSMENT — PAIN DESCRIPTION - FREQUENCY
FREQUENCY: CONTINUOUS

## 2022-02-15 ASSESSMENT — PAIN DESCRIPTION - PROGRESSION
CLINICAL_PROGRESSION: RAPIDLY WORSENING
CLINICAL_PROGRESSION: GRADUALLY WORSENING

## 2022-02-15 ASSESSMENT — PAIN - FUNCTIONAL ASSESSMENT
PAIN_FUNCTIONAL_ASSESSMENT: PREVENTS OR INTERFERES WITH ALL ACTIVE AND SOME PASSIVE ACTIVITIES
PAIN_FUNCTIONAL_ASSESSMENT: PREVENTS OR INTERFERES SOME ACTIVE ACTIVITIES AND ADLS
PAIN_FUNCTIONAL_ASSESSMENT: PREVENTS OR INTERFERES SOME ACTIVE ACTIVITIES AND ADLS

## 2022-02-15 NOTE — PROGRESS NOTES
Occupational Therapy   Occupational Therapy Initial Assessment  Date: 2/15/2022   Patient Name: Jayden Spencer  MRN: [id number]     : 1965    Date of Service: 2/15/2022    Discharge Recommendations:  Patient would benefit from continued therapy after discharge   Patient would benefit from continued acute care/post acute care OT services to address functional deficits through skilled intervention of ADL, balance, safety and transfer training, and succesful use of AE/DME, to promote functional outcomes. Further therapy recommended at discharge. The patient should be able to tolerate at least 3 hours of therapy per day over 5 days or 15 hours over 7 days. This patient may benefit from a Physical Medicine and Rehab consult. OT Equipment Recommendations  Equipment Needed: Yes  Mobility Devices: ADL Assistive Devices  ADL Assistive Devices: Shower Chair with back;Long-handled Sponge;Grab Bars - shower;Hand-held Shower; Reacher  Copied from Emergency Medicine:   65-year-old female with right complete, displaced femoral neck fracture as result of a ground-level fall. Patient remains active from a functional status and is overall healthy. Does have seizure disorder with last seizure being 3 months ago due to missed medication dose however states prior to this she has not had a seizure for well over 10 years. Reports being told she likely has rheumatoid arthritis based on clinical symptoms and strong family history however has not been formally diagnosed herself. Has undergone bilateral TKAs without complication and reports mild to moderate right hip pain prior to this injury. She works as an  in a nursing facility. Has been diagnosed with osteoporosis. Discussed ORIF versus kevin versus DENIZ. Ultimately, after a lengthy discussion with the patient, we both agree that DENIZ meets her functional demands and health status better than alternatives.   She states full understanding of the risks benefits alternatives. Written informed consent and her operative site marked. Patient has no motor deficits and states that the dysesthesias that normally run from her hip to her knee at baseline are present but extend down the lateral side of her leg since the new injury otherwise no sensory deficit. Assessment    Pt lying supine in bed upon entrance to room. Pt completed bed mobility with mod-max assistance. Pt sat at eob ~10 minutes with fair unsupported sitting balance, lateral lean to L to alleviate weight bearing on R hip due to increased pain. Please refer to below assist levels for adl completion. Pt ed on OT POC, safety awareness tech, proper hand placement for transfers, and energy conservation tech with proper breathing tech, anterior hip replacement precautions with fair return. Pt retired supine in bed with call light and phone in reach, bed alarm activated. All needs met upon exit. Performance deficits / Impairments: Decreased functional mobility ; Decreased safe awareness;Decreased balance;Decreased ADL status; Decreased posture;Decreased endurance;Decreased high-level IADLs  Treatment Diagnosis: Closed Fracture R Femoral Neck-S/P RTHA  Prognosis: Good  Decision Making: Medium Complexity  OT Education: OT Role;Plan of Care;Precautions; ADL Adaptive Strategies;IADL Safety  REQUIRES OT FOLLOW UP: Yes  Activity Tolerance  Activity Tolerance:  (limited by increased nausea)  Safety Devices  Safety Devices in place: Yes  Type of devices: Bed alarm in place;Call light within reach; Left in bed  Restraints  Initially in place: No         Patient Diagnosis(es): The primary encounter diagnosis was Closed fracture of neck of right femur, initial encounter ([address] A diagnosis of Fall from standing, initial encounter was also pertinent to this visit. has a past medical history of Migraines and Seizures ([address] has a past surgical history that includes shoulder surgery (Left, ); Mandible fracture surgery;   section; Hysterectomy, total abdominal; Total knee arthroplasty (Right, 12/12/2019); Total knee arthroplasty (Left, 07/13/2017); Cholecystectomy, laparoscopic (2005); and Hip Arthroplasty (Right, 02/14/2022). Treatment Diagnosis: Closed Fracture R Femoral Neck-S/P RTHA    Restrictions  Restrictions/Precautions  Restrictions/Precautions: Surgical Protocols,Fall Risk,Up as Tolerated,Weight Bearing  Required Braces or Orthoses?: No  Lower Extremity Weight Bearing Restrictions  Right Lower Extremity Weight Bearing: Weight Bearing As Tolerated  Position Activity Restriction  Hip Precautions: No hip flexion > 90 degrees,No active ABduction,No ADduction,No hip internal rotation  Other position/activity restrictions: Right hip anterior precautions. Avoid excessive extension, external rotation, and abduction maneuvers. Avoid crossing over of legs. Subjective   General  Patient assessed for rehabilitation services?: Yes  Family / Caregiver Present: No  Patient Currently in Pain: Yes  Pain Assessment  Pain Assessment: 0-10  Pain Level: 9  Patient's Stated Pain Goal: No pain  Pain Type: Acute pain;Surgical pain  Pain Location: Hip;Rib cage  Pain Orientation: Right  Pain Descriptors: Edman Ximena; Shooting  Pain Frequency: Continuous  Pain Onset: On-going  Clinical Progression: Rapidly worsening (with mobility)  Functional Pain Assessment: Prevents or interferes some active activities and ADLs  Non-Pharmaceutical Pain Intervention(s): Repositioned; Ambulation/Increased Activity; Therapeutic presence;Distraction;Cold applied  Vital Signs  Patient Currently in Pain: Yes  Oxygen Therapy  O2 Device: None (Room air)  Social/Functional History  Social/Functional History  Lives With: Daughter  Type of Home: House  Home Layout: Two level,Able to Live on Main level with bedroom/bathroom,Laundry in basement (rail on L for basement)  Home Access: Stairs to enter without rails  Entrance Stairs - Number of Steps: 2 steps to enter  Bathroom Shower/Tub: Tub/Shower unit,Curtain  Bathroom Toilet: Standard  Bathroom Equipment: Grab bars around toilet (L toilet rail)  Home Equipment: Marcial Schafer (pt not using any DME prior to admission)  Receives Help From: Family (pt has a supportive dtr but is gone a lot working, not home very much. Dtr will be moving out in ~2 weeks. pt has a son with disabilities, not able to help much. All neighbors work, no other family)  ADL Assistance: Independent  Homemaking Assistance: Independent  Homemaking Responsibilities: Yes  Meal Prep Responsibility: Secondary (dtr completes majority of meals)  Laundry Responsibility: Secondary (shared with dtr)  Cleaning Responsibility: Secondary (shared with dtr)  Bill Paying/Finance Responsibility: Primary  Shopping Responsibility: Secondary (dtr completes majority. 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 Pt given zofran per RN. Agreeable to complete transfers tomorrow am  Functional Mobility  Functional Mobility Comments: Standing not completed this pm as pt just finished with PT and has severe R hip pain along with increased nausea. Pt given zofran per RN.  Agreeable to complete transfers tomorrow am  ADL  Feeding: Independent;Setup (pt currently nauseated-RN provided Zofran)  Grooming: Independent;Setup  UE Bathing: Independent;Setup  LE Bathing: Moderate assistance;Setup; Increased time to complete  UE Dressing: Minimal assistance;Setup (pt able to thread BUE's through arm holes of gown, assist to tie gown in back)  LE Dressing: Moderate assistance;Setup; Increased time to complete  Toileting: Moderate assistance;Setup  Tone RUE  RUE Tone: Normotonic  Tone LUE  LUE Tone: Normotonic  Coordination  Movements Are Fluid And Coordinated: Yes     Bed mobility  Rolling to Left: Moderate assistance  Supine to Sit: Moderate assistance  Sit to Supine: Maximum assistance (assist with RLE progression)  Scooting: Moderate assistance  Transfers  Transfer Comments: Standing not completed this pm as pt just finished with PT and has severe R hip pain along with increased nausea. Pt given zofran per RN.  Agreeable to complete transfers tomorrow am     Cognition  Overall Cognitive Status: WFL     Sensation  Overall Sensation Status: WFL (denies numbness/tingling B hands)      LUE PROM (degrees)  LUE PROM: WFL  LUE AROM (degrees)  LUE AROM : WFL  Left Hand PROM (degrees)  Left Hand PROM: WFL  Left Hand AROM (degrees)  Left Hand AROM: WFL  RUE PROM (degrees)  RUE PROM: WFL  RUE AROM (degrees)  RUE AROM : WFL  Right Hand PROM (degrees)  Right Hand PROM: WFL  Right Hand AROM (degrees)  Right Hand AROM: WFL  LUE Strength  Gross LUE Strength: WFL  L Hand General: 4/5  LUE Strength Comment: 4/5 overall muscle strength  RUE Strength  Gross RUE Strength: WFL  R Hand General: 4/5  RUE Strength Comment: 4/5 overall muscle strength      Plan   Plan  Times per week: 5-6 x week**R DENIZ**    AM-PAC Score  AM-PAC Inpatient Daily Activity Raw Score: 18 (02/15/22 1502)  AM-PAC Inpatient ADL T-Scale Score : 38.66 (02/15/22 1502)  ADL Inpatient CMS 0-100% Score: 46.65 (02/15/22 1502)  ADL Inpatient CMS G-Code Modifier : CK (02/15/22 1502)    Goals  Short term goals  Time Frame for Short term goals: Pt will, by discharge:  Short term goal 1: Dem I with bed mobility for daily tasks  Short term goal 2: Dem min a for adl performance with use of DME  Short term goal 3: Dem sba for functional transfers for daily occupations  Short term goal 4: Dem sba for dynamic mobility with LRD for household distance  Short term goal 5: Dem good safety awareness tech while utilizing/recalling anterior hip precautions  Short term goal 6: Dem a 10 min dynamic task with SBA to increase I with IADLS       Therapy Time   Individual Concurrent Group Co-treatment   Time In 1315         Time Out 1359         Minutes 44         Timed Code Treatment Minutes: [id number], OTR/L

## 2022-02-15 NOTE — PROGRESS NOTES
Physician Progress Note      Mara Russell  CSN #:                  [id number]  :                       1965  ADMIT DATE:       2022 5:57 PM  [address] [place] DATE:  RESPONDING  PROVIDER #:        Faviola Conner CNP          QUERY TEXT:    Pt admitted with Rt hip Fracture s/p fal and noted  to have Hx   Osteoporosis/Osteopenia. If possible, please document if you are evaluating   and/or treating any of the following: The medical record reflects the following:  Risk Factors: Suspected Rheumatoid arthritis,Hx Osteoporosis  Clinical Indicators: s/p trip and fall resulting in Rt femoral neck fracture   requiring total hip arthroplasty. Stated Hx Osteoporosis and Rheumatoid   arthritis with hx bilat TKA. CTA shows Osteopenic bones. Vit D level-47  Treatment: Rt hip total arthroplasty per Ortho. Options provided:  -- Pathological Rt hip fracture due to osteopenia following fall which would   not usually break a normal, healthy bone  -- Osteoporotic Rt hip fracture following fall which would not usually break a   normal, healthy bone  -- Traumatic Rt hip fracture  -- Other - I will add my own diagnosis  -- Disagree - Not applicable / Not valid  -- Disagree - Clinically unable to determine / Unknown  -- Refer to Clinical Documentation Reviewer    PROVIDER RESPONSE TEXT:    This patient has a traumatic Rt hip fracture.     Query created by: Pepito Sales on 2/15/2022 7:36 AM      Electronically signed by:  Faviola Conner CNP 2/15/2022 8:11 AM

## 2022-02-15 NOTE — PROGRESS NOTES
Pharmacy Note     Enoxaparin Dose Adjustment    Sagar Nur is a 61 y.o. female. Pharmacist assessment of enoxaparin dose for VTE prophylaxis. Recent Labs     02/14/22  0548 02/15/22  0427   BUN 6 8       Recent Labs     02/14/22  0548 02/15/22  0427   CREATININE 0.35* 0.41*       Estimated CrCl using Ideal Body Weight: 44 mL/min     Height:   Ht Readings from Last 1 Encounters:   02/14/22 5' (1.524 m)     Weight:  Wt Readings from Last 1 Encounters:   02/14/22 98 lb (44.5 kg)       The following enoxaparin dose has been adjusted based upon renal function and/or patient weight per P&T Guidelines:    Enoxaparin 30mg SC BID changed to Enoxaparin 30mg SC daily. Thank you,  Kenzie Napoles, [facility]  2/15/2022  8:00 AM

## 2022-02-15 NOTE — PROGRESS NOTES
PROGRESS NOTE    PATIENT NAME: Maddy Beck  MEDICAL RECORD NO. [id number]  DATE: 2/15/2022  PRIMARY CARE PHYSICIAN: Eilleen Saint, MD    HD: # 1    ASSESSMENT    Patient Active Problem List   Diagnosis    Migraine without aura and without status migrainosus, not intractable    Generalized anxiety disorder    Chronic bilateral low back pain without sciatica    Primary osteoarthritis involving multiple joints    Seizure disorder (HCC)    Vestibular hypofunction of both ears    S/P total knee arthroplasty    Migraine    Familial hypercholesterolemia    Chronic vertigo    Anxiety    Closed fracture of neck of right femur [facility])       MEDICAL DECISION MAKING AND PLAN    Fall    R Femoral Neck Fracture   -s/p R hip arthroplasty ()   -PT/OT   -WBAT RLE    -DVT ppx: lovenox   -F/u Dr. Candice Duval      Home phenobarb resumed for seizures  Urinary retention preop, reid placed. Remove reid today and bladder scan if needed  Reg diet       SUBJECTIVE    Maddy Beck is seen and examined. Afebrile, VSS. Pain is controlled this morning. Motor and sensation intact to extremities. Tolerated dinner.        OBJECTIVE  VITALS: Temp: Temp: 99 °F (37.2 °C)Temp  Av.5 °F (35.8 °C)  Min: 95.4 °F (35.2 °C)  Max: 99 °F (77.7 °C) BP Systolic (06RYN), KSU:998 , Min:77 , IHQ:388   Diastolic (02ZIO), QBE:23, Min:58, Max:120   Pulse Pulse  Av  Min: 63  Max: 101 Resp Resp  Av.7  Min: 0  Max: 20 Pulse ox SpO2  Av.3 %  Min: 78 %  Max: 100 %  GENERAL: alert, no distress  NEURO: CNII-XII grossly intact, no focal neurological deficits    HEENT: atraumatic, normocephalic, sclera sclear, nose without deformity, trachea midline   LUNGS: normal effort on RA, no respiratory distress, no accessory muscle use   HEART: regular rate and rhythm   ABDOMEN: soft, nontender, nondistended  EXTREMITY: R hip incision dressing CDI, motor intact to bilateral upper and lower extremities, palpable dp pulses    I/O last 3 completed shifts: In: 1200 [I.V.:1200]  Out: 2085 [Urine:1685; Blood:400]    Drain/tube output: In: 1200 [I.V.:1200]  Out: 785 [Urine:385]    LAB:  CBC:   Recent Labs     02/13/22 1914 02/14/22  0548 02/15/22  0427   WBC 6.3 6.9 6.6   HGB 14.8 13.1 10.1*   HCT 44.8 40.6 31.0*   MCV 88.5 88.6 90.1    262 210     BMP:   Recent Labs     02/13/22 1914 02/14/22 0548 02/15/22  0427    138 136   K 3.9 3.9 3.7    106 104   CO2 24 25 25   BUN 8 6 8   CREATININE 0.44* 0.35* 0.41*   GLUCOSE 101* 105* 102*     COAGS:   Recent Labs     02/14/22  0041   APTT 23.7   INR 1.0       RADIOLOGY:  No new imaging    Charmaine Moctezuma DO       Attending Note      I have reviewed the above [facility] note(s) and I either performed the key elements of the medical history and physical exam or was present with the trauma resident when the key elements of the medical history and physical exam were performed. I have discussed the findings, established the care plan and recommendations with the trauma team.  Tearful due to circumstances. PT to tk.  YAKELIN planning.     Reebcca Newsome MD  2/15/2022  8:14 AM

## 2022-02-15 NOTE — PROGRESS NOTES
Physical Therapy    Facility/Department: [address] BURN UNIT  Initial Assessment    NAME: Amie Manriquez  : 1965  MRN: [id number]    Date of Service: 2/15/2022   Pt fell at home, suffering a R complete, displaced femoral neck fracture and     Discharge Recommendations:  Further therapy recommended at discharge. PT Equipment Recommendations  Equipment Needed: No (pt has equipment from previous surgeries, per pt)    Assessment    Pt cooperative in spite of severe pain with mobility; pt became nauseous upon sitting at the EOB, but it subsided as she sat. Decreased ability to advance RLE, occasionally using her toes to \"crawl\" her leg forward. She lives alone and requires mod to max A to get into and out of bed and was only able to tolerate taking ~8 steps forward and back. Pt would benefit from short term rehab prior to returning home alone. Body structures, Functions, Activity limitations: Decreased functional mobility ; Decreased ROM; Decreased strength;Decreased balance; Increased pain;Decreased posture  Prognosis: Good  Decision Making: Medium Complexity  PT Education: Goals;PT Role;Plan of Care;Home Exercise Program;Precautions;Transfer Training;Energy Conservation;General Safety;Weight-bearing Education;Gait Training;Disease Specific Education; Functional Mobility Training; Injury Prevention  REQUIRES PT FOLLOW UP: Yes  Activity Tolerance  Activity Tolerance: Patient limited by pain       Patient Diagnosis(es): The primary encounter diagnosis was Closed fracture of neck of right femur, initial encounter ([address] A diagnosis of Fall from standing, initial encounter was also pertinent to this visit. has a past medical history of Migraines and Seizures ([address] has a past surgical history that includes shoulder surgery (Left, ); Mandible fracture surgery;  section; Hysterectomy, total abdominal; Total knee arthroplasty (Right, 2019);  Total knee arthroplasty (Left, 2017); Cholecystectomy, laparoscopic (2005); and Hip Arthroplasty (Right, 02/14/2022). Restrictions  Restrictions/Precautions  Restrictions/Precautions: General Precautions,Surgical Protocols,Fall Risk,Up as Tolerated  Required Braces or Orthoses?: No  Position Activity Restriction  Hip Precautions: No hip flexion > 90 degrees,No active ABduction,No ADduction,No hip internal rotation  Vision/Hearing  Vision: Within Functional Limits  Hearing: Within functional limits     Subjective  General  Patient assessed for rehabilitation services?: Yes  Response To Previous Treatment: Not applicable  Family / Caregiver Present: No  Follows Commands: Within Functional Limits  Pain Screening  Patient Currently in Pain: Yes  Pain Assessment  Pain Assessment: 0-10  Pain Level: 5 (9/10 after mobilizing)  Patient's Stated Pain Goal: No pain  Pain Type: Surgical pain;Acute pain  Pain Location: Hip;Rib cage  Pain Orientation: Right  Pain Descriptors: Avani Saras; Shooting  Pain Frequency: Continuous  Pain Onset: On-going  Clinical Progression: Rapidly worsening (with mobility)  Functional Pain Assessment: Prevents or interferes some active activities and ADLs  Vital Signs  Patient Currently in Pain: Yes  Pre Treatment Pain Screening  Comments / Details:  (pt nauseous after mobilizing--RN brought meds for nausea)    Orientation  Orientation  Overall Orientation Status: Within Normal Limits  Social/Functional History  Social/Functional History  Lives With: Alone  Type of Home: House  Home Layout: Two level,Able to Live on Main level with bedroom/bathroom  Home Access: Stairs to enter without rails  Entrance Stairs - Number of Steps: 2  Home Equipment: Rolling walker  ADL Assistance: Independent  Ambulation Assistance: Independent  Transfer Assistance: Independent  Active : Yes  Occupation: Full time employment    Objective     Observation/Palpation  Posture: Fair    PROM RLE (degrees)  RLE PROM: Exceptions--hip flexion (AAROM) to ~45 degrees and painful; knee to 90 degrees sitting EOB, lacks ~15 degrees from full knee extension; ankle WFL  AROM RLE (degrees)  RLE AROM: WFL  AROM LLE (degrees)  LLE AROM : WFL  AROM RUE (degrees)  RUE AROM : WFL  Strength RLE  Strength RLE: Exception--hip grossly 2/5; knee 3/5; ankle 5/5  Strength LLE  Strength LLE: WFL  Strength RUE  Strength RUE: WFL  Strength LUE  Strength LUE: WFL  Tone RLE  RLE Tone: Normotonic  Tone LLE  LLE Tone: Normotonic  Sensation  Overall Sensation Status: WFL (denies N/T)  Bed mobility  Supine to Sit: Moderate assistance  Sit to Supine: Maximum assistance (assist for the RLE)  Scooting: Minimal assistance; Moderate assistance  Transfers  Sit to Stand: Minimal Assistance (from elevated bed d/t severe R hip pain)  Stand to sit: Minimal Assistance  Ambulation  Ambulation?: Yes  Ambulation 1  Surface: level tile  Device: Rolling Walker  Assistance: Minimal assistance  Gait Deviations: Slow Karen;Decreased step length;Decreased step height;Decreased arm swing;Decreased head and trunk rotation  Distance: ~8 steps forward and 8 steps retro, frequent verbal cues for sequencing  Stairs/Curb  Stairs?: No     Balance  Posture: Fair  Sitting - Static: Fair (pt leans away from her R hip in sitting)  Sitting - Dynamic: Fair  Standing - Static: Fair  Standing - Dynamic: Fair (decreased wt bearing RLE in standing)  Exercises  Hamstring Sets: x10  Quad Sets: x10  Gluteal Sets: x10  Hip Flexion: 5 reps R AAROM, 10 reps L AROM  Knee Short Arc Quad: x10 RLE  Ankle Pumps: x10 BLEs     Plan   Plan  Times per week: BID  Times per day: Twice a day  Current Treatment Recommendations: Strengthening,ROM,Balance Training,Functional Mobility Training,Transfer Training,Endurance Training,Gait Training,Stair training,Pain [employer] Exercise Program,Safety Education & Training,Patient/Caregiver Education & Training,Equipment Evaluation, Education, & procurement,Positioning  Safety Devices  Type of devices: Call light within reach,Gait belt,Patient at risk for falls,Left in bed,Nurse notified  Restraints  Initially in place: No    AM-PAC Score  AM-PAC Inpatient Mobility Raw Score : 11 (02/15/22 1347)  AM-PAC Inpatient T-Scale Score : 33.86 (02/15/22 1347)  Mobility Inpatient CMS 0-100% Score: 72.57 (02/15/22 1347)  Mobility Inpatient CMS G-Code Modifier : CL (02/15/22 1347)          Goals  Short term goals  Time Frame for Short term goals: 14 visits  Short term goal 1: independent bed mobility  Short term goal 2: independent transfers  Short term goal 3: independent gait with rwalker x 50'  Short term goal 4: stair ambulation x 2 steps without HR, min A+1  Short term goal 5: independent with HEP for DENIZ protocol and precautions  Patient Goals   Patient goals : decrease pain, be able to walk better       Therapy Time   Individual Concurrent Group Co-treatment   Time In 1240         Time Out 1320         Minutes 40                 Ofe Yi, PT

## 2022-02-15 NOTE — PLAN OF CARE
Problem: Skin Integrity:  Goal: Will show no infection signs and symptoms  Description: Will show no infection signs and symptoms  2/15/2022 0026 by Marion South RN  Outcome: Ongoing  2/14/2022 1834 by Itz Jensen RN  Outcome: Ongoing  Goal: Absence of new skin breakdown  Description: Absence of new skin breakdown  2/15/2022 0026 by Marion South RN  Outcome: Ongoing  2/14/2022 1834 by Itz Jensen RN  Outcome: Ongoing     Problem: Falls - Risk of:  Goal: Will remain free from falls  Description: Will remain free from falls  2/15/2022 0026 by Marion South RN  Outcome: Ongoing  2/14/2022 1834 by Itz Jensen RN  Outcome: Ongoing  Goal: Absence of physical injury  Description: Absence of physical injury  2/15/2022 0026 by Marion South RN  Outcome: Ongoing  2/14/2022 1834 by Itz Jensen RN  Outcome: Ongoing     Problem: Pain:  Goal: Pain level will decrease  Description: Pain level will decrease  2/15/2022 0026 by Marion South RN  Outcome: Ongoing  2/14/2022 1834 by Itz Jensen RN  Outcome: Ongoing  Goal: Control of acute pain  Description: Control of acute pain  2/15/2022 0026 by Marion South RN  Outcome: Ongoing  2/14/2022 1834 by Itz Jensen RN  Outcome: Ongoing  Goal: Control of chronic pain  Description: Control of chronic pain  2/15/2022 0026 by Marion South RN  Outcome: Ongoing  2/14/2022 1834 by Itz Jensen RN  Outcome: Ongoing

## 2022-02-15 NOTE — PROGRESS NOTES
Orthopedic Progress Note    Patient:  Sagar Nur  YOB: 1965     61 y.o. female    Subjective:  Patient seen and examined  No complaints or concerns  No issue overnight per patient and nursing  Pain controlled  Denies fever, HA, CP, SOB, N/V, dysuria  Denies BM, positive flatus, does state baseline constipation  200 out of reid per nursing. Patient does state she has some urinary retention at baseline and has had to have a catheter on occasion that usually gets her being able to urinate  Did not work with PT yesterday    [employer] reviewed    Objective:   Vitals:    02/15/22 0500   BP:    Pulse: 94   Resp:    Temp:    SpO2:        Gen: NAD, cooperative     Cardiovascular: Regular rate    Respiratory: Chest symmetric, no accessory muscle use, normal respirations, no audible wheezes    MSK: RLE: Optifaom dressing on, c/d/i. No sign of strike through bleeding. Appropriate tenderness about the hip and thigh. Leg lengths appear equal. Compartments soft and compressible. EHL/FHL/TA/GSC motor intact. Sensation intact to sural/saph/SPN/DPN distribution. DP/PT pulses 2+. Recent Labs     02/14/22  0041 02/14/22  0548 02/15/22  0427   WBC  --    < > 6.6   HGB  --    < > 10.1*   HCT  --    < > 31.0*   PLT  --    < > 210   INR 1.0  --   --    NA  --    < > 136   K  --    < > 3.7   BUN  --    < > 8   CREATININE  --    < > 0.41*   GLUCOSE  --    < > 102*    < > = values in this interval not displayed. Antibiotics: Ancef      See rec for complete list    Impression/plan: 61 y.o. female who [address], being seen for:    -Right femoral neck fracture s/p DENIZ POD#1    -Optifoam dressings on. Please maintain. Orthopedics to manage.   -WB status: WBAT RLE  -Pain control per primary team.  Recommend multimodal pain management  -DVT ppx: Ok for chemical AC POD#1  -Complete post op antibiotics with Ancef 2 g Q8h x 2 doses  -Recommend discontinuing reid  -Encourage Ice for pain control  -Encourage Incentive Spirometry use  -PT/OT evaluate and treat for mobilization  -Ok to discharge from orthopedic perspective. Patient does live at home without help for ADL's.  May require short term aid.   -F/u with Dr. OL[facility] outpatient on 2/28  -Please page ortho with any questions      Milagros Aguirre DO   Orthopedic Surgery Resident PGY-1  [facility]

## 2022-02-15 NOTE — DISCHARGE INSTR - COC
Continuity of Care Form    Patient Name: Antonietta Medeiros   :  1965  MRN:  [id number]    Admit date:  2022  Discharge date:  22    Code Status Order: Full Code   Advance Directives:   [facility] Documentation       Date/Time Healthcare Directive Type of Healthcare Directive Copy in [facility] [address] Agent's Name Healthcare Agent's Phone Number    22 0739 No, patient does not have an advance directive for healthcare treatment -- -- -- -- --            Admitting Physician:  Jose Cantor DO  PCP: Wilmar Shetty MD    Discharging Nurse: [address] [place] [address] Unit/Room#: 8952/7660-00  Discharging Unit Phone Number: [phone]    Emergency Contact:   Extended Emergency Contact Information  Primary Emergency Contact: [address] [place] [address] Phone: [phone]  Relation: Child  Secondary Emergency Contact: [address] [place] [address] Phone: [phone]  Relation: Child    Past Surgical History:  Past Surgical History:   Procedure Laterality Date     SECTION      CHOLECYSTECTOMY, LAPAROSCOPIC  2005    HIP ARTHROPLASTY Right 2022    TOTAL HIP ARTHROPLASTY  BEACH & NEPHEW (Right )    HYSTERECTOMY, TOTAL ABDOMINAL      MANDIBLE FRACTURE SURGERY      SHOULDER SURGERY Left     TOTAL KNEE ARTHROPLASTY Right 2019    TOTAL KNEE ARTHROPLASTY Left 2017       Immunization History:   Immunization History   Administered Date(s) Administered    COVID-19, Pfizer Purple top, DILUTE for use, 12+ yrs, 30mcg/0.3mL dose 2021, 2022    Influenza Vaccine, unspecified formulation 2012, 2014    Influenza Virus Vaccine 2012, 2014       Active Problems:  Patient Active Problem List   Diagnosis Code    Migraine without aura and without status migrainosus, not intractable G43.009    Generalized anxiety disorder F41.1    Chronic bilateral low back pain without sciatica M54.50, G89.29 - No ventilator support    Rehab Therapies: Physical Therapy and Occupational Therapy  Weight Bearing Status/Restrictions: No weight bearing restirctions  Other Medical Equipment (for information only, NOT a DME order):  walker  Other Treatments: ***    Patient's personal belongings (please select all that are sent with patient): All belongings with patient. RN SIGNATURE:  Electronically signed by Chelo Leger RN on 2/18/22 at 8:48 AM EST    CASE MANAGEMENT/SOCIAL WORK SECTION    Inpatient Status Date: ***    Readmission Risk Assessment Score:  Readmission Risk              Risk of Unplanned Readmission:  7           Discharging to Facility/ Agency   Name: [facility] acute rehab  [facility] [address]  Phone:[phone]  Fax:    Dialysis Facility (if applicable)   Name:      / signature: Electronically signed by Carline Olivares RN on 2/18/22 at 8:29 AM EST    PHYSICIAN SECTION    Prognosis: Good    Condition at Discharge: Stable    Rehab Potential (if transferring to Rehab): Good    Recommended Labs or Other Treatments After Discharge: ***    Physician Certification: I certify the above information and transfer of Claus Choudhary  is necessary for the continuing treatment of the diagnosis listed and that she requires Acute Rehab for less 30 days.      Update Admission H&P: No change in H&P    PHYSICIAN SIGNATURE:  Electronically signed by ZUHAIR Castañeda CNP on 2/17/22 at 11:05 AM EST No

## 2022-02-15 NOTE — OP NOTE
[address]                  [facility], [address]                                OPERATIVE REPORT    PATIENT NAME: Rose Massey                    :        1965  MED REC NO:   [id number]                             ROOM:       0164  ACCOUNT NO:   [de-identified]                           ADMIT DATE: 2022  PROVIDER:     Madhu Holliday    DATE OF PROCEDURE:  2022    PREOPERATIVE DIAGNOSIS:  Right femoral neck fracture. POSTOPERATIVE DIAGNOSIS:  Right femoral neck fracture. PROCEDURE:  Right total hip arthroplasty, CPT 74529. ATTENDING SURGEON:  Madhu Holliday DO    ASSESSMENT:  1. Neela Velasco, PGY-5  2. Greta Jackman, PGY-3  2. Lynda Gale, PGY-2    ANESTHESIA:  Spinal.    ESTIMATED BLOOD LOSS:  200 mL. IV FLUIDS:  1200 mL of crystalloid. COMPLICATIONS:  None. SPECIMENS:  None. IMPLANTS:  Rivero [employer] Nephew 52-mm R3 cup size 6 Anthology standard  offset stem and a 36-mm +4 Oxinium head. INDICATIONS:  The patient is a  60-year-old female who sustained a  complete and displaced right femoral neck fracture as result of a  ground-level fall at her home. She normally ambulates independently and  is active and very functional.  She has suspected rheumatoid arthritis,  but never been formally diagnosed and reported mild-to-moderate  preexisting right hip pain. Diagnosed of osteoporosis and a  well-controlled seizure disorder. We discussed the risks, benefits, and  alternatives of open reduction internal fixation versus hemiarthroplasty  versus total arthroplasty. After very careful consideration, we agreed  that total hip arthroplasty best met her needs based on her health and  functional status. The written informed consent was obtained and her  operative site was marked. OPERATIVE PROCEDURE:  The patient was transported to the operating room.   Spinal anesthesia was performed by the anesthesia providers without  complication. She was transferred to a [place] table in a supine position  with both legs secured in the spars with a perineal post.  The right arm  was secured over chest.  All bony prominences well padded. Preoperative  imaging of an adequate AP pelvis and AP left hip was obtained for  intraoperative templating. We then prescrubbed the left lower extremity  and then prepped and draped in the usual sterile fashion. A time-out  was performed that included all involved parties in correctly  identifying the patient, planned procedure and operative site. After  everyone agreed, we did continue. We then completed a standard direct  anterior approach for a total hip arthroplasty without complication. Aquamantys was used for achieving hemostasis and the patient did receive 1 gm  of TXA at incision and closure. A capsulotomy was performed. Next, a  femoral neck osteotomy was performed based on the preoperative  templating as well as a napkin ring cut for easy retraction. The labrum  and excessive capsular tissue around the acetabulum were excised. This  reamer was a 46-mm reamer in order to appropriately medialize. We then  upsized sequentially to a 51-mm reamer and set desired version and  inclination based on radiographs. Once we were satisfied, we then press  fit a Smith and Nephew 52-mm R3 acetabular component, was noted to have  excellent press-fit and screw augmentation was not felt to be necessary. We then inserted a neutral polyethylene liner. We then placed  retractors and placed the femoral shaft in a maximally extended and  externally rotated position with slight adduction. Disposition along  with appropriate capsular release and careful retractor placement  delivered the femur appropriately into the wound.   We then started with  our box chisel followed by the canal finder and then sequentially  broached up to a size 6 stem, which had excellent purchase or had an  excellent press-fit and again matched our preoperative templating. We  found no evidence of intraoperative fracture. A trialing was completed  and we felt that a 36-mm head with a +4 mm offset gave us the most ideal  and stable environment without over tensioning the soft tissues. We  then removed the trial implants, irrigated the wound with Irrisept and  flushed with normal saline after the appropriate time had passed. We  then inserted a Smith and Nephew size 6 standard offset Anthology  press-fit femoral stem followed by a 36 mm +4 Oxinium femoral head. We  then reduced the hip, obtained final images in the AP and lateral  projections of the hip which showed appropriate overall limb alignment  and safe implant placement with no identification of iatrogenic  fracture. We again copiously irrigated the wound. We placed 1 gm of  vancomycin powder deep in the wound to aide in infection prophylaxis. We then closed the overlying TFL fascia and its subcutaneous tissues  with the skin and approximated with a 3-0 Monocryl in a running  subcuticular fashion. The skin was cleaned and dried and the incision  site sealed with Dermabond glue. Sterile occlusive dressing was  applied. The patient was transferred to the recovery room by the  anesthesia providers without complication. POSTOPERATIVE PLAN:  The patient will begin weightbearing as tolerated  immediately postoperatively. She will receive AP pelvis and right hip  radiographs in the recovery room. She will receive 23 hours of  prophylactic antibiotics and may begin DVT prophylaxis on postoperative  day one if felt to be appropriate by the Primary Service. Following  discharge from the hospital, she will be evaluated in the [facility] in approximately 10-14 days for this procedure for a wound  check.         Jah Riggs    D: 02/14/2022 16:13:49       T: 02/14/2022 16:18:50     [email or web address]  Job#: [id number]     Doc#: [id number]    CC:

## 2022-02-16 LAB
HCT VFR BLD CALC: 26.9 % (ref 36.3–47.1)
HEMOGLOBIN: 9 G/DL (ref 11.9–15.1)

## 2022-02-16 PROCEDURE — 6370000000 HC RX 637 (ALT 250 FOR IP): Performed by: STUDENT IN AN ORGANIZED HEALTH CARE EDUCATION/TRAINING PROGRAM

## 2022-02-16 PROCEDURE — 51798 US URINE CAPACITY MEASURE: CPT

## 2022-02-16 PROCEDURE — 97116 GAIT TRAINING THERAPY: CPT

## 2022-02-16 PROCEDURE — 85014 HEMATOCRIT: CPT

## 2022-02-16 PROCEDURE — 6360000002 HC RX W HCPCS: Performed by: STUDENT IN AN ORGANIZED HEALTH CARE EDUCATION/TRAINING PROGRAM

## 2022-02-16 PROCEDURE — 97535 SELF CARE MNGMENT TRAINING: CPT

## 2022-02-16 PROCEDURE — 97110 THERAPEUTIC EXERCISES: CPT

## 2022-02-16 PROCEDURE — 97530 THERAPEUTIC ACTIVITIES: CPT

## 2022-02-16 PROCEDURE — 1200000000 HC SEMI PRIVATE

## 2022-02-16 PROCEDURE — 99254 IP/OBS CNSLTJ NEW/EST MOD 60: CPT | Performed by: STUDENT IN AN ORGANIZED HEALTH CARE EDUCATION/TRAINING PROGRAM

## 2022-02-16 PROCEDURE — 2580000003 HC RX 258: Performed by: STUDENT IN AN ORGANIZED HEALTH CARE EDUCATION/TRAINING PROGRAM

## 2022-02-16 PROCEDURE — 36415 COLL VENOUS BLD VENIPUNCTURE: CPT

## 2022-02-16 PROCEDURE — 6360000002 HC RX W HCPCS: Performed by: NURSE PRACTITIONER

## 2022-02-16 PROCEDURE — 85018 HEMOGLOBIN: CPT

## 2022-02-16 RX ORDER — POLYETHYLENE GLYCOL 3350 17 G/17G
17 POWDER, FOR SOLUTION ORAL DAILY
Qty: 527 G | Refills: 1 | OUTPATIENT
Start: 2022-02-16 | End: 2022-03-18

## 2022-02-16 RX ORDER — OXYCODONE HYDROCHLORIDE 5 MG/1
5 TABLET ORAL EVERY 8 HOURS PRN
Qty: 9 TABLET | Refills: 0 | Status: SHIPPED | OUTPATIENT
Start: 2022-02-16 | End: 2022-02-19

## 2022-02-16 RX ORDER — 0.9 % SODIUM CHLORIDE 0.9 %
500 INTRAVENOUS SOLUTION INTRAVENOUS ONCE
Status: COMPLETED | OUTPATIENT
Start: 2022-02-16 | End: 2022-02-16

## 2022-02-16 RX ORDER — ACETAMINOPHEN 500 MG
1000 TABLET ORAL EVERY 8 HOURS SCHEDULED
Status: CANCELLED | OUTPATIENT
Start: 2022-02-16

## 2022-02-16 RX ORDER — OXYCODONE HYDROCHLORIDE 5 MG/1
5 TABLET ORAL EVERY 4 HOURS PRN
Status: CANCELLED | OUTPATIENT
Start: 2022-02-16

## 2022-02-16 RX ORDER — OXYCODONE HYDROCHLORIDE 5 MG/1
5 TABLET ORAL EVERY 6 HOURS PRN
Qty: 20 TABLET | Refills: 0 | OUTPATIENT
Start: 2022-02-16 | End: 2022-02-21

## 2022-02-16 RX ORDER — POLYETHYLENE GLYCOL 3350 17 G/17G
17 POWDER, FOR SOLUTION ORAL DAILY
Status: CANCELLED | OUTPATIENT
Start: 2022-02-16

## 2022-02-16 RX ORDER — GABAPENTIN 600 MG/1
300 TABLET ORAL 3 TIMES DAILY
Status: CANCELLED | OUTPATIENT
Start: 2022-02-16

## 2022-02-16 RX ORDER — POLYETHYLENE GLYCOL 3350 17 G/17G
17 POWDER, FOR SOLUTION ORAL DAILY
Qty: 7 EACH | Refills: 0 | DISCHARGE
Start: 2022-02-17 | End: 2022-02-24

## 2022-02-16 RX ORDER — ALPRAZOLAM 1 MG/1
1 TABLET ORAL 2 TIMES DAILY PRN
Status: CANCELLED | OUTPATIENT
Start: 2022-02-16

## 2022-02-16 RX ORDER — ACETAMINOPHEN 500 MG
1000 TABLET ORAL EVERY 8 HOURS PRN
Qty: 60 TABLET | Refills: 0 | OUTPATIENT
Start: 2022-02-16 | End: 2022-02-26

## 2022-02-16 RX ORDER — PHENOBARBITAL 64.8 MG/1
64.8 TABLET ORAL 2 TIMES DAILY
Qty: 14 TABLET | Refills: 0 | Status: SHIPPED | OUTPATIENT
Start: 2022-02-16 | End: 2022-02-23

## 2022-02-16 RX ORDER — IBUPROFEN 400 MG/1
400 TABLET ORAL EVERY 6 HOURS
Status: CANCELLED | OUTPATIENT
Start: 2022-02-16

## 2022-02-16 RX ORDER — GABAPENTIN 600 MG/1
300 TABLET ORAL 3 TIMES DAILY
Qty: 11 TABLET | Refills: 0 | OUTPATIENT
Start: 2022-02-16 | End: 2022-02-23

## 2022-02-16 RX ORDER — PHENOBARBITAL 32.4 MG/1
64.8 TABLET ORAL 2 TIMES DAILY
COMMUNITY
End: 2022-08-09 | Stop reason: SDUPTHER

## 2022-02-16 RX ORDER — PHENOBARBITAL 64.8 MG/1
64.8 TABLET ORAL 2 TIMES DAILY
Status: CANCELLED | OUTPATIENT
Start: 2022-02-16

## 2022-02-16 RX ORDER — METHOCARBAMOL 750 MG/1
750 TABLET, FILM COATED ORAL EVERY 8 HOURS
Qty: 21 TABLET | Refills: 0 | OUTPATIENT
Start: 2022-02-16 | End: 2022-02-23

## 2022-02-16 RX ORDER — METHOCARBAMOL 750 MG/1
750 TABLET, FILM COATED ORAL EVERY 8 HOURS
Status: CANCELLED | OUTPATIENT
Start: 2022-02-16

## 2022-02-16 RX ADMIN — ACETAMINOPHEN 1000 MG: 500 TABLET ORAL at 22:36

## 2022-02-16 RX ADMIN — METHOCARBAMOL TABLETS 750 MG: 500 TABLET, COATED ORAL at 17:24

## 2022-02-16 RX ADMIN — IBUPROFEN 400 MG: 400 TABLET, FILM COATED ORAL at 17:25

## 2022-02-16 RX ADMIN — IBUPROFEN 400 MG: 400 TABLET, FILM COATED ORAL at 12:04

## 2022-02-16 RX ADMIN — SODIUM CHLORIDE 500 ML: 9 INJECTION, SOLUTION INTRAVENOUS at 19:57

## 2022-02-16 RX ADMIN — GABAPENTIN 300 MG: 600 TABLET ORAL at 20:00

## 2022-02-16 RX ADMIN — OXYCODONE 5 MG: 5 TABLET ORAL at 20:00

## 2022-02-16 RX ADMIN — GABAPENTIN 300 MG: 600 TABLET ORAL at 14:48

## 2022-02-16 RX ADMIN — OXYCODONE 5 MG: 5 TABLET ORAL at 11:05

## 2022-02-16 RX ADMIN — PHENOBARBITAL 64.8 MG: 64.8 TABLET ORAL at 12:02

## 2022-02-16 RX ADMIN — POLYETHYLENE GLYCOL 3350 17 G: 17 POWDER, FOR SOLUTION ORAL at 09:29

## 2022-02-16 RX ADMIN — IBUPROFEN 400 MG: 400 TABLET, FILM COATED ORAL at 22:36

## 2022-02-16 RX ADMIN — IBUPROFEN 400 MG: 400 TABLET, FILM COATED ORAL at 05:56

## 2022-02-16 RX ADMIN — GABAPENTIN 300 MG: 600 TABLET ORAL at 09:27

## 2022-02-16 RX ADMIN — ACETAMINOPHEN 1000 MG: 500 TABLET ORAL at 14:48

## 2022-02-16 RX ADMIN — ENOXAPARIN SODIUM 30 MG: 100 INJECTION SUBCUTANEOUS at 09:28

## 2022-02-16 RX ADMIN — OXYCODONE 5 MG: 5 TABLET ORAL at 07:00

## 2022-02-16 RX ADMIN — ACETAMINOPHEN 1000 MG: 500 TABLET ORAL at 05:56

## 2022-02-16 RX ADMIN — METHOCARBAMOL TABLETS 750 MG: 500 TABLET, COATED ORAL at 09:28

## 2022-02-16 RX ADMIN — SODIUM CHLORIDE, PRESERVATIVE FREE 10 ML: 5 INJECTION INTRAVENOUS at 09:29

## 2022-02-16 RX ADMIN — METHOCARBAMOL TABLETS 750 MG: 500 TABLET, COATED ORAL at 01:33

## 2022-02-16 RX ADMIN — ENOXAPARIN SODIUM 30 MG: 100 INJECTION SUBCUTANEOUS at 13:12

## 2022-02-16 RX ADMIN — PHENOBARBITAL 64.8 MG: 64.8 TABLET ORAL at 20:14

## 2022-02-16 ASSESSMENT — PAIN SCALES - GENERAL
PAINLEVEL_OUTOF10: 4
PAINLEVEL_OUTOF10: 4
PAINLEVEL_OUTOF10: 8
PAINLEVEL_OUTOF10: 7
PAINLEVEL_OUTOF10: 2
PAINLEVEL_OUTOF10: 3
PAINLEVEL_OUTOF10: 5
PAINLEVEL_OUTOF10: 7
PAINLEVEL_OUTOF10: 4
PAINLEVEL_OUTOF10: 0
PAINLEVEL_OUTOF10: 2
PAINLEVEL_OUTOF10: 6
PAINLEVEL_OUTOF10: 4

## 2022-02-16 ASSESSMENT — PAIN DESCRIPTION - PAIN TYPE
TYPE: SURGICAL PAIN
TYPE: SURGICAL PAIN

## 2022-02-16 ASSESSMENT — ENCOUNTER SYMPTOMS
SHORTNESS OF BREATH: 0
BLURRED VISION: 0
CONSTIPATION: 1
ABDOMINAL PAIN: 0
DOUBLE VISION: 0

## 2022-02-16 ASSESSMENT — PAIN DESCRIPTION - ORIENTATION
ORIENTATION: RIGHT
ORIENTATION: RIGHT

## 2022-02-16 ASSESSMENT — PAIN DESCRIPTION - FREQUENCY: FREQUENCY: CONTINUOUS

## 2022-02-16 ASSESSMENT — PAIN DESCRIPTION - LOCATION
LOCATION: HIP
LOCATION: HIP

## 2022-02-16 ASSESSMENT — PAIN DESCRIPTION - PROGRESSION: CLINICAL_PROGRESSION: RAPIDLY WORSENING

## 2022-02-16 ASSESSMENT — PAIN - FUNCTIONAL ASSESSMENT: PAIN_FUNCTIONAL_ASSESSMENT: PREVENTS OR INTERFERES SOME ACTIVE ACTIVITIES AND ADLS

## 2022-02-16 ASSESSMENT — PAIN DESCRIPTION - ONSET: ONSET: ON-GOING

## 2022-02-16 ASSESSMENT — PAIN DESCRIPTION - DESCRIPTORS
DESCRIPTORS: DISCOMFORT;THROBBING
DESCRIPTORS: SHARP;SHOOTING

## 2022-02-16 NOTE — CONSULTS
Physical Medicine & Rehabilitation  Consult Note      Admitting Physician:  Cipriano Ruiz DO    Primary Care Provider:  Eilleen Saint, MD     Reason for Consult:  Acute Inpatient Rehabilitation    Chief Complaint:  Fall    History of Present Illness:  Referring Provider is requesting an evaluation for appropriate placement upon discharge from acute care. History from chart review and patient. Maddy Beck is a 61 y.o. female with history of seizures and migraines admitted to [facility] on 2/13/2022. She initially presented after a fall from standing at home. Denied hitting her head and LOC. Initial GCS 15. She was found to have a right femoral neck fracture and underwent right total hip arthroplasty on 2/14/22 (Dr. Candice Duval). She is WBAT to the right lower limb. Hospital course has been complicated by urinary retention requiring reid catheter and intermittent catheterization. She currently reports ongoing pain in the right hip as well as right rib pain. She also notes constipation. She feels like urination is improving. She denies any other acute concerns. Review of Systems:  Review of Systems   Constitutional: Negative for fever. HENT: Negative for hearing loss. Eyes: Negative for blurred vision and double vision. Respiratory: Negative for shortness of breath. Cardiovascular: Negative for chest pain. Gastrointestinal: Positive for constipation. Negative for abdominal pain. Genitourinary:        +urinary retention   Musculoskeletal: Positive for joint pain. Skin: Negative for rash. Neurological: Negative for sensory change and headaches.       Premorbid function:  Independent    Current function:    PT:  Restrictions/Precautions: Surgical Protocols,Fall Risk,Up as Tolerated,Weight Bearing  Hip Precautions: No hip flexion > 90 degrees,No active ABduction,No ADduction,No hip internal rotation,No hip external rotation  Other position/activity restrictions: Right hip anterior precautions. Avoid excessive extension, external rotation, and abduction maneuvers. Avoid crossing over of legs. Right Lower Extremity Weight Bearing: Weight Bearing As Tolerated   Transfers  Sit to Stand: Contact guard assistance  Stand to sit: Contact guard assistance  Bed to Chair: Contact guard assistance  Comment: Pt performed STS from bed with use of RW for UE support. Ambulation 1  Surface: level tile  Device: Rolling Walker  Assistance: Contact guard assistance  Quality of Gait: steady, no LOB noted  Gait Deviations: Slow Karen  Distance: 75ft x2  Comments: Pt amb from bed down the lagos to perform step ups. Pt demo good safety awareness and maintenance of walker positioning t/o. Transfers  Sit to Stand: Contact guard assistance  Stand to sit: Contact guard assistance  Bed to Chair: Contact guard assistance  Comment: Pt performed STS from bed with use of RW for UE support. Ambulation  Ambulation?: Yes  Ambulation 1  Surface: level tile  Device: Rolling Walker  Assistance: Contact guard assistance  Quality of Gait: steady, no LOB noted  Gait Deviations: Slow Karen  Distance: 75ft x2  Comments: Pt amb from bed down the lagos to perform step ups. Pt demo good safety awareness and maintenance of walker positioning t/o. Surface: level tile  Ambulation 1  Surface: level tile  Device: Rolling Walker  Assistance: Contact guard assistance  Quality of Gait: steady, no LOB noted  Gait Deviations: Slow Karen  Distance: 75ft x2  Comments: Pt amb from bed down the lagos to perform step ups. Pt demo good safety awareness and maintenance of walker positioning t/o.       OT:   ADL  Feeding: Independent,Setup (pt currently nauseated-RN provided Zofran)  Grooming: Setup,Stand by assistance,Increased time to complete (Pt washed face, brushed teeth, brushed hair)  UE Bathing: Setup,Increased time to complete,Minimal assistance (Pt washed UB, needed assist to wash back)  LE Bathing: Setup,Increased time to complete,Moderate assistance (Pt washed tops of LE, joesph area, L leg. Needed assist to wash R LE and feet d/t hip precautions and pain)  UE Dressing: Stand by assistance,Setup,Increased time to complete (Pt able to thread UE and tie gown)  LE Dressing: Moderate assistance,Setup,Increased time to complete (assist to don/doff footies)  Toileting: Setup,Increased time to complete,Contact guard assistance (Pt seated on toilet to wipe, stood to wash joesph area w/ CGA and RW)         Balance  Sitting Balance: Stand by assistance  Standing Balance: Contact guard assistance   Standing Balance  Time: Pt stood approx 5-6 mins  Activity: Pt stood to clean joesph area and toilet w/ use of RW  Comment: Standing not completed this pm as pt just finished with PT and has severe R hip pain along with increased nausea. Pt given zofran per RN. Agreeable to complete transfers tomorrow am  Functional Mobility  Functional - Mobility Device: Rolling Walker  Activity: To/from bathroom  Assist Level: Contact guard assistance  Functional Mobility Comments: Pt able to walk from recliner<bathroom and back with no SOB, some pain noted. Bed mobility  Rolling to Left: Moderate assistance  Supine to Sit: Stand by assistance  Sit to Supine: Unable to assess  Scooting: Stand by assistance  Comment: Pt in bed w/ HOB elevated upon arrival, retired to recliner at end of Tx. Min assist for RLE progression to EOB. Transfers  Stand Step Transfers: Contact guard assistance  Sit to stand: Contact guard assistance (use of RW + gait belt for stability and vc's for hand placement)  Stand to sit: Contact guard assistance (Use of RW and gait belt for stability, no SOB)  Transfer Comments: Standing not completed this pm as pt just finished with PT and has severe R hip pain along with increased nausea. Pt given zofran per RN.  Agreeable to complete transfers tomorrow am   Toilet Transfers  Toilet Transfer: Contact guard assistance  Toilet Transfers Comments: Vc's for hand placement             SLP:      Past Medical History:        Diagnosis Date    Migraines     Seizures [facility])        Past Surgical History:        Procedure Laterality Date     SECTION      CHOLECYSTECTOMY, LAPAROSCOPIC  2005    HIP ARTHROPLASTY Right 2022    TOTAL HIP ARTHROPLASTY  BEACH & NEPHEW (Right )    HIP SURGERY Right 2022    TOTAL HIP ARTHROPLASTY  BEACH & NEPHEW performed by Zara Arias DO at [address] [place] [address], TOTAL ABDOMINAL      MANDIBLE FRACTURE SURGERY      SHOULDER SURGERY Left     TOTAL KNEE ARTHROPLASTY Right 2019    TOTAL KNEE ARTHROPLASTY Left 2017       Allergies:     Allergies   Allergen Reactions    Carbamazepine Nausea Only and Nausea And Vomiting    Metoclopramide Other (See Comments)     Seizures      Phenytoin Sodium Extended Other (See Comments)     \"Causes seizure\"    Topiramate Nausea Only and Nausea And Vomiting     unknown    Verapamil Nausea Only and Nausea And Vomiting    Sulfamethoxazole-Trimethoprim Hives    Amoxicillin         Current Medications:   Current Facility-Administered Medications: enoxaparin (LOVENOX) injection 30 mg, 30 mg, SubCUTAneous, Daily  sodium chloride flush 0.9 % injection 5-40 mL, 5-40 mL, IntraVENous, PRN  polyethylene glycol (GLYCOLAX) packet 17 g, 17 g, Oral, Daily  acetaminophen (TYLENOL) tablet 1,000 mg, 1,000 mg, Oral, 3 times per day  ibuprofen (ADVIL;MOTRIN) tablet 400 mg, 400 mg, Oral, Q6H  gabapentin (NEURONTIN) tablet 300 mg, 300 mg, Oral, TID  methocarbamol (ROBAXIN) tablet 750 mg, 750 mg, Oral, Q8H  ondansetron (ZOFRAN) injection 4 mg, 4 mg, IntraVENous, Q6H PRN  oxyCODONE (ROXICODONE) immediate release tablet 5 mg, 5 mg, Oral, Q4H PRN  PHENobarbital (LUMINAL) tablet 64.8 mg, 64.8 mg, Oral, BID  ALPRAZolam (XANAX) tablet 1 mg, 1 mg, Oral, BID PRN    Family History:       Problem Relation Age of Onset    Cancer Mother     Heart Disease Mother     Cancer Brother     Cancer Maternal Grandmother        Social History:  Lives With: Daughter  Type of Home: House  Home Layout: Two level,Able to Live on Main level with bedroom/bathroom,Laundry in basement (rail on L for basement)  Home Access: Stairs to enter without rails  Entrance Stairs - Number of Steps: 2 steps to enter  Bathroom Shower/Tub: Tub/Shower unit,Curtain  Bathroom Toilet: Standard  Bathroom Equipment: Grab bars around toilet (L toilet rail)  Home Equipment: Rolling walker,Cane (pt not using any DME prior to admission)  Receives Help From: Family (pt has a supportive dtr but is gone a lot working, not home very much. Dtr will be moving out in ~2 weeks. pt has a son with disabilities, not able to help much. All neighbors work, no other family)  ADL Assistance: Independent  Homemaking Assistance: Independent  Homemaking Responsibilities: Yes  Meal Prep Responsibility: Secondary (dtr completes majority of meals)  Laundry Responsibility: Secondary (shared with dtr)  Cleaning Responsibility: Secondary (shared with dtr)  Bill Paying/Finance Responsibility: Primary  Shopping Responsibility: Secondary (dtr completes majority.  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[employer], [place]  Social History     Socioeconomic History    Marital status:      Spouse name: None    Number of children: None    Years of education: None    Highest education level: None   Occupational History    None   Tobacco Use    Smoking status: Never Smoker    Smokeless tobacco: Never Used   Substance and Sexual Activity    Alcohol use: Yes     Comment: Socially    Drug use: Never    Sexual activity: None   Other Topics Concern    None   Social History Narrative    None Social Determinants of Health     Financial Resource Strain: Low Risk     Difficulty of Paying Living Expenses: Not hard at all   Food Insecurity: No Food Insecurity    Worried About Running Out of Food in the Last Year: Never true    [place] of Food in the Last Year: Never true   Transportation Needs:     Lack of Transportation (Medical): Not on file    Lack of Transportation (Non-Medical): Not on file   Physical Activity:     Days of Exercise per Week: Not on file    Minutes of Exercise per Session: Not on file   Stress:     Feeling of Stress : Not on file   Social Connections:     Frequency of Communication with Friends and Family: Not on file    Frequency of Social Gatherings with Friends and Family: Not on file    Attends [employer] Services: Not on file    Active Member of [address] [place] [address] [employer] or Organizations: Not on file    Attends Club or Organization Meetings: Not on file    Marital Status: Not on file   Intimate Partner Violence:     Fear of Current or Ex-Partner: Not on file    Emotionally Abused: Not on file    Physically Abused: Not on file    Sexually Abused: Not on file   Housing Stability:     Unable to Pay for Housing in the Last Year: Not on file    Number of [place] in the Last Year: Not on file    Unstable Housing in the Last Year: Not on file       Physical Exam:  BP 93/63   Pulse 97   Temp 97.5 °F (36.4 °C) (Temporal)   Resp 15   Ht 5' (1.524 m)   Wt 98 lb (44.5 kg)   SpO2 97%   BMI 19.14 kg/m²     GEN: Well developed, well nourished, no acute distress  HEENT: NCAT. EOM grossly intact. Hearing grossly intact. Mucous membranes pink and moist.  RESP: Normal breath sounds with no wheezing, rales, or rhonchi. Respirations WNL and unlabored. CV: Regular rate and rhythm. No murmurs, rubs, or gallops. ABD: Soft, non-distended, BS+ and equal.  NEURO: Alert. Speech fluent. Sensation to light touch intact. MSK:  Muscle tone and bulk are normal bilaterally.  Strength 4+/5 in bilateral upper limbs. Strength 4+/5 with bilateral ankle dorsiflexion and plantarflexion. LIMBS: No edema in bilateral lower limbs. SKIN: Warm and dry with good turgor. PSYCH: Mood WNL. Affect WNL. Appropriately interactive. Diagnostics:    CBC:   Recent Labs     02/14/22  0548 02/15/22  0427 02/16/22  0843   WBC 6.9 6.6  --    RBC 4.58 3.44*  --    HGB 13.1 10.1* 9.0*   HCT 40.6 31.0* 26.9*   MCV 88.6 90.1  --    RDW 13.5 13.5  --     210  --      BMP:   Recent Labs     02/14/22  0548 02/15/22  0427    136   K 3.9 3.7    104   CO2 25 25   BUN 6 8   CREATININE 0.35* 0.41*   GLUCOSE 105* 102*      HbA1c: No results found for: LABA1C  BNP: No results for input(s): BNP in the last 72 hours. PT/INR:   Recent Labs     02/14/22 0041   PROTIME 10.6   INR 1.0     APTT:   Recent Labs     02/14/22 0041   APTT 23.7     CARDIAC ENZYMES: No results for input(s): CKMB, CKMBINDEX, TROPONINT in the last 72 hours. Invalid input(s): CKTOTAL;3  FASTING LIPID PANEL:  Lab Results   Component Value Date    HDL 75 08/14/2019     LIVER PROFILE: No results for input(s): AST, ALT, ALB, BILIDIR, BILITOT, ALKPHOS in the last 72 hours. Radiology:  XR HIP 2-3 VW W PELVIS RIGHT   Final Result   Expected postoperative findings status post right hip arthroplasty. FLUORO FOR SURGICAL PROCEDURES   Final Result      XR CHEST (SINGLE VIEW FRONTAL)   Final Result   No acute process. XR PELVIS (1-2 VIEWS)   Final Result   Fracture of the right femoral neck is again noted. No dislocation of the   right femoral head. Osteopenia of the bones and contrast filled bladder over the central pelvis. No displacement is seen of the pelvic ring. CT CHEST ABDOMEN PELVIS W CONTRAST   Final Result   Mildly offset acute right femoral neck fracture. Status post cholecystectomy and hysterectomy. XR CHEST PORTABLE   Final Result   No acute process.          XR HIP 2-3 VW W PELVIS RIGHT   Final Result   Nondisplaced subcapital fracture of the right femoral neck. XR FEMUR RIGHT (MIN 2 VIEWS)   Final Result   Nondisplaced subcapital fracture of the right femoral neck. Impression:    1. Right hip fracture s/p total hip arthroplasty on 2/14  2. Urinary retention  3. Constipation  4. Anemia  5. Seizures  6. Migraines    Recommendations:    1. Diagnosis:  Right hip fracture s/p total hip arthroplasty  2. Therapy: Has PT/OT needs  3. Medical Necessity: As above  4. Support: Lives alone  5. Rehab Recommendation:  The patient will benefit from acute inpatient rehabilitation once medically stable per primary service. Anticipate she will be able to tolerate 3 hours of therapy per day in rehabilitation. The patient requires multidisciplinary rehabilitation treatment including medical management by a PM&R physician, 24 hour rehabilitation nursing, physical therapy, occupational therapy, rehabilitation social work, and nutrition services. Patient and family also require education in post-hospital precautions and home exercise routine, adaptive techniques and deficit compensation strategies, strengthening and conditioning, equipment prescription and instructions in use. - Patient may progress quickly with therapies    6. DVT Prophylaxis: Lovenox    It was my pleasure to evaluate Castro Mckinney today. Please call with questions.     Kaylen Rees MD

## 2022-02-16 NOTE — PROGRESS NOTES
PROGRESS NOTE          PATIENT NAME: Claus Choudhary  MEDICAL RECORD NO. [id number]  DATE: 2022  PRIMARY CARE PHYSICIAN: Yani Stock MD    HD: # 2    ASSESSMENT    Patient Active Problem List   Diagnosis    Migraine without aura and without status migrainosus, not intractable    Generalized anxiety disorder    Chronic bilateral low back pain without sciatica    Primary osteoarthritis involving multiple joints    Seizure disorder (HCC)    Vestibular hypofunction of both ears    S/P total knee arthroplasty    Migraine    Familial hypercholesterolemia    Chronic vertigo    Anxiety    Closed fracture of neck of right femur [facility])       MEDICAL DECISION MAKING AND PLAN    Right femoral neck fracture s/p right hip arthroplasty ()              -Encourage PT/OT              -WBAT RLE    -Continue multimodal pain management    Wean narcotics   -Consult to PM&R for rehab placement              -F/u Dr. Jaqueline Yeager     lovenox x 5 weeks after dc    Urinary retention    straight cath with 350 cc's on bladder scan if needed. continnue plan as OP  Acute blood loss anemia   hgb drop post op   No symptoms, hgb  Adequate   Minimize phlebotomy    DVT ppx: lovenox    Home phenobarb resumed for seizures    Reg diet     Disp: Discharge planning and placement    Chief Complaint: Who had a [facility] and sustained a right femoral neck fracture s/p right total hip arthroplasty on     SUBJECTIVE    Thais Mckinney Patient seen and examined. No complaints or concerns. No issue overnight per patient and nursing. Pain controlled on current regimen. Denies fever, HA, CP, SOB, N/V, dysuria. Denies BM, positive flatus. Ambulated 8 ft with PT yesterday.  Required straight cath x2 with 500 and 300 cc's out respectively       OBJECTIVE  VITALS: Temp: Temp: 97.8 °F (36.6 °C)Temp  Av.2 °F (36.8 °C)  Min: 97.4 °F (36.3 °C)  Max: 99.1 °F (07.2 °C) BP Systolic (10JKT), MRC:36 , Min:90 , ZSJ:319   Diastolic (57EXF), Av, Min:65, Max:75   Pulse Pulse  Av.9  Min: 84  Max: 94 Resp Resp  Av.7  Min: 12  Max: 16 Pulse ox SpO2  Av.6 %  Min: 95 %  Max: 98 %  GENERAL: alert, no distress   HEENT: atraumatic, normocephalic  LUNGS: normal effort on RA, no respiratory distress, no accessory muscle use   HEART: regular rate  ABDOMEN: soft, nontender, nondistended  EXTREMITY: RLE: Optifaom dressing on, c/d/i. No sign of strike through bleeding. Appropriate tenderness about the hip and thigh. Leg lengths appear equal. Compartments soft and compressible. EHL/FHL/TA/GSC motor intact. Sensation intact to sural/saph/SPN/DPN distribution. DP/PT pulses 2+. I/O last 3 completed shifts:   In: 240 [P.O.:240]  Out: 1615 [Urine:1615]    Drain/tube output:  In: 240 [P.O.:240]  Out: 1330 [Urine:1330]    LAB:  CBC:   Recent Labs     22  0548 02/15/22  0427   WBC 6.3 6.9 6.6   HGB 14.8 13.1 10.1*   HCT 44.8 40.6 31.0*   MCV 88.5 88.6 90.1    262 210     BMP:   Recent Labs     22  0548 02/15/22  0427    138 136   K 3.9 3.9 3.7    106 104   CO2 24 25 25   BUN 8 6 8   CREATININE 0.44* 0.35* 0.41*   GLUCOSE 101* 105* 102*     COAGS:   Recent Labs     22  0041   APTT 23.7   INR 1.0       RADIOLOGY:  No new films taken today

## 2022-02-16 NOTE — PROGRESS NOTES
Orthopedic Progress Note    Patient:  Joe Coy  YOB: 1965     61 y.o. female    Subjective:  Patient seen and examined  No complaints or concerns per nursing or patient  No issue overnight  Pain controlled  Denies fever, HA, CP, SOB, N/V, dysuria  Denies BM, positive flatus  Ambulated 8 ft with PT yesterday  Required straight cath x2 with 500 and 300 cc's out respectively     Vitals reviewed    Objective:   Vitals:    02/16/22 0301   BP: 90/72   Pulse: 85   Resp: 12   Temp: 97.4 °F (36.3 °C)   SpO2: 96%       Gen: NAD, cooperative    Cardiovascular: Regular rate    Respiratory: Chest symmetric, no accessory muscle use, normal respirations, no audible wheezes    MSK: RLE: Optifaom dressing on, c/d/i. No sign of strike through bleeding. Appropriate tenderness about the hip and thigh. Leg lengths appear equal. Compartments soft and compressible. EHL/FHL/TA/GSC motor intact. Sensation intact to sural/saph/SPN/DPN distribution. DP/PT pulses 2+. Recent Labs     02/14/22  0041 02/14/22  0548 02/15/22  0427   WBC  --    < > 6.6   HGB  --    < > 10.1*   HCT  --    < > 31.0*   PLT  --    < > 210   INR 1.0  --   --    NA  --    < > 136   K  --    < > 3.7   BUN  --    < > 8   CREATININE  --    < > 0.41*   GLUCOSE  --    < > 102*    < > = values in this interval not displayed. DVT ppx: Lovenox    See rec for complete list    Impression/plan: 61 y.o. female who [address], being seen for:     -Right femoral neck fracture s/p DENIZ POD#2     -Optifoam dressings on. Please maintain. Orthopedics to manage. -WB status: WBAT RLE  -Pain control per primary team.  Recommend multimodal pain management  -DVT ppx: Lovenox  -Completed post op Ancef  -Urinary retention. Trauma team to manage.  -Encourage Ice for pain control  -Encourage Incentive Spirometry use  -PT/OT evaluate and treat for mobilization  -Ok to discharge from orthopedic perspective.  Patient agreeable to short term nursing facility due to living alone  -F/u with Dr. Todd Sabillon outpatient on 2/28  -Please page ortho with any questions    Stormy Pino DO   Orthopedic Surgery Resident PGY-1  [address] [facility]

## 2022-02-16 NOTE — PROGRESS NOTES
Physical Therapy  Facility/Department: [address] BURN UNIT  Daily Treatment Note  NAME: Manav Crawford  : 1965  MRN: [id number]    Date of Service: 2022    Discharge Recommendations:  Patient would benefit from continued therapy after discharge   PT Equipment Recommendations  Equipment Needed: No (Pt states she owns a RW from previous surgeries.)    Assessment   Body structures, Functions, Activity limitations: Decreased functional mobility ; Decreased ROM; Decreased strength;Decreased balance; Increased pain;Decreased posture  Assessment: Pt perfomed five 6in step ups with RW for BUE support and CGA. Amb 75ft x2 with RW CGA. Pt steady and demo good awareness of precautions throughout. Pt would benefit from continued PT to address strength/endurance deficits and improve functional independence. Prognosis: Good  PT Education: Goals;PT Role;Plan of Care;Home Exercise Program;Precautions;Transfer Training;Energy Conservation;General Safety;Weight-bearing Education;Gait Training;Disease Specific Education; Functional Mobility Training; Injury Prevention  Patient Education: Pt educated on hip precuations and safety. REQUIRES PT FOLLOW UP: Yes  Activity Tolerance  Activity Tolerance: Patient limited by pain     Patient Diagnosis(es): The primary encounter diagnosis was Closed fracture of neck of right femur, initial encounter ([facility] [address] A diagnosis of Fall from standing, initial encounter was also pertinent to this visit. has a past medical history of Migraines and Seizures ([facility] [address] has a past surgical history that includes shoulder surgery (Left, ); Mandible fracture surgery;  section; Hysterectomy, total abdominal; Total knee arthroplasty (Right, 2019); Total knee arthroplasty (Left, 2017); Cholecystectomy, laparoscopic (); Hip Arthroplasty (Right, 2022); and hip surgery (Right, 2022).     Restrictions  Restrictions/Precautions  Restrictions/Precautions: Surgical Protocols,Fall Risk,Up as Tolerated,Weight Bearing  Required Braces or Orthoses?: No  Lower Extremity Weight Bearing Restrictions  Right Lower Extremity Weight Bearing: Weight Bearing As Tolerated  Position Activity Restriction  Hip Precautions: No hip flexion > 90 degrees,No active ABduction,No ADduction,No hip internal rotation,No hip external rotation  Other position/activity restrictions: Right hip anterior precautions. Avoid excessive extension, external rotation, and abduction maneuvers. Avoid crossing over of legs. Subjective   General  Chart Reviewed: Yes  Response To Previous Treatment: Patient with no complaints from previous session. Family / Caregiver Present: No  Subjective  Subjective: RN and pt agreeable to PT. Pt supine in bed upon arrival, pleasant and cooperative throughout. Pain Screening  Patient Currently in Pain: Yes  Pain Assessment  Pain Assessment: 0-10  Pain Level: 4  Pain Type: Surgical pain  Pain Location: Hip  Pain Orientation: Right  Pain Descriptors: Discomfort; Throbbing  Vital Signs  Patient Currently in Pain: Yes       Orientation  Orientation  Overall Orientation Status: Within Normal Limits  Cognition   Cognition  Overall Cognitive Status: WFL  Objective   Bed mobility  Supine to Sit: Minimal assistance  Sit to Supine: Unable to assess  Scooting: Contact guard assistance  Comment: Pt supine in bed upon entry and retired to chair at exit this date. Pt required Usman for RLE progression, utilized bed rails without cueing. Transfers  Sit to Stand: Contact guard assistance  Stand to sit: Contact guard assistance  Bed to Chair: Contact guard assistance  Comment: Pt performed STS from bed with use of RW for UE support.   Ambulation  Ambulation?: Yes  Ambulation 1  Surface: level tile  Device: Rolling Walker  Assistance: Contact guard assistance  Quality of Gait: steady, no LOB noted  Gait Deviations: Slow Karen  Distance: 75ft x2  Comments: Pt amb from bed down the lagos to perform step ups. Pt demo good safety awareness and maintenance of walker positioning t/o. Required two standing rest breaks throughout. Stairs/Curb  Stairs?: Yes  Stairs  # Steps : 5  Stairs Height: 6\"  Rails: Bilateral  Device: Rolling walker  Assistance: Contact guard assistance  Comment: Pt amb 3 steps forward and two steps retro with RW for BUE support. Cueing provided for feroz with good return. Pt steady throughout, increased pain in RLE during descending steps. Balance  Posture: Fair  Sitting - Static: Good  Sitting - Dynamic: Good  Standing - Static: Fair;+  Standing - Dynamic: Fair;+  Comments: Standing balance assessed with RW, seated balance assessed sitting in chair. Exercises  Seated LE exercise program: Long Arc Quads and heel/toe raises. Reps: x10  Supine Exercises: Gluteal sets, Hamstring Sets, Quad Sets. Reps: x10  Comments: Pt performed sitting exercises in chair and supine exercises in chair with legs elevated. Required multiple rest breaks throughout.        AM-PAC Score  AM-PAC Inpatient Mobility Raw Score : 18 (02/16/22 0917)  AM-PAC Inpatient T-Scale Score : 43.63 (02/16/22 0917)  Mobility Inpatient CMS 0-100% Score: 46.58 (02/16/22 0917)  Mobility Inpatient CMS G-Code Modifier : CK (02/16/22 0917)          Goals  Short term goals  Time Frame for Short term goals: 14 visits  Short term goal 1: independent bed mobility  Short term goal 2: independent transfers  Short term goal 3: independent gait with rwalker x 50'  Short term goal 4: stair ambulation x 2 steps without HR, min A+1  Short term goal 5: independent with HEP for DENIZ protocol and precautions  Patient Goals   Patient goals : decrease pain, be able to walk better    Plan    Plan  Times per week: BID  Times per day: Twice a day  Current Treatment Recommendations: Strengthening,ROM,Balance Training,Functional Mobility Training,Transfer Training,Endurance Training,Gait Training,Stair training,Pain Management,Home Exercise Program,Safety Education & Training,Patient/Caregiver Education & Training,Equipment Evaluation, Education, & procurement,Positioning  Safety Devices  Type of devices: Call light within reach,Gait belt,Patient at risk for falls,Nurse notified,Left in chair  Restraints  Initially in place: No     Therapy Time   Individual Concurrent Group Co-treatment   Time In 0836         Time Out 0915         Minutes 39         Timed Code Treatment Minutes: 425  [facility] performed by Student PTA under the supervision of co-signing PTA who agrees with all treatment and documentation.    Ailyn Boyer PTA

## 2022-02-16 NOTE — PLAN OF CARE
Problem: Skin Integrity:  Goal: Will show no infection signs and symptoms  Description: Will show no infection signs and symptoms  2/16/2022 0512 by Ramírez Calloway RN  Outcome: Ongoing     Problem: Skin Integrity:  Goal: Absence of new skin breakdown  Description: Absence of new skin breakdown  2/16/2022 0512 by Ramírez Calloway RN  Outcome: Ongoing     Problem: Falls - Risk of:  Goal: Will remain free from falls  Description: Will remain free from falls  2/16/2022 0512 by Ramírez Calloway RN  Outcome: Ongoing     Problem: Falls - Risk of:  Goal: Absence of physical injury  Description: Absence of physical injury  2/16/2022 0512 by Ramírez Calloway RN  Outcome: Ongoing     Problem: Pain:  Goal: Pain level will decrease  Description: Pain level will decrease  2/16/2022 0512 by Ramírez Calloway RN  Outcome: Ongoing     Problem: Pain:  Goal: Control of acute pain  Description: Control of acute pain  2/16/2022 0512 by Ramírez Calloway RN  Outcome: Ongoing     Problem: Pain:  Goal: Control of chronic pain  Description: Control of chronic pain  2/16/2022 0512 by Ramírez Calloway RN  Outcome: Ongoing     Problem: Musculor/Skeletal Functional Status  Goal: Highest potential functional level  2/16/2022 0512 by Ramírez Calloway RN  Outcome: Ongoing     Problem: Musculor/Skeletal Functional Status  Goal: Absence of falls  2/16/2022 0512 by Ramírez Calloway RN  Outcome: Ongoing

## 2022-02-16 NOTE — PROGRESS NOTES
Physical Therapy        Physical Therapy Cancel Note      DATE: 2022    NAME: Sharia Severance  MRN: [id number]   : 1965      Patient not seen this date for Physical Therapy due to: Other: Pt working with OT. Next schedule for       Electronically signed by Ailyn Boyer PTA on 2022 at 4:17 PM

## 2022-02-16 NOTE — PROGRESS NOTES
Occupational Therapy  Facility/Department: [address] BURN UNIT  Daily Treatment Note  NAME: Altagracia Mendez  : 1965  MRN: [id number]    Date of Service: 2022    Discharge Recommendations:  Patient would benefit from continued therapy after discharge  OT Equipment Recommendations  ADL Assistive Devices: Shower Chair with back;Long-handled Sponge;Grab Bars - shower;Hand-held Shower; Reacher    Assessment   Performance deficits / Impairments: Decreased functional mobility ; Decreased safe awareness;Decreased balance;Decreased ADL status; Decreased endurance;Decreased high-level IADLs  Treatment Diagnosis: Closed Fracture R Femoral Neck-S/P RTHA  Prognosis: Good  OT Education: OT Role;Plan of Care;Precautions; ADL Adaptive Strategies  Patient Education: Pt educ on OT POC, safety awareness, hip precautions, and hand placement. REQUIRES OT FOLLOW UP: Yes  Activity Tolerance  Activity Tolerance: Patient Tolerated treatment well  Safety Devices  Safety Devices in place: Yes  Type of devices: Call light within reach;Gait belt;Nurse notified; Left in chair  Restraints  Initially in place: No         Patient Diagnosis(es): The primary encounter diagnosis was Closed fracture of neck of right femur, initial encounter ([facility] [address] Diagnoses of Fall from standing, initial encounter and Seizure disorder [facility]) were also pertinent to this visit. has a past medical history of Migraines and Seizures ([facility] [address] has a past surgical history that includes shoulder surgery (Left, ); Mandible fracture surgery;  section; Hysterectomy, total abdominal; Total knee arthroplasty (Right, 2019); Total knee arthroplasty (Left, 2017); Cholecystectomy, laparoscopic (); Hip Arthroplasty (Right, 2022); and hip surgery (Right, 2022).     Restrictions  Restrictions/Precautions  Restrictions/Precautions: Surgical Protocols,Fall Risk,Up as Tolerated,Weight Bearing  Required Braces or Orthoses?: No  Lower Extremity Weight Bearing Restrictions  Right Lower Extremity Weight Bearing: Weight Bearing As Tolerated  Position Activity Restriction  Hip Precautions: No hip flexion > 90 degrees,No active ABduction,No ADduction,No hip internal rotation,No hip external rotation  Other position/activity restrictions: Right hip anterior precautions. Avoid excessive extension, external rotation, and abduction maneuvers. Avoid crossing over of legs. Subjective   General  Patient assessed for rehabilitation services?: Yes  Family / Caregiver Present: No  Pain Assessment  Pain Level: 4  Vital Signs  Patient Currently in Pain: Yes   Orientation  Orientation  Overall Orientation Status: Within Functional Limits    Objective    ADL  Grooming: Setup;Stand by assistance; Increased time to complete (Pt washed face, brushed teeth, brushed hair seated in chair)  UE Bathing: Setup; Increased time to complete;Minimal assistance (Pt washed UB, needed assist to wash back)  LE Bathing: Setup; Increased time to complete; Moderate assistance (Pt washed tops of BLE, joesph area, L leg/foot. Needed assist to wash RLE and feet d/t hip precautions and pain)  UE Dressing: Stand by assistance;Setup; Increased time to complete (Pt able to thread UE and tie gown)  LE Dressing: Moderate assistance;Setup; Increased time to complete (assist to doff footie on R foot, able to doff on L, assist to don on both)  Toileting: Setup; Increased time to complete;Contact guard assistance (Pt seated on toilet to wipe, stood at sink to wash joesph area w/ CGA, RW, gait belt)      Additional Comments: Pt setup at tray table for self care (see above for LOF). Educ and demo DENIZ prec. Issued written info and pt verbalized understanding. Needed vc's throughout tx to maintain 90 degree hip flexion and not to place left foot under R leg to assist w/moving RLE.      Balance  Sitting Balance: Stand by assistance  Standing Balance: Contact guard assistance  Standing Balance  Time: Pt stood approx 8-10 total mins  Activity: Pt stood to clean joesph area and toilet w/ use of RW, w/ no gross LOB  Functional Mobility  Functional - Mobility Device: Rolling Walker  Activity: To/from bathroom  Assist Level: Contact guard assistance  Functional Mobility Comments: Pt able to walk from recliner<bathroom and back with no SOB and no LOB, some pain noted. Toilet Transfers  Toilet Transfer: Contact guard assistance  Toilet Transfers Comments: Vc's for hand placement  Bed mobility  Supine to Sit: Stand by assistance  Scooting: Stand by assistance  Comment: Pt in bed w/HOB elevated upon arrival, transferred to EOB w/min assist for RLE progression to EOB. STS transfer and func mob to recliner. Pt on RA throughout Tx.      Transfers  Stand Step Transfers: Contact guard assistance  Sit to stand: Contact guard assistance (use of RW + gait belt for stability and vc's for hand placement)  Stand to sit: Contact guard assistance (use of RW and gait belt for stability, some rest breaks d/t pain)      Cognition  Overall Cognitive Status: [facility]      Plan   Plan  Times per week: 5-6 x week**R DENIZ**      AM-PAC Score   AM-PAC Inpatient Daily Activity Raw Score: 19 (02/16/22 1602)  AM-PAC Inpatient ADL T-Scale Score : 40.22 (02/16/22 1602)  ADL Inpatient CMS 0-100% Score: 42.8 (02/16/22 1602)  ADL Inpatient CMS G-Code Modifier : CK (02/16/22 1602)    Goals  Short term goals  Time Frame for Short term goals: Pt will, by discharge:  Short term goal 1: Dem I with bed mobility for daily tasks  Short term goal 2: Dem min a for adl performance with use of DME  Short term goal 3: Dem sba for functional transfers for daily occupations  Short term goal 4: Dem sba for dynamic mobility with LRD for household distance  Short term goal 5: Dem good safety awareness tech while utilizing/recalling anterior hip precautions  Short term goal 6: Dem a 10 min dynamic task with SBA to increase I with IADLS       Therapy Time   Individual Concurrent Group Co-treatment Time In 1445         Time Out 1540         Minutes 55         Timed Code Treatment Minutes: Tab 200 S/LISA  JERRELL Rose/RODRIGO

## 2022-02-17 PROCEDURE — 6370000000 HC RX 637 (ALT 250 FOR IP): Performed by: STUDENT IN AN ORGANIZED HEALTH CARE EDUCATION/TRAINING PROGRAM

## 2022-02-17 PROCEDURE — 97110 THERAPEUTIC EXERCISES: CPT

## 2022-02-17 PROCEDURE — 51798 US URINE CAPACITY MEASURE: CPT

## 2022-02-17 PROCEDURE — 97530 THERAPEUTIC ACTIVITIES: CPT

## 2022-02-17 PROCEDURE — 1200000000 HC SEMI PRIVATE

## 2022-02-17 PROCEDURE — 2580000003 HC RX 258: Performed by: STUDENT IN AN ORGANIZED HEALTH CARE EDUCATION/TRAINING PROGRAM

## 2022-02-17 PROCEDURE — 97116 GAIT TRAINING THERAPY: CPT

## 2022-02-17 PROCEDURE — 97535 SELF CARE MNGMENT TRAINING: CPT

## 2022-02-17 PROCEDURE — 6360000002 HC RX W HCPCS: Performed by: NURSE PRACTITIONER

## 2022-02-17 RX ADMIN — OXYCODONE 5 MG: 5 TABLET ORAL at 11:35

## 2022-02-17 RX ADMIN — METHOCARBAMOL TABLETS 750 MG: 500 TABLET, COATED ORAL at 01:31

## 2022-02-17 RX ADMIN — SODIUM CHLORIDE, PRESERVATIVE FREE 10 ML: 5 INJECTION INTRAVENOUS at 10:50

## 2022-02-17 RX ADMIN — ENOXAPARIN SODIUM 30 MG: 100 INJECTION SUBCUTANEOUS at 10:45

## 2022-02-17 RX ADMIN — ACETAMINOPHEN 1000 MG: 500 TABLET ORAL at 14:05

## 2022-02-17 RX ADMIN — IBUPROFEN 400 MG: 400 TABLET, FILM COATED ORAL at 10:47

## 2022-02-17 RX ADMIN — OXYCODONE 5 MG: 5 TABLET ORAL at 16:03

## 2022-02-17 RX ADMIN — OXYCODONE 5 MG: 5 TABLET ORAL at 20:05

## 2022-02-17 RX ADMIN — ACETAMINOPHEN 1000 MG: 500 TABLET ORAL at 06:32

## 2022-02-17 RX ADMIN — GABAPENTIN 300 MG: 600 TABLET ORAL at 10:46

## 2022-02-17 RX ADMIN — OXYCODONE 5 MG: 5 TABLET ORAL at 06:53

## 2022-02-17 RX ADMIN — GABAPENTIN 300 MG: 600 TABLET ORAL at 20:05

## 2022-02-17 RX ADMIN — POLYETHYLENE GLYCOL 3350 17 G: 17 POWDER, FOR SOLUTION ORAL at 10:49

## 2022-02-17 RX ADMIN — PHENOBARBITAL 64.8 MG: 64.8 TABLET ORAL at 20:05

## 2022-02-17 RX ADMIN — GABAPENTIN 300 MG: 600 TABLET ORAL at 14:06

## 2022-02-17 RX ADMIN — METHOCARBAMOL TABLETS 750 MG: 500 TABLET, COATED ORAL at 17:24

## 2022-02-17 RX ADMIN — METHOCARBAMOL TABLETS 750 MG: 500 TABLET, COATED ORAL at 10:47

## 2022-02-17 RX ADMIN — ACETAMINOPHEN 1000 MG: 500 TABLET ORAL at 20:05

## 2022-02-17 RX ADMIN — IBUPROFEN 400 MG: 400 TABLET, FILM COATED ORAL at 17:24

## 2022-02-17 RX ADMIN — PHENOBARBITAL 64.8 MG: 64.8 TABLET ORAL at 15:53

## 2022-02-17 ASSESSMENT — PAIN SCALES - GENERAL
PAINLEVEL_OUTOF10: 6
PAINLEVEL_OUTOF10: 2
PAINLEVEL_OUTOF10: 5
PAINLEVEL_OUTOF10: 0
PAINLEVEL_OUTOF10: 6
PAINLEVEL_OUTOF10: 5
PAINLEVEL_OUTOF10: 5
PAINLEVEL_OUTOF10: 1
PAINLEVEL_OUTOF10: 1
PAINLEVEL_OUTOF10: 3
PAINLEVEL_OUTOF10: 7
PAINLEVEL_OUTOF10: 1
PAINLEVEL_OUTOF10: 5
PAINLEVEL_OUTOF10: 2
PAINLEVEL_OUTOF10: 9

## 2022-02-17 ASSESSMENT — PAIN SCALES - WONG BAKER
WONGBAKER_NUMERICALRESPONSE: 6

## 2022-02-17 ASSESSMENT — PAIN DESCRIPTION - LOCATION
LOCATION: HIP
LOCATION: RIB CAGE;HIP
LOCATION: HIP
LOCATION: HIP;RIB CAGE
LOCATION: HIP

## 2022-02-17 ASSESSMENT — PAIN DESCRIPTION - PAIN TYPE
TYPE: SURGICAL PAIN
TYPE: ACUTE PAIN

## 2022-02-17 ASSESSMENT — PAIN DESCRIPTION - DESCRIPTORS
DESCRIPTORS: DISCOMFORT;THROBBING
DESCRIPTORS: DISCOMFORT;THROBBING
DESCRIPTORS: SORE;DISCOMFORT

## 2022-02-17 ASSESSMENT — PAIN DESCRIPTION - ORIENTATION
ORIENTATION: RIGHT

## 2022-02-17 ASSESSMENT — PAIN DESCRIPTION - FREQUENCY: FREQUENCY: CONTINUOUS

## 2022-02-17 NOTE — PROGRESS NOTES
PROGRESS NOTE          PATIENT NAME: Amie Manriquez  MEDICAL RECORD NO. [id number]  DATE: 2/17/2022  PRIMARY CARE PHYSICIAN: Nate Ngo MD    HD: # 3    ASSESSMENT    Patient Active Problem List   Diagnosis    Migraine without aura and without status migrainosus, not intractable    Generalized anxiety disorder    Chronic bilateral low back pain without sciatica    Primary osteoarthritis involving multiple joints    Seizure disorder (HCC)    Vestibular hypofunction of both ears    S/P total knee arthroplasty    Migraine    Familial hypercholesterolemia    Chronic vertigo    Anxiety    Closed fracture of neck of right femur [facility])       MEDICAL DECISION MAKING AND PLAN    Right femoral neck fracture s/p right hip arthroplasty (2/14)              -Encourage PT/OT              -WBAT RLE               -Continue multimodal pain management                          Wean narcotics              -Consult to PM&R for rehab placement              -F/u Dr. Claudean Ferrari 2/28               -Lovenox x 5 weeks after dc     Urinary retention               Able to urinate on her own with 500cc output   Will continue to monitor with bladder scans as needed    Acute blood loss anemia              hgb drop post op              No symptoms, hgb  Adequate              Minimize phlebotomy   Will continue to follow     DVT ppx: lovenox     Home phenobarb resumed for seizures     Reg diet      Disp: Discharge planning and placement      Chief Complaint: Who had a [facility] and sustained a right femoral neck fracture s/p right total hip arthroplasty on 2/14    SUBJECTIVE    Haseeb Mckinney Patient seen and examined. No complaints or concerns. No issue overnight per patient and nursing. Pain controlled on current regimen. Denies fever, HA, CP, SOB, N/V, dysuria. Denies BM, positive flatus. Did not do PT yesterday due to working with OT. No need for further straight cath yesterday evening.   500cc's out on her own with <50cc's bladder scan after. OBJECTIVE  VITALS: Temp: Temp: 97.1 °F (36.2 °C)Temp  Av.9 °F (36.6 °C)  Min: 97.1 °F (36.2 °C)  Max: 98.6 °F (37 °C) BP Systolic (99ZMH), GZJ:29 , Min:85 , HGE:447   Diastolic (68NXO), VIF:21, Min:63, Max:79   Pulse Pulse  Av.8  Min: 81  Max: 97 Resp Resp  Avg: 15.2  Min: 12  Max: 18 Pulse ox SpO2  Av.3 %  Min: 97 %  Max: 98 %  GENERAL: alert, no distress   HEENT: atraumatic, normocephalic  LUNGS: normal effort on RA, no respiratory distress, no accessory muscle use   HEART: regular rate  ABDOMEN: soft, nontender, nondistended  EXTREMITY: RLE: Optifaom dressing on, c/d/i. No sign of strike through bleeding. Appropriate tenderness about the hip and thigh. Leg lengths appear equal. Compartments soft and compressible. EHL/FHL/TA/GSC motor intact. Sensation intact to sural/saph/SPN/DPN distribution. DP/PT pulses 2+. I/O last 3 completed shifts: In: 940 [P.O.:940]  Out: 1930 [TNPFY:3024]    Drain/tube output: In: 1680.3 [P.O.:1180; I.V.:500.3]  Out: 1800 [Urine:1800]    LAB:  CBC:   Recent Labs     22  0548 02/15/22  0427 22  0843   WBC 6.9 6.6  --    HGB 13.1 10.1* 9.0*   HCT 40.6 31.0* 26.9*   MCV 88.6 90.1  --     210  --      BMP:   Recent Labs     22  0548 02/15/22  0427    136   K 3.9 3.7    104   CO2 25 25   BUN 6 8   CREATININE 0.35* 0.41*   GLUCOSE 105* 102*     COAGS: No results for input(s): APTT, PROT, INR in the last 72 hours. RADIOLOGY:  No new films today      Mlnubia Henning DO  22, 4:40 AM         Attending Note    Discharge planning  I have reviewed the above TECSS note(s) and I either performed the key elements of the medical history and physical exam or was present with the resident when the key elements of the medical history and physical exam were performed. I have discussed the findings, established the care plan and recommendations with Resident, TECSS RN, bedside nurse.     Catherine Vazquez MD  2022  10:10 AM

## 2022-02-17 NOTE — PROGRESS NOTES
Occupational Therapy  Facility/Department: [address] BURN UNIT  Daily Treatment Note  NAME: Miguel Glover  : 1965  MRN: [id number]    Date of Service: 2022    Discharge Recommendations:  Patient would benefit from continued therapy after discharge  OT Equipment Recommendations  ADL Assistive Devices: Shower Chair with back;Long-handled Sponge;Grab Bars - shower; Reacher;Sock-Aid Soft; Toileting - Raised Toilet Seat with arms;Transfer Tub Bench;Long-handled Shoe Horn    Assessment   Performance deficits / Impairments: Decreased functional mobility ; Decreased balance;Decreased ADL status; Decreased endurance;Decreased high-level IADLs  Treatment Diagnosis: Closed Fracture R Femoral Neck-S/P RTHA  Prognosis: Good  OT Education: OT Role;Plan of Care;Precautions; ADL Adaptive Strategies  Patient Education: Pt educ on OT POC, safety awareness, hip precautions, energy conservation, fall precautions, hip kit devices. Issued written info and pt verbalized understanding. REQUIRES OT FOLLOW UP: Yes  Activity Tolerance  Activity Tolerance: Patient Tolerated treatment well  Activity Tolerance: No SOB noted  Safety Devices  Type of devices: Call light within reach;Nurse notified  Restraints  Initially in place: No         Patient Diagnosis(es): The primary encounter diagnosis was Closed fracture of [facility] of right femur[facility] Diagnoses of Fall from standing, initial encounter and Seizure disorder [facility]) were also pertinent to this visit. has a past medical history of Migraines and Seizures ([facility] [address] has a past surgical history that includes shoulder surgery (Left, ); Mandible fracture surgery;  section; Hysterectomy, total abdominal; Total knee arthroplasty (Right, 2019); Total knee arthroplasty (Left, 2017); Cholecystectomy, laparoscopic ();  Hip Arthroplasty (Right, 2022); and hip surgery (Right, 2/14/2022). Restrictions  Restrictions/Precautions  Restrictions/Precautions: Surgical Protocols,Fall Risk,Up as Tolerated,Weight Bearing  Required Braces or Orthoses?: No  Lower Extremity Weight Bearing Restrictions  Right Lower Extremity Weight Bearing: Weight Bearing As Tolerated  Position Activity Restriction  Hip Precautions: No hip flexion > 90 degrees,No active ABduction,No ADduction,No hip internal rotation,No hip external rotation  Other position/activity restrictions: Right DENIZ precautions. Avoid excessive extension, external rotation, and abduction maneuvers. Avoid crossing over of legs. Subjective   General  Patient assessed for rehabilitation services?: Yes  Family / Caregiver Present: No  Pain Assessment  Pain Level: 6  Pain Type: Acute pain  Pain Location: Hip;Rib cage  Pain Orientation: Right  Pain Descriptors: Sore;Discomfort  Pain Frequency: Continuous  Vital Signs  Patient Currently in Pain: Yes   Orientation  Orientation  Overall Orientation Status: Within Functional Limits  Objective    ADL  Feeding: Independent;Setup (Pt able to open containers and feed self)  Grooming: Setup;Stand by assistance; Increased time to complete (Pt brushed teeth and brushed hair)  Additional Comments: Pt in recliner upon arrival, RA on throughout tx. Pt declined bathing and dressing d/t changing gown prior to Tx and doing self care yesterday. Pt needed increase time and rest breaks to complete self care activites due pain and fatigue. Pt in recliner BLE elevated at end of session. Balance  Sitting Balance: Stand by assistance (Pt seated in recliner supported/unsupported)  Standing Balance: [place] (pt declined d/t pain and just up w/PT)    Comment: Func mob not completed today d/t just finished with PT prior to Tx and has R rib cage pain. RN notified and meds given.      Cognition  Overall Cognitive Status: [facility]         Plan   Plan  Times per week: 5-6 x week**R DENIZ**    AM-PAC Score  AM-PAC Inpatient Daily Activity Raw Score: 19 (02/17/22 1437)  AM-PAC Inpatient ADL T-Scale Score : 40.22 (02/17/22 1437)  ADL Inpatient CMS 0-100% Score: 42.8 (02/17/22 1437)  ADL Inpatient CMS G-Code Modifier : CK (02/17/22 1437)    Goals  Short term goals  Time Frame for Short term goals: Pt will, by discharge:  Short term goal 1: Dem I with bed mobility for daily tasks  Short term goal 2: Dem min a for adl performance with use of DME  Short term goal 3: Dem sba for functional transfers for daily occupations  Short term goal 4: Dem sba for dynamic mobility with LRD for household distance  Short term goal 5: Dem good safety awareness tech while utilizing/recalling anterior hip precautions  Short term goal 6: Dem a 10 min dynamic task with SBA to increase I with IADLS       Therapy Time   Individual Concurrent Group Co-treatment   Time In 1342         Time Out 1409         Minutes 27         Timed Code Treatment Minutes: Ashish Ennis/LISA ALLAN/RODRIGO

## 2022-02-17 NOTE — PLAN OF CARE
Problem: Skin Integrity:  Goal: Will show no infection signs and symptoms  Description: Will show no infection signs and symptoms  2/17/2022 0243 by Patrick Rivera RN  Outcome: Ongoing     Problem: Skin Integrity:  Goal: Absence of new skin breakdown  Description: Absence of new skin breakdown  2/17/2022 0243 by Patrick Rivera RN  Outcome: Ongoing     Problem: Falls - Risk of:  Goal: Will remain free from falls  Description: Will remain free from falls  2/17/2022 0243 by Patrick Rivera RN  Outcome: Ongoing     Problem: Falls - Risk of:  Goal: Absence of physical injury  Description: Absence of physical injury  2/17/2022 0243 by Patrick Rivera RN  Outcome: Ongoing     Problem: Pain:  Goal: Pain level will decrease  Description: Pain level will decrease  2/17/2022 0243 by Patrick Rivera RN  Outcome: Ongoing     Problem: Pain:  Goal: Control of acute pain  Description: Control of acute pain  2/17/2022 0243 by Patrick Rivera RN  Outcome: Ongoing     Problem: Pain:  Goal: Control of chronic pain  Description: Control of chronic pain  2/17/2022 0243 by Patrick Rivera RN  Outcome: Ongoing     Problem: Musculor/Skeletal Functional Status  Goal: Highest potential functional level  2/17/2022 0243 by Patrick Rivera RN  Outcome: Ongoing     Problem: Musculor/Skeletal Functional Status  Goal: Absence of falls  2/17/2022 0243 by Patrick Rivera RN  Outcome: Ongoing

## 2022-02-17 NOTE — PROGRESS NOTES
Physical Therapy  Facility/Department: [address] BURN UNIT  Daily Treatment Note  NAME: Ericka Salas  : 1965  MRN: [id number]    Date of Service: 2022    Discharge Recommendations:  Patient would benefit from continued therapy after discharge   PT Equipment Recommendations  Equipment Needed: No (Pt states she owns a RW from previous surgeries.)    Assessment   Body structures, Functions, Activity limitations: Decreased functional mobility ; Decreased ROM; Decreased strength;Decreased balance; Increased pain;Decreased posture  Assessment: Pt amb 75ft x2 with RW CGA. Pt steady and demo good awareness of precautions throughout. Pt would benefit from continued PT to address strength/endurance deficits and improve functional independence. Prognosis: Good  PT Education: Goals;PT Role;Plan of Care;Home Exercise Program;Precautions;Transfer Training;Energy Conservation;General Safety;Weight-bearing Education;Gait Training;Disease Specific Education; Functional Mobility Training; Injury Prevention  Patient Education: Pt reeducated on hip precuations, pursed lip breathing, and safety. REQUIRES PT FOLLOW UP: Yes  Activity Tolerance  Activity Tolerance: Patient limited by pain; Patient Tolerated treatment well;Patient limited by endurance  Activity Tolerance: SOB noted with amb and pain in ribs and R hip. Multiple rest breaks throughout. Patient Diagnosis(es): The primary encounter diagnosis was Closed fracture of neck of right femur, initial encounter ([facility] Diagnoses of Fall from standing, initial encounter and Seizure disorder [facility]) were also pertinent to this visit. has a past medical history of Migraines and Seizures ([facility] [address] has a past surgical history that includes shoulder surgery (Left, ); Mandible fracture surgery;  section; Hysterectomy, total abdominal; Total knee arthroplasty (Right, 2019); Total knee arthroplasty (Left, 2017); Cholecystectomy, laparoscopic ();  Hip Arthroplasty (Right, 02/14/2022); and hip surgery (Right, 2/14/2022). Restrictions  Restrictions/Precautions  Restrictions/Precautions: Surgical Protocols,Fall Risk,Up as Tolerated,Weight Bearing  Required Braces or Orthoses?: No  Lower Extremity Weight Bearing Restrictions  Right Lower Extremity Weight Bearing: Weight Bearing As Tolerated  Position Activity Restriction  Hip Precautions: No hip flexion > 90 degrees,No active ABduction,No ADduction,No hip internal rotation,No hip external rotation  Other position/activity restrictions: Right hip anterior precautions. Avoid excessive extension, external rotation, and abduction maneuvers. Avoid crossing over of legs. Subjective   General  Chart Reviewed: Yes  Response To Previous Treatment: Patient with no complaints from previous session. Family / Caregiver Present: No  Subjective  Subjective: RN and pt agreeable to PT. Pt seated in chair upon arrival, pleasant and cooperative throughout. General Comment  Comments: OT present upon exit. Pain Screening  Patient Currently in Pain: Yes  Pain Assessment  Pain Assessment: Faces  Pain Level: 5  [place] Pain Rating: Hurts even more  Pain Type: Acute pain  Pain Location: Rib cage; Hip  Pain Orientation: Right  Pain Descriptors: Discomfort; Throbbing  Vital Signs  Patient Currently in Pain: Yes       Orientation  Orientation  Overall Orientation Status: Within Normal Limits  Cognition   Cognition  Overall Cognitive Status: WFL  Objective   Bed mobility  Supine to Sit: Unable to assess  Sit to Supine: Unable to assess  Scooting: Stand by assistance  Comment: Pt seated in chair upon entry/ exit this date. Transfers  Sit to Stand: Contact guard assistance  Stand to sit: Contact guard assistance  Bed to Chair: Unable to assess  Comment: Pt performed STS from chair with use of RW for UE support. Pt required cueing for maintaining walker post amb until seated in chair.      Balance  Posture: Fair  Sitting - Static: Good  Sitting - Dynamic: Good  Standing - Static: Good  Standing - Dynamic: Fair;+  Comments: Standing balance assessed with RW, seated balance assessed sitting in chair. Exercises  Comments: Deferred exercises d/t OT coming to work with pt. AM-PAC Score  AM-PAC Inpatient Mobility Raw Score : 18 (02/17/22 1344)  AM-PAC Inpatient T-Scale Score : 43.63 (02/17/22 1344)  Mobility Inpatient CMS 0-100% Score: 46.58 (02/17/22 1344)  Mobility Inpatient CMS G-Code Modifier : CK (02/17/22 1344)          Goals  Short term goals  Time Frame for Short term goals: 14 visits  Short term goal 1: independent bed mobility  Short term goal 2: independent transfers  Short term goal 3: independent gait with rwalker x 50'  Short term goal 4: stair ambulation x 2 steps without HR, min A+1  Short term goal 5: independent with HEP for DENIZ protocol and precautions  Patient Goals   Patient goals : decrease pain, be able to walk better    Plan    Plan  Times per week: BID  Times per day: Twice a day  Current Treatment Recommendations: Strengthening,ROM,Balance Training,Functional Mobility Training,Transfer Training,Endurance Training,Gait Training,Stair training,Pain [employer] Exercise Program,Safety Education & Training,Patient/Caregiver Education & Training,Equipment Evaluation, Education, & procurement,Positioning  Safety Devices  Type of devices: Call light within reach,Gait belt,Patient at risk for falls,Nurse notified,Left in chair  Restraints  Initially in place: No     Therapy Time   Individual Concurrent Group Co-treatment   Time In 1327         Time Out 1341         Minutes 14         Timed Code Treatment Minutes: [address]    Treatment performed by Student PTA under the supervision of co-signing PTA who agrees with all treatment and documentation.    Viri Bernard PTA

## 2022-02-17 NOTE — PROGRESS NOTES
Physical Therapy  Facility/Department: [address] BURN UNIT  Daily Treatment Note  NAME: Jayden Spencer  : 1965  MRN: [id number]    Date of Service: 2022    Discharge Recommendations:  Patient would benefit from continued therapy after discharge   PT Equipment Recommendations  Equipment Needed: No (Pt states she owns a RW from previous surgeries.)    Assessment   Body structures, Functions, Activity limitations: Decreased functional mobility ; Decreased ROM; Decreased strength;Decreased balance; Increased pain;Decreased posture  Assessment: Pt perfomed twelve 6in step ups with RW for BUE support and CGA. Amb 75ft x2 with RW CGA. Pt steady and demo good awareness of precautions throughout. Pt would benefit from continued PT to address strength/endurance deficits and improve functional independence. Prognosis: Good  PT Education: Goals;PT Role;Plan of Care;Home Exercise Program;Precautions;Transfer Training;Energy Conservation;General Safety;Weight-bearing Education;Gait Training;Disease Specific Education; Functional Mobility Training; Injury Prevention  Patient Education: Pt reeducated on hip precuations, pursed lip breathing, and safety. HEP for supine exercises provided with good demo. REQUIRES PT FOLLOW UP: Yes  Activity Tolerance  Activity Tolerance: Patient limited by pain; Patient Tolerated treatment well     Patient Diagnosis(es): The primary encounter diagnosis was [facility] right [facility] Diagnoses of Fall from standing, initial encounter and Seizure disorder [facility]) were also pertinent to this visit. has a past medical history of Migraines and Seizures ([facility] [address] has a past surgical history that includes shoulder surgery (Left, ); Mandible fracture surgery;  section; Hysterectomy, total abdominal; Total knee arthroplasty (Right, 2019); Total knee arthroplasty (Left, 2017); Cholecystectomy, laparoscopic ();  Hip Arthroplasty (Right, 02/14/2022); and hip surgery (Right, 2/14/2022). Restrictions  Restrictions/Precautions  Restrictions/Precautions: Surgical Protocols,Fall Risk,Up as Tolerated,Weight Bearing  Required Braces or Orthoses?: No  Lower Extremity Weight Bearing Restrictions  Right Lower Extremity Weight Bearing: Weight Bearing As Tolerated  Position Activity Restriction  Hip Precautions: No hip flexion > 90 degrees,No active ABduction,No ADduction,No hip internal rotation,No hip external rotation  Other position/activity restrictions: Right hip anterior precautions. Avoid excessive extension, external rotation, and abduction maneuvers. Avoid crossing over of legs. Subjective   General  Chart Reviewed: Yes  Response To Previous Treatment: Patient with no complaints from previous session. Family / Caregiver Present: No  Subjective  Subjective: RN and pt agreeable to PT. Pt supine in bed upon arrival, pleasant and cooperative throughout. Pain Screening  Patient Currently in Pain: Yes  Pain Assessment  Pain Assessment: 0-10  Pain Level: 5  Pain Type: Acute pain  Pain Location: Hip  Pain Orientation: Right  Pain Descriptors: Discomfort; Throbbing  Vital Signs  Patient Currently in Pain: Yes       Orientation  Orientation  Overall Orientation Status: Within Normal Limits  Cognition   Cognition  Overall Cognitive Status: WFL  Objective   Bed mobility  Supine to Sit: Minimal assistance  Sit to Supine: Unable to assess  Scooting: Stand by assistance  Comment: Pt in bed w/ HOB elevated upon arrival, retired to recliner at end of session. Min assist for RLE progression to EOB. Transfers  Sit to Stand: Contact guard assistance  Stand to sit: Contact guard assistance  Bed to Chair: Contact guard assistance  Comment: Pt performed STS from bed with use of RW for UE support.   Ambulation  Ambulation?: Yes  Ambulation 1  Surface: level tile  Device: Rolling Walker  Assistance: Contact guard assistance  Quality of Gait: steady, no LOB noted  Gait Deviations: Slow Feroz  Distance: 75ft x2  Comments: Pt amb from bed down the lagos to perform step ups. Pt demo good safety awareness and maintenance of walker positioning t/o. Some dizziness reported during amb which subsided with rest, multiple rest breaks throughout. Stairs/Curb  Stairs?: Yes  Stairs  # Steps : 12  Stairs Height: 6\"  Rails: Bilateral  Device: Rolling walker  Assistance: Contact guard assistance  Comment: Pt performed 6 steps forward and 6 steps retro with RW for BUE support. Cueing provided for feroz with good return. Pt steady throughout, increased pain in RLE during descending steps. Balance  Posture: Fair  Sitting - Static: Good  Sitting - Dynamic: Good  Standing - Static: Good  Standing - Dynamic: Fair;+  Comments: Standing balance assessed with RW, seated balance assessed sitting in chair. Exercises  Seated LE exercise program: Long Arc Quads and heel/toe raises. Reps: x15  Supine Exercises: Gluteal sets, Heel Slides, Quad Sets, SAQ. Reps: x15  Comments: Pt performed sitting exercises in chair and supine exercises in chair with legs elevated. Multiple rest breaks required throughout.      AM-PAC Score  AM-PAC Inpatient Mobility Raw Score : 18 (02/17/22 1043)  AM-PAC Inpatient T-Scale Score : 43.63 (02/17/22 1043)  Mobility Inpatient CMS 0-100% Score: 46.58 (02/17/22 1043)  Mobility Inpatient CMS G-Code Modifier : CK (02/17/22 1043)          Goals  Short term goals  Time Frame for Short term goals: 14 visits  Short term goal 1: independent bed mobility  Short term goal 2: independent transfers  Short term goal 3: independent gait with rwalker x 50'  Short term goal 4: stair ambulation x 2 steps without HR, min A+1  Short term goal 5: independent with HEP for DENIZ protocol and precautions  Patient Goals   Patient goals : decrease pain, be able to walk better    Plan    Plan  Times per week: BID  Times per day: Twice a day  Current Treatment Recommendations: Strengthening,ROM,Balance Training,Functional Mobility Training,Transfer Training,Endurance Training,Gait Training,Stair training,Pain [employer] Exercise Program,Safety Education & Training,Patient/Caregiver Education & Training,Equipment Evaluation, Education, & procurement,Positioning  Safety Devices  Type of devices: Call light within reach,Gait belt,Patient at risk for falls,Nurse notified,Left in chair  Restraints  Initially in place: No     Therapy Time   Individual Concurrent Group Co-treatment   Time In 0829         Time Out 0915         Minutes 46         Timed Code Treatment Minutes: [address]    Treatment performed by Student PTA under the supervision of co-signing PTA who agrees with all treatment and documentation.    Breana Hood, PTA

## 2022-02-17 NOTE — ANESTHESIA POSTPROCEDURE EVALUATION
Department of Anesthesiology  Postprocedure Note    Patient: Karen Lundberg  MRN: [id number]  [place]: 1965  Date of evaluation: 2/17/2022  Time:  3:00 PM     Procedure Summary     Date: 02/14/22 Room / Location: [address]    Anesthesia Start: 1114 Anesthesia Stop: 0026    Procedure: TOTAL HIP ARTHROPLASTY  BEACH & NEPHEW (Right ) Diagnosis: (RIGHT FEMORAL NECK FRACTURE)    Surgeons: Funmi Rodriguez DO Responsible Provider: Ace Borden MD    Anesthesia Type: spinal, regional ASA Status: 2          Anesthesia Type: spinal, regional    He Phase I: He Score: 9    He Phase II:      Last vitals: Reviewed and per EMR flowsheets.        Anesthesia Post Evaluation    Patient location during evaluation: PACU  Patient participation: complete - patient participated  Level of consciousness: awake and alert  Pain score: 1  Airway patency: patent  Nausea & Vomiting: no nausea and no vomiting  Complications: no  Cardiovascular status: hemodynamically stable  Respiratory status: room air  Hydration status: euvolemic

## 2022-02-17 NOTE — FLOWSHEET NOTE
Writer contacted on call attending physician via [employer] at this time to notify of patients blood pressures that are soft. 100/65 (77) on right upper arm and 92/66 (75) on the left upper arm.

## 2022-02-18 VITALS
HEART RATE: 91 BPM | DIASTOLIC BLOOD PRESSURE: 81 MMHG | BODY MASS INDEX: 21.17 KG/M2 | SYSTOLIC BLOOD PRESSURE: 113 MMHG | HEIGHT: 60 IN | TEMPERATURE: 98.2 F | RESPIRATION RATE: 20 BRPM | WEIGHT: 107.8 LBS | OXYGEN SATURATION: 98 %

## 2022-02-18 PROCEDURE — 6370000000 HC RX 637 (ALT 250 FOR IP): Performed by: STUDENT IN AN ORGANIZED HEALTH CARE EDUCATION/TRAINING PROGRAM

## 2022-02-18 PROCEDURE — 6360000002 HC RX W HCPCS: Performed by: NURSE PRACTITIONER

## 2022-02-18 PROCEDURE — 97110 THERAPEUTIC EXERCISES: CPT

## 2022-02-18 PROCEDURE — 97116 GAIT TRAINING THERAPY: CPT

## 2022-02-18 RX ORDER — OXYCODONE HYDROCHLORIDE 5 MG/1
5 TABLET ORAL EVERY 6 HOURS PRN
Status: DISCONTINUED | OUTPATIENT
Start: 2022-02-18 | End: 2022-02-18 | Stop reason: HOSPADM

## 2022-02-18 RX ORDER — PANTOPRAZOLE SODIUM 40 MG/1
40 TABLET, DELAYED RELEASE ORAL
Status: DISCONTINUED | OUTPATIENT
Start: 2022-02-18 | End: 2022-02-18 | Stop reason: HOSPADM

## 2022-02-18 RX ADMIN — PANTOPRAZOLE SODIUM 40 MG: 40 TABLET, DELAYED RELEASE ORAL at 09:11

## 2022-02-18 RX ADMIN — POLYETHYLENE GLYCOL 3350 17 G: 17 POWDER, FOR SOLUTION ORAL at 09:11

## 2022-02-18 RX ADMIN — ACETAMINOPHEN 1000 MG: 500 TABLET ORAL at 05:33

## 2022-02-18 RX ADMIN — GABAPENTIN 300 MG: 600 TABLET ORAL at 08:57

## 2022-02-18 RX ADMIN — OXYCODONE 5 MG: 5 TABLET ORAL at 09:00

## 2022-02-18 RX ADMIN — IBUPROFEN 400 MG: 400 TABLET, FILM COATED ORAL at 05:33

## 2022-02-18 RX ADMIN — PHENOBARBITAL 64.8 MG: 64.8 TABLET ORAL at 08:57

## 2022-02-18 RX ADMIN — OXYCODONE 5 MG: 5 TABLET ORAL at 02:57

## 2022-02-18 RX ADMIN — ENOXAPARIN SODIUM 30 MG: 100 INJECTION SUBCUTANEOUS at 08:58

## 2022-02-18 RX ADMIN — METHOCARBAMOL TABLETS 750 MG: 500 TABLET, COATED ORAL at 08:57

## 2022-02-18 RX ADMIN — ALPRAZOLAM 1 MG: 1 TABLET ORAL at 03:00

## 2022-02-18 ASSESSMENT — PAIN DESCRIPTION - PROGRESSION: CLINICAL_PROGRESSION: NOT CHANGED

## 2022-02-18 ASSESSMENT — PAIN DESCRIPTION - DESCRIPTORS
DESCRIPTORS: DISCOMFORT
DESCRIPTORS: THROBBING;DISCOMFORT

## 2022-02-18 ASSESSMENT — PAIN DESCRIPTION - ONSET: ONSET: ON-GOING

## 2022-02-18 ASSESSMENT — PAIN DESCRIPTION - LOCATION
LOCATION: RIB CAGE;HIP
LOCATION: HIP

## 2022-02-18 ASSESSMENT — PAIN DESCRIPTION - FREQUENCY: FREQUENCY: CONTINUOUS

## 2022-02-18 ASSESSMENT — PAIN SCALES - GENERAL
PAINLEVEL_OUTOF10: 7
PAINLEVEL_OUTOF10: 7
PAINLEVEL_OUTOF10: 6
PAINLEVEL_OUTOF10: 7
PAINLEVEL_OUTOF10: 7

## 2022-02-18 ASSESSMENT — PAIN DESCRIPTION - PAIN TYPE
TYPE: ACUTE PAIN
TYPE: ACUTE PAIN

## 2022-02-18 ASSESSMENT — PAIN DESCRIPTION - ORIENTATION
ORIENTATION: RIGHT
ORIENTATION: RIGHT

## 2022-02-18 ASSESSMENT — PAIN - FUNCTIONAL ASSESSMENT: PAIN_FUNCTIONAL_ASSESSMENT: PREVENTS OR INTERFERES SOME ACTIVE ACTIVITIES AND ADLS

## 2022-02-18 NOTE — H&P
This note was copied from the consult to change the note type to H&P                     TRAUMA HISTORY AND PHYSICAL EXAMINATION    PATIENT NAME: Yaima Cook  YOB: 1965  MEDICAL RECORD NO. [id number]   DATE: 2/18/2022  PRIMARY CARE PHYSICIAN: Sarita Bravo MD  PATIENT EVALUATED AT THE REQUEST OF : Ofleia AQUINO   []Trauma Alert     [] Trauma Priority     [x]Trauma Consult. IMPRESSION:     Patient Active Problem List   Diagnosis    Migraine without aura and without status migrainosus, not intractable    Generalized anxiety disorder    Chronic bilateral low back pain without sciatica    Primary osteoarthritis involving multiple joints    Seizure disorder (HCC)    Vestibular hypofunction of both ears    S/P total knee arthroplasty    Migraine    Familial hypercholesterolemia    Chronic vertigo    Anxiety    Closed fracture of neck of right femur [facility])       MEDICAL DECISION MAKING AND PLAN:     Right transcervical femoral neck fracture  ED to obtain CT abd/pelv  If no emergent surgical need from CT, patient to be transferred to established orthopedic surgery team at [facility] from [address] [place] [address]    [] Neurosurgery     [x] Orthopedic Surgery    [] Cardiothoracic     [] Facial Trauma    [] Plastic Surgery (Burn)    [] Pediatric Surgery     [] Internal Medicine    [] Pulmonary Medicine    [] Other:        HISTORY:   INJURY SUMMARY  Right transcervical femoral neck fracture    GENERAL DATA  Age 61 y.o.  female   Patient information was obtained from patient. History/Exam limitations: none.   Patient presented to the Emergency Department by ambulance  Injury Date: 2/18/2022         Transport mode:   [x]Ambulance      [] Helicopter     []Car       [] Other    INJURY LOCATION, (e.g., home, farm, industry, street)  Specific Details of Location (e.g., bedroom, kitchen, garage): home    MECHANISM OF INJURY        [x] Fall    [x]From Standing     []From Height  Ft []Down Stairs ___steps    HISTORY:     Brenda Rendon is a 61 y.o. female that presented to the Emergency Department following a fall at home. Patient was taking down cricket decoration when her right foot got caught in the cuff of her left pant leg. This resulted in patient falling to the ground on her hardwood floor on her right side. Patient experiencing pain over right hip, right ribs, and right shoulder. Patient denies hitting her head, denies LOC, denies blood thinners. Patient has extensive orthopedic surgery history including bilateral knee replacements. Imaging in the ED revealed Right transcervical femoral neck fracture. Patient states that all of her orthopedic surgeries were done at [facility] and expresses the desire to be transferred there for her surgery. Patient has seizure history treated with phenobarbital. Patient denies chest pain, shortness of breath. Denies tobacco, alcohol, and drug use. Loss of Consciousness [x]No   []Yes Duration(min)       [] Unknown       MEDICATIONS:   []  None     []  Information not available due to exam limitations documented above  Prior to Admission medications    Medication Sig Start Date End Date Taking? Authorizing Provider   enoxaparin (LOVENOX) 30 MG/0.3ML injection Inject 0.3 mLs into the skin daily 2/17/22 3/24/22 Yes ZUHAIR Sierra CNP   PHENobarbital (LUMINAL) 64.8 MG tablet Take 1 tablet by mouth 2 times daily for 7 days. 2/16/22 2/23/22 Yes ZUHAIR Sierra CNP   oxyCODONE (ROXICODONE) 5 MG immediate release tablet Take 1 tablet by mouth every 8 hours as needed for Pain for up to 3 days. 2/16/22 2/19/22 Yes ZUHAIR Sierra CNP   polyethylene glycol (GLYCOLAX) 17 g packet Take 17 g by mouth daily for 7 days 2/17/22 2/24/22 Yes ZUHAIR Sierra CNP   PHENobarbital (LUMINAL) 32.4 MG tablet Take 64.8 mg by mouth 2 times daily.    Yes Historical Provider, MD   ALPRAZolam (XANAX) 1 MG tablet TAKE 1 TABLET BY MOUTH TWICE DAILY AS NEEDED 1/31/22 3/2/22 Yes Rodney Denis MD   ibuprofen (ADVIL;MOTRIN) 800 MG tablet TK 1 T PO TID PRN 21  Yes Rodney Denis MD       ALLERGIES:   []  None    []   Information not available due to exam limitations documented above   Carbamazepine, Metoclopramide, Phenytoin sodium extended, Topiramate, Verapamil, Sulfamethoxazole-trimethoprim, and Amoxicillin    PAST MEDICAL HISTORY: []  None   []   Information not available due to exam limitations documented above    has a past medical history of Migraines and Seizures ([facility] [address] has a past surgical history that includes shoulder surgery (Left, ); Mandible fracture surgery;  section; Hysterectomy, total abdominal; Total knee arthroplasty (Right, 2019); Total knee arthroplasty (Left, 2017); Cholecystectomy, laparoscopic (); Hip Arthroplasty (Right, 2022); and hip surgery (Right, 2022). FAMILY HISTORY   []   Information not available due to exam limitations documented above    family history includes Cancer in her brother, maternal grandmother, and mother; Heart Disease in her mother. SOCIAL HISTORY  []   Information not available due to exam limitations documented above     reports that she has never smoked. She has never used smokeless tobacco.   reports current alcohol use. reports no history of drug use.     PERTINENT SYSTEMIC REVIEW:    []   Information not available due to exam limitations documented above    GEN: no malaise, fatigue  NEURO: no headaches, weakness, parasthesias  HEENT: no vision changes or facial trauma  CVS: no chest pain or palpitations  PULM: no cough, dyspnea or wheezing  GI: no nausea, vomiting or abdominal pain  MUSK: no neck or back pain  HEME: no anticoagulation or easy bruising       PHYSICAL EXAMINATION:     GLASCOW COMA SCALE  NEUROMUSCULAR BLOCKADE PRIOR TO ARRIVAL     [x]No        []Yes      Variable  Score   Variable  Score  Eye opening [x]Spontaneous 4 Verbal [x]Oriented  5     []To voice  3   []Confused  4    []To pain  2   []Inapp words  3    []None  1   []Incomp words 2       []None  1   Motor   [x]Obeys  6    []Localizes pain 5    []Withdraws(pain) 4    []Flexion(pain) 3  []Extension(pain) 2    []None  1     GCS Total = 15    PHYSICAL EXAMINATION    VITAL SIGNS:   Vitals:    02/18/22 0903   BP: 113/81   Pulse: 91   Resp: 20   Temp:    SpO2:        General Appearance: AAOx3, conversant, in mild distress due to pain  Skin: warm and dry, no rash or erythema   Head: normocephalic and atraumatic   Eyes: PERRLA, EOMI  Ears: tympanic membrane clear bilaterally, external ear canals wnl  Nose: nose without deformity  Neck: collared, no cervical tenderness  Back: no abrasion, step offs, or thoraco-lumbar tenderness  Pulmonary/Chest: CTAB, good air entry bilaterally  Cardiovascular: normal rate, regular rhythm  Abdomen: soft, non-tender, non-distended   Pelvic: normal external genitalia, no perineal bruising or swelling  Extremities: Tender to palpation over right hip. no cyanosis, edema or gross deformity  Neurologic: moving all extremities, 5/5 strength throughout     FOCUSED ABDOMINAL SONOGRAM FOR TRAUMA (FAST): A good  quality examination was performed by Dr. Yamzin Esteves and representative images were obtained. [x] No free fluid in the abdomen         RADIOLOGY    No results found.     LABS    Labs Reviewed   CBC WITH AUTO DIFFERENTIAL - Abnormal; Notable for the following components:       Result Value    Immature Granulocytes 1 (*)     All other components within normal limits   BASIC METABOLIC PANEL - Abnormal; Notable for the following components:    Glucose 101 (*)     CREATININE 0.44 (*)     All other components within normal limits   TOX SCR, BLD, ED - Abnormal; Notable for the following components:    Acetaminophen Level <5 (*)     Salicylate Lvl <1 (*)     All other components within normal limits   CBC WITH AUTO DIFFERENTIAL - Abnormal; Notable for the following components:    Seg Neutrophils 69 (*)     Lymphocytes 23 (*)     All other components within normal limits   BASIC METABOLIC PANEL - Abnormal; Notable for the following components:    Glucose 105 (*)     CREATININE 0.35 (*)     Anion Gap 7 (*)     All other components within normal limits   BASIC METABOLIC PANEL - Abnormal; Notable for the following components:    Glucose 102 (*)     CREATININE 0.41 (*)     Calcium 8.3 (*)     Anion Gap 7 (*)     All other components within normal limits   CBC WITH AUTO DIFFERENTIAL - Abnormal; Notable for the following components:    RBC 3.44 (*)     Hemoglobin 10.1 (*)     Hematocrit 31.0 (*)     Seg Neutrophils 70 (*)     Lymphocytes 21 (*)     All other components within normal limits   HEMOGLOBIN AND HEMATOCRIT - Abnormal; Notable for the following components:    Hemoglobin 9.0 (*)     Hematocrit 26.9 (*)     All other components within normal limits   COVID-19, RAPID   APTT   PROTIME-INR   VITAMIN D 25 HYDROXY         Ant Lynn,   2/13/22, 3:46 PM            Trauma Attending Attestation      I have reviewed the above GCS note(s) and confirmed the key elements of the medical history and physical exam. I have seen and examined the pt. I have discussed the findings, established the care plan and recommendations with Resident, GCS RN, bedside nurse.         Ant Lynn DO  2/18/2022  3:46 PM

## 2022-02-18 NOTE — CARE COORDINATION
SBIRT- Completed and screenings were negative          Alcohol Screening and Brief Intervention        Recent Labs     02/13/22  1914   ALC <10       Alcohol Pre-screening     (WOMEN ONLY) How many times in the past year have you had 4 or more drinks in a day?: None    Alcohol Screening Audit       Drug Pre-Screening   How many times in the past year have you used a recreational drug or used a prescription medication for nonmedical reasons?: None    Drug Screening DAST       Mood Pre-Screening (PHQ-2)  During the past two weeks, have you been bothered by little interest or pleasure in doing things?: No  During the past two weeks, have you been bothered by feeling down, depressed, or hopeless?: No    Mood Pre-Screening (PHQ-9)         I have interviewed Razia Clemonskamla, [id number] regarding  Her alcohol consumption/drug use and risk for excessive use. Screenings were negative. Patient  N/A intervention at this time.      Deferred []    Completed on: 2/14/2022   18[facility]
Transition planning  Spoke with patient, she does not want to go to acute rehab, wants to go to SNF. Patient given SNF list as well as list of SNFs that are in network with [facility].  She chooses (in preference order) :  [address]  2. [facility]  3. [facility]    E-referrals sent to [facility] and [facility]. 1400 Call from [place] at [facility], states they do not have a contract with [facility].  E-referral sent to [facility]. [phone] and spoke with Ana at [facility], she states they do not take [facility].  1540 Received call from [facility] at [facility], she states they do not have a bed available and do will not have one until late next week. 1600 Updated patient on above, she would like a referral to [facility]. E-referral sent to [facility].
Transitional planning:  Called [phone] and left message with Sam Osorio liaison to call [facility] back regarding referral.   Vicci Fret [phone] and asked for admissions. Left message with Danyell to call [facility] back. 1600 [facility] called back from [facility] and will see the pt today at 0930 They have bed availability. 8228 Reviewed chart, do not see Jessie Barillas Linker team inquiring about H & P.     2786 Met with pt at bedside and notified of liaison visit. Also, given [facility], [facility]. choice list if pt not accepted to ARU. She does not have transportation to and from therapy, if needed. Transportation packet started and left on burn unit in medical chart slot. Pt said she will go home if she is not accepted to ARU.     1300 Received vm from Sam Nascimento at [facility], accepted and sent for precert. Awaiting auth. 501 [facility] called from [facility], can accept tomorrow morning. Needs transportation set up.
Transitional planning:  Received call from Zohreh Ghotra, liaison at [facility] to set up transportation today. Pt accepted. 7010 Faxed demographic, medical necessity and transportation form to [facility] life flight network. 1170 [facility], can transport at 10:30 am  Notified unit nurse. Met with pt at bedside and notified her. Nodded her head. Left vm with Zohreh Ghotra, after attempting to reach 3 times. Left message pt transport at 10:30 am. Requesting nurse report number. Gave unit main number to call report: [phone]. Completed AYSE/AVS, need RN signature. 5679 Called unit to ask for above. 1026 AYSE/AVS completed. Printed and placed in transportation packet.
is agreeable to rehab - HHC, outpt therapy, or SNF if needed. 1452 - [employer] to Geneva Coppola NP, pt may benefit from a PMR consult.           Electronically signed by John Yeager RN on 2/15/22 at 9:02 AM EST

## 2022-02-18 NOTE — PROGRESS NOTES
Physical Therapy  Facility/Department: [address] BURN UNIT  Daily Treatment Note  NAME: Sharia Severance  : 1965  MRN: [id number]    Date of Service: 2022    Discharge Recommendations:  Patient would benefit from continued therapy after discharge   PT Equipment Recommendations  Equipment Needed: No (Pt states she owns a RW from previous surgeries.)    Assessment   Body structures, Functions, Activity limitations: Decreased functional mobility ; Decreased ROM; Decreased strength;Decreased balance; Increased pain;Decreased posture  Assessment: Pt amb 100ft x2 with RW CGA requiring multiple standing rest breaks d/t 7/10 pain in ribs. Demo good awareness of precautions throughout. Pt would benefit from continued PT to address strength/endurance deficits and improve functional independence. Prognosis: Good  PT Education: Goals;PT Role;Plan of Care;Home Exercise Program;Precautions;Transfer Training;Energy Conservation;General Safety;Weight-bearing Education;Gait Training;Disease Specific Education; Functional Mobility Training; Injury Prevention  REQUIRES PT FOLLOW UP: Yes  Activity Tolerance  Activity Tolerance: Patient limited by pain; Patient Tolerated treatment well;Patient limited by endurance  Activity Tolerance: Pain ribs and hip throughout requiring multiple rest breaks. Deferred further exercises d/t pain. Patient Diagnosis(es): The primary encounter diagnosis was Closed fracture of neck of right femur, initial encounter ([facility] [address] Diagnoses of Fall from standing, initial encounter and Seizure disorder [facility]) were also pertinent to this visit. has a past medical history of Migraines and Seizures ([facility] [address] has a past surgical history that includes shoulder surgery (Left, ); Mandible fracture surgery;  section; Hysterectomy, total abdominal; Total knee arthroplasty (Right, 2019); Total knee arthroplasty (Left, 2017); Cholecystectomy, laparoscopic ();  Hip Arthroplasty (Right, 02/14/2022); and hip surgery (Right, 2/14/2022). Restrictions  Restrictions/Precautions  Restrictions/Precautions: Surgical Protocols,Fall Risk,Up as Tolerated,Weight Bearing  Required Braces or Orthoses?: No  Lower Extremity Weight Bearing Restrictions  Right Lower Extremity Weight Bearing: Weight Bearing As Tolerated  Position Activity Restriction  Hip Precautions: No hip flexion > 90 degrees,No active ABduction,No ADduction,No hip internal rotation,No hip external rotation  Other position/activity restrictions: Right hip anterior precautions. Avoid excessive extension, external rotation, and abduction maneuvers. Avoid crossing over of legs. Subjective   General  Chart Reviewed: Yes  Response To Previous Treatment: Patient with no complaints from previous session. Family / Caregiver Present: No  Subjective  Subjective: RN and pt agreeable to PT. Pt arousable in bed upon arrival, pleasant and cooperative throughout. Pt c/o if 7/10 pain in R ribs. General Comment  Comments: RN present upon exit. Pain Screening  Patient Currently in Pain: Yes  Pain Assessment  Pain Assessment: 0-10  Pain Level: 7  Pain Type: Acute pain  Pain Location: Rib cage; Hip  Pain Orientation: Right  Pain Descriptors: Throbbing;Discomfort  Vital Signs  Patient Currently in Pain: Yes       Orientation  Orientation  Overall Orientation Status: Within Normal Limits  Cognition   Cognition  Overall Cognitive Status: WFL  Objective   Bed mobility  Supine to Sit: Minimal assistance  Sit to Supine: Minimal assistance  Comment: Pt supine in bed upon entry/exit this date. Requires Usman for RLE progression during supine <-> sit transfers. Transfers  Sit to Stand: Contact guard assistance  Stand to sit: Contact guard assistance  Bed to Chair: Unable to assess  Comment: Pt performed STS from bed with use of RW for UE support.   Ambulation  Ambulation?: Yes  Ambulation 1  Surface: level tile  Device: Rolling Walker  Assistance: Contact guard assistance  Quality of Gait: steady, no LOB noted, antalgic  Gait Deviations: Slow Karen  Distance: 100ft x2  Comments: Pt amb from bed to end of hallway with RW, CGA, and multiple standing rest breaks. Pt reporting moderate pain 7/10 in ribs throughout. Stairs/Curb  Stairs?: No     Balance  Posture: Fair  Sitting - Static: Good  Sitting - Dynamic: Good  Standing - Static: Good  Standing - Dynamic: Fair;+  Comments: Standing balance assessed with RW, seated balance assessed sitting EOB. Exercises  Knee Long Arc Quad: x10  Supine Exercises: Ankle Pumps, Gluteal sets,, Quad Sets, SLR. Reps: x10  Comments: Exercises performed while seated EOB and supine in bed d/t pain in ribs. Multiple rest breaks throughout. Deferred further exercises d/t pain.       AM-PAC Score  AM-PAC Inpatient Mobility Raw Score : 18 (02/18/22 1039)  AM-PAC Inpatient T-Scale Score : 43.63 (02/18/22 1039)  Mobility Inpatient CMS 0-100% Score: 46.58 (02/18/22 1039)  Mobility Inpatient CMS G-Code Modifier : CK (02/18/22 1039)          Goals  Short term goals  Time Frame for Short term goals: 14 visits  Short term goal 1: independent bed mobility  Short term goal 2: independent transfers  Short term goal 3: independent gait with rwalker x 50'  Short term goal 4: stair ambulation x 2 steps without HR, min A+1  Short term goal 5: independent with HEP for DENIZ protocol and precautions  Patient Goals   Patient goals : decrease pain, be able to walk better    Plan    Plan  Times per week: BID  Times per day: Twice a day  Current Treatment Recommendations: Strengthening,ROM,Balance Training,Functional Mobility Training,Transfer Training,Endurance Training,Gait Training,Stair training,Pain Management,Home Exercise Program,Safety Education & Training,Patient/Caregiver Education & Training,Equipment Evaluation, Education, & procurement,Positioning  Safety Devices  Type of devices: Call light within reach,Gait belt,Patient at risk for falls,Nurse notified,Left in bed  Restraints  Initially in place: No     Therapy Time   Individual Concurrent Group Co-treatment   Time In 0831         Time Out 0856         Minutes 25         Timed Code Treatment Minutes: [facility]    Treatment performed by Student PTA under the supervision of co-signing PTA who agrees with all treatment and documentation.    Jayne De La Cruz PTA

## 2022-02-18 NOTE — PLAN OF CARE
Problem: Skin Integrity:  Goal: Will show no infection signs and symptoms  Description: Will show no infection signs and symptoms  2/17/2022 2018 by Lisbeth Loco RN  Outcome: Ongoing  2/17/2022 1828 by Sariah Foley RN  Outcome: Ongoing  2/17/2022 1828 by Sariah Foley RN  Outcome: Ongoing  Goal: Absence of new skin breakdown  Description: Absence of new skin breakdown  2/17/2022 2018 by Lisbeth Loco RN  Outcome: Ongoing  2/17/2022 1828 by Sariah Foley RN  Outcome: Ongoing  2/17/2022 1828 by Sariah Foley RN  Outcome: Ongoing     Problem: Falls - Risk of:  Goal: Will remain free from falls  Description: Will remain free from falls  2/17/2022 2018 by Lisbeth Loco RN  Outcome: Ongoing  2/17/2022 1828 by Sariah Foley RN  Outcome: Ongoing  2/17/2022 1828 by Sariah Foley RN  Outcome: Ongoing  Goal: Absence of physical injury  Description: Absence of physical injury  2/17/2022 2018 by Lisbeth Loco RN  Outcome: Ongoing  2/17/2022 1828 by Sariah Foley RN  Outcome: Ongoing  2/17/2022 1828 by Sariah Foley RN  Outcome: Ongoing     Problem: Pain:  Goal: Pain level will decrease  Description: Pain level will decrease  2/17/2022 2018 by Lisbeth Loco RN  Outcome: Ongoing  2/17/2022 1828 by Sariah Foley RN  Outcome: Ongoing  2/17/2022 1828 by Sariah Foley RN  Outcome: Ongoing  Goal: Control of acute pain  Description: Control of acute pain  2/17/2022 2018 by Lisbeth Loco RN  Outcome: Ongoing  2/17/2022 1828 by Sariah Foley RN  Outcome: Ongoing  2/17/2022 1828 by Sariah Foley RN  Outcome: Ongoing  Goal: Control of chronic pain  Description: Control of chronic pain  2/17/2022 2018 by Lisbeth Loco RN  Outcome: Ongoing  2/17/2022 1828 by Sariah Foley RN  Outcome: Ongoing  2/17/2022 1828 by Sariah Foley RN  Outcome: Ongoing     Problem: Musculor/Skeletal Functional Status  Goal: Highest potential functional level  2/17/2022 2018 by Lisbeth Loco RN  Outcome: Ongoing  2/17/2022 1828 by Adriana Sun RN  Outcome: Ongoing  2/17/2022 1828 by Adriana Sun RN  Outcome: Ongoing  Goal: Absence of falls  2/17/2022 2018 by Yoli Rice RN  Outcome: Ongoing  2/17/2022 1828 by Adriana Sun RN  Outcome: Ongoing  2/17/2022 1828 by Adriana Sun RN  Outcome: Ongoing

## 2022-02-18 NOTE — PLAN OF CARE
Problem: Skin Integrity:  Goal: Will show no infection signs and symptoms  Description: Will show no infection signs and symptoms  Outcome: Completed  Goal: Absence of new skin breakdown  Description: Absence of new skin breakdown  Outcome: Completed     Problem: Falls - Risk of:  Goal: Will remain free from falls  Description: Will remain free from falls  Outcome: Completed  Goal: Absence of physical injury  Description: Absence of physical injury  Outcome: Completed     Problem: Pain:  Goal: Pain level will decrease  Description: Pain level will decrease  Outcome: Completed  Goal: Control of acute pain  Description: Control of acute pain  Outcome: Completed  Goal: Control of chronic pain  Description: Control of chronic pain  Outcome: Completed     Problem: Musculor/Skeletal Functional Status  Goal: Highest potential functional level  Outcome: Completed  Goal: Absence of falls  Outcome: Completed

## 2022-02-18 NOTE — PROGRESS NOTES
PROGRESS NOTE    PATIENT NAME: Larisa Jenkins  MEDICAL RECORD NO. [id number]  DATE: 2022  PRIMARY CARE PHYSICIAN: Lizzie Lynch MD    HD: # 4    ASSESSMENT    Patient Active Problem List   Diagnosis    Migraine without aura and without status migrainosus, not intractable    Generalized anxiety disorder    Chronic bilateral low back pain without sciatica    Primary osteoarthritis involving multiple joints    Seizure disorder (HCC)    Vestibular hypofunction of both ears    S/P total knee arthroplasty    Migraine    Familial hypercholesterolemia    Chronic vertigo    Anxiety    Closed fracture of neck of right femur [facility])     MEDICAL DECISION MAKING AND PLAN    Right femoral neck fracture s/p right hip arthroplasty ()              -Encourage PT/OT              -WBAT RLE               -Continue multimodal pain management, limit opioids as able              -F/u Dr. Norma Blancas                -Lovenox x 5 weeks after dc     Urinary retention, resolved   DVT ppx: lovenox   Home phenobarb resumed for seizures   Reg diet   Dispo to rehab pending       Chief Complaint: Who had a [facility] and sustained a right femoral neck fracture s/p right total hip arthroplasty on     SUBJECTIVE    Conchetta Poor Hank Patient seen and examined. VSS. Working well with therapy. Tolerating a diet, no nausea or emesis. +flatus.      OBJECTIVE  VITALS: Temp: Temp: 98.2 °F (36.8 °C)Temp  Av.5 °F (36.9 °C)  Min: 98.2 °F (36.8 °C)  Max: 98.7 °F (87.4 °C) BP Systolic (51UNT), SIE:835 , Min:102 , WPQ:752   Diastolic (48GPT), ALD:31, Min:67, Max:81   Pulse Pulse  Av.3  Min: 77  Max: 96 Resp Resp  Av.2  Min: 15  Max: 20 Pulse ox SpO2  Av %  Min: 96 %  Max: 98 %  GENERAL: alert, no distress   HEENT: atraumatic, normocephalic  LUNGS: normal effort on RA, no respiratory distress, no accessory muscle use   HEART: regular rate  ABDOMEN: soft, nondistended, no guarding or peritoneal signs EXTREMITY: RLE: Optifoam dressing on, c/d/i. No sign of strikethrough bleeding. Appropriate tenderness about the hip and thigh. Compartments soft and compressible. Palpable distal pulses. I/O last 3 completed shifts: In: 2240.3 [P.O.:1740; I.V.:500.3]  Out: 2950 [Urine:2950]    Drain/tube output: In: 1500 [P.O.:1500]  Out: 2000 [Urine:2000]    LAB:  CBC:   Recent Labs     02/16/22  0843   HGB 9.0*   HCT 26.9*     BMP:   No results for input(s): NA, K, CL, CO2, BUN, CREATININE, GLUCOSE in the last 72 hours. COAGS: No results for input(s): APTT, PROT, INR in the last 72 hours.     RADIOLOGY:  No new imaging       Charmaine Moctezuma DO

## 2022-02-18 NOTE — PROGRESS NOTES
Pt left unit via wheelchair with [facility] transport. Pt left with all belongings and RN answered all questions in full.

## 2022-02-21 NOTE — DISCHARGE SUMMARY
DISCHARGE SUMMARY:    PATIENT NAME:  Sydnee Palmer  YOB: 1965  MEDICAL RECORD NO. [id number]  DATE: 02/21/22  PRIMARY CARE PHYSICIAN: Valorie Krabbe, MD  ADMIT DATE:  2/13/2022    DISCHARGE DATE:  2/18/2022  DISPOSITION:  facility  ADMITTING DIAGNOSIS:   FFS    DIAGNOSIS:   Patient Active Problem List   Diagnosis    Migraine without aura and without status migrainosus, not intractable    Generalized anxiety disorder    Chronic bilateral low back pain without sciatica    Primary osteoarthritis involving multiple joints    Seizure disorder (HCC)    Vestibular hypofunction of both ears    S/P total knee arthroplasty    Migraine    Familial hypercholesterolemia    Chronic vertigo    Anxiety    Closed fracture of neck of right femur ([facility] [address]       CONSULTANTS:  orthopedics    PROCEDURES:   Procedure Summary      Date: 02/14/22 Room / Location: [address]     Anesthesia Start: 1114 Anesthesia Stop: 1412     Procedure: TOTAL HIP ARTHROPLASTY  BEACH & NEPHEW (Right ) Diagnosis: (RIGHT FEMORAL NECK FRACTURE)     Surgeons: Renata Azar DO Responsible Provider: Yi Becker MD     Anesthesia Type: spinal, regional ASA Status: [address] [facility] [address]:   Sydnee Palmer is a 61 y.o. female who was admitted on 2/13/2022  Hospital Course:  Right femoral neck fracture s/p right hip arthroplasty (2/14)              -Encourage PT/OT              -WBAT RLE               -Continue multimodal pain management, limit opioids as able              -F/u Dr. Kasie Sánchez 2/28               -Lovenox x 5 weeks after dc     Urinary retention, resolved   DVT ppx: lovenox   Home phenobarb resumed for seizures   Reg diet   Dispo to rehab pending         Chief Complaint: Who had a [facility] and sustained a right femoral neck fracture s/p right total hip arthroplasty on 2/14     SUBJECTIVE     Stephani Mckinney Patient seen and examined. VSS. Working well with therapy.  Tolerating a diet, on, c/d/i. No sign of strikethrough bleeding. Appropriate tenderness about the hip and thigh. Compartments soft and compressible. Palpable distal pulses. LABS:   No results for input(s): WBC, HGB, HCT, PLT, NA, K, CL, CO2, BUN, CREATININE in the last 72 hours. DIAGNOSTIC TESTS:    XR CHEST (SINGLE VIEW FRONTAL)    Result Date: 2/14/2022  EXAMINATION: ONE XRAY VIEW OF THE CHEST 2/14/2022 12:54 am COMPARISON: Chest radiograph dated February 13, 2022 HISTORY: ORDERING SYSTEM PROVIDED HISTORY: pre op clearance TECHNOLOGIST PROVIDED HISTORY: pre op clearance Reason for Exam: uprt FINDINGS: The lungs are without acute focal process. There is no effusion or pneumothorax. The cardiomediastinal silhouette is without acute process. The osseous structures are without acute process. No acute process. XR PELVIS (1-2 VIEWS)    Result Date: 2/14/2022  EXAMINATION: ONE XRAY VIEW OF THE PELVIS 2/14/2022 12:52 am COMPARISON: Current x-rays of the right hip HISTORY: ORDERING SYSTEM PROVIDED HISTORY: Surgical planning TECHNOLOGIST PROVIDED HISTORY: Low AP pelvis with marker ball to use for templating. Thank you Surgical planning FINDINGS: Contrast is present within the distended urinary bladder obscuring portions of the lower sacrum and the coccyx. The bones are osteopenic. There no displacement is appreciated along the pelvic ring. The left femoral head and neck appear acceptable. There is a fracture deformity of the right femoral neck with impaction/overlap of the fracture site. The right trochanters are not avulsed. Fracture of the right femoral neck is again noted. No dislocation of the right femoral head. Osteopenia of the bones and contrast filled bladder over the central pelvis. No displacement is seen of the pelvic ring. XR FEMUR RIGHT (MIN 2 VIEWS)    Result Date: 2/13/2022  EXAMINATION: XRAY VIEWS OF THE RIGHT FEMUR 2/13/2022 3:43 pm COMPARISON: None.  HISTORY: ORDERING SYSTEM PROVIDED HISTORY: s/p fall TECHNOLOGIST PROVIDED HISTORY: s/p fall FINDINGS: FINDINGS: Nondisplaced subcapital fracture of the right femoral neck. .  There is no evidence of  dislocation. Right knee prosthesis with adequate alignment. . The remaining joint spaces appear well maintained. The soft tissues are unremarkable. Nondisplaced subcapital fracture of the right femoral neck. XR CHEST PORTABLE    Result Date: 2/13/2022  EXAMINATION: ONE XRAY VIEW OF THE CHEST 2/13/2022 3:43 pm COMPARISON: 02/12/2019 HISTORY: ORDERING SYSTEM PROVIDED HISTORY: rib pain, s/p fall standing, pneumo? pulm contusion? TECHNOLOGIST PROVIDED HISTORY: rib pain, s/p fall standing, pneumo? pulm contusion? FINDINGS: The lungs are without acute focal process. There is no effusion or pneumothorax. The cardiomediastinal silhouette is stable. The osseous structures are stable. No acute process. FLUORO FOR SURGICAL PROCEDURES    Result Date: 2/14/2022  Radiology exam is complete. No Radiologist dictation. Please follow up with ordering provider. CT CHEST ABDOMEN PELVIS W CONTRAST    Result Date: 2/13/2022  EXAMINATION: CT OF THE CHEST, ABDOMEN, AND PELVIS WITH CONTRAST 2/13/2022 8:15 pm TECHNIQUE: CT of the chest, abdomen and pelvis was performed with the administration of intravenous contrast. Multiplanar reformatted images are provided for review. Dose modulation, iterative reconstruction, and/or weight based adjustment of the mA/kV was utilized to reduce the radiation dose to as low as reasonably achievable.  COMPARISON: None HISTORY: ORDERING SYSTEM PROVIDED HISTORY: fall, standing, rib pain, femoral neck fx on xray - TECHNOLOGIST PROVIDED HISTORY: fall, standing, rib pain, femoral neck fx on xray - Decision Support Exception - unselect if not a suspected or confirmed emergency medical condition->Emergency Medical Condition (MA) Reason for Exam: fall,rib pain, femoral neck fx FINDINGS: Chest: Mediastinum: The heart and great vessels are within normal limits. No pericardial effusion. No significantly enlarged lymph nodes. Lungs/pleura: The bilateral lungs are clear without evidence of pulmonary mass or maribeth consolidation. No pulmonary nodules. No pleural effusion. No pneumothorax. Soft Tissues/Bones: Within normal limits. Abdomen/Pelvis: Organs: Status post cholecystectomy. The liver, spleen, pancreas, bilateral adrenal glands, and bilateral kidneys are within normal limits. No renal cysts or masses are noted. No hydronephrosis or hydroureter. GI/Bowel: The small and large bowel demonstrate normal caliber and appearance. Pelvis: The urinary bladder is partially filled and is within normal limits. Status post hysterectomy. Peritoneum/Retroperitoneum: No free fluid or free air in the abdomen or pelvis. No significantly enlarged lymph nodes. The abdominal aorta demonstrates normal caliber and appearance. Bones/Soft Tissues: Mildly offset acute right femoral neck fracture. Mildly offset acute right femoral neck fracture. Status post cholecystectomy and hysterectomy. XR HIP 2-3 VW W PELVIS RIGHT    Result Date: 2/14/2022  EXAMINATION: ONE XRAY VIEW OF THE PELVIS AND TWO XRAY VIEWS RIGHT HIP 2/14/2022 2:29 pm COMPARISON: 02/14/2022 HISTORY: ORDERING SYSTEM PROVIDED HISTORY: S/p R DENIZ. PACU please TECHNOLOGIST PROVIDED HISTORY: AP and cross-table lateral of the right hip please with low AP pelvis view, thank you S/p R DENIZ. PACU please FINDINGS: A right total hip arthroplasty has been performed. There is no fracture or malalignment identified. Soft tissue changes consistent with recent surgery are demonstrated. Patterson catheter present. Expected postoperative findings status post right hip arthroplasty. XR HIP 2-3 VW W PELVIS RIGHT    Result Date: 2/13/2022  EXAMINATION: ONE XRAY VIEW OF THE PELVIS AND TWO XRAY VIEWS RIGHT HIP 2/13/2022 3:43 pm COMPARISON: None.  HISTORY: ORDERING SYSTEM PROVIDED HISTORY: s/p fall standing TECHNOLOGIST PROVIDED HISTORY: s/p fall standing FINDINGS: Nondisplaced subcapital fracture of the right femoral neck. .  There is no evidence of  dislocation. . The  joint spaces appear well maintained. The soft tissues are unremarkable. Nondisplaced subcapital fracture of the right femoral neck. DISCHARGE INSTRUCTIONS     Discharge Medications:        Medication List      START taking these medications    enoxaparin 30 MG/0.3ML injection  Commonly known as: LOVENOX  Inject 0.3 mLs into the skin daily     polyethylene glycol 17 g packet  Commonly known as: GLYCOLAX  Take 17 g by mouth daily for 7 days        CONTINUE taking these medications    ALPRAZolam 1 MG tablet  Commonly known as: XANAX  TAKE 1 TABLET BY MOUTH TWICE DAILY AS NEEDED     ibuprofen 800 MG tablet  Commonly known as: ADVIL;MOTRIN  TK 1 T PO TID PRN        STOP taking these medications    cyclobenzaprine 10 MG tablet  Commonly known as: FLEXERIL     meclizine 12.5 MG tablet  Commonly known as: ANTIVERT     ondansetron 4 MG tablet  Commonly known as: ZOFRAN     SUMAtriptan 100 MG tablet  Commonly known as: IMITREX        ASK your doctor about these medications    oxyCODONE 5 MG immediate release tablet  Commonly known as: ROXICODONE  Take 1 tablet by mouth every 8 hours as needed for Pain for up to 3 days. Ask about: Should I take this medication? * PHENobarbital 32.4 MG tablet  Commonly known as: LUMINAL  Ask about: Which instructions should I use? * PHENobarbital 64.8 MG tablet  Commonly known as: LUMINAL  Take 1 tablet by mouth 2 times daily for 7 days. Ask about: Which instructions should I use? * This list has 2 medication(s) that are the same as other medications prescribed for you. Read the directions carefully, and ask your doctor or other care provider to review them with you.                Where to Get Your Medications      You can get these medications from any pharmacy    Bring a paper prescription for each of these medications  oxyCODONE 5 MG immediate release tablet  PHENobarbital 64.8 MG tablet     Information about where to get these medications is not yet available    Ask your nurse or doctor about these medications  enoxaparin 30 MG/0.3ML injection  polyethylene glycol 17 g packet       Diet: No diet orders on file diet as tolerated  Activity: As instructed WEIGHT BEARING STATUS: Weight bearing as tolerated  Wound Care: Daily and as needed. DISPOSITION: Skilled Facility    Follow-up:  Emerson Gowers, MD  [address]  [place] [facility] [address] , APRN - CNP  [facility] [address]  [address] [facility]  [phone]    Schedule an appointment as soon as possible for a visit on 3/4/2022  8:20 am Post hospital follow up with Orthopedics , For wound re-check. Call to confirm appointment.         SIGNED:  Lelon Apley, APRN - CNP   2/21/2022, 2:51 PM  Time Spent for discharge: 35 minutes

## 2022-02-22 ENCOUNTER — HOSPITAL ENCOUNTER (OUTPATIENT)
Age: 57
Setting detail: SPECIMEN
Discharge: HOME | End: 2022-02-22

## 2022-02-22 LAB
ANION GAP SERPL CALCULATED.3IONS-SCNC: 14 MMOL/L (ref 9–17)
BUN BLDV-MCNC: 7 MG/DL (ref 6–20)
CALCIUM SERPL-MCNC: 8.8 MG/DL (ref 8.6–10.4)
CHLORIDE BLD-SCNC: 103 MMOL/L (ref 98–107)
CO2: 24 MMOL/L (ref 20–31)
CREAT SERPL-MCNC: 0.34 MG/DL (ref 0.5–0.9)
GFR AFRICAN AMERICAN: >60 ML/MIN
GFR NON-AFRICAN AMERICAN: >60 ML/MIN
GFR SERPL CREATININE-BSD FRML MDRD: ABNORMAL ML/MIN/{1.73_M2}
GLUCOSE BLD-MCNC: 112 MG/DL (ref 70–99)
HCT VFR BLD CALC: 29.9 % (ref 36.3–47.1)
HEMOGLOBIN: 9.8 G/DL (ref 11.9–15.1)
MCH RBC QN AUTO: 29.8 PG (ref 25.2–33.5)
MCHC RBC AUTO-ENTMCNC: 32.8 G/DL (ref 28.4–34.8)
MCV RBC AUTO: 90.9 FL (ref 82.6–102.9)
NRBC AUTOMATED: 0 PER 100 WBC
PDW BLD-RTO: 13.8 % (ref 11.8–14.4)
PLATELET # BLD: 313 K/UL (ref 138–453)
PMV BLD AUTO: 9.4 FL (ref 8.1–13.5)
POTASSIUM SERPL-SCNC: 4.2 MMOL/L (ref 3.7–5.3)
RBC # BLD: 3.29 M/UL (ref 3.95–5.11)
SODIUM BLD-SCNC: 141 MMOL/L (ref 135–144)
WBC # BLD: 5 K/UL (ref 3.5–11.3)

## 2022-02-22 PROCEDURE — 80048 BASIC METABOLIC PNL TOTAL CA: CPT

## 2022-02-22 PROCEDURE — 36415 COLL VENOUS BLD VENIPUNCTURE: CPT

## 2022-02-22 PROCEDURE — P9603 ONE-WAY ALLOW PRORATED MILES: HCPCS

## 2022-02-22 PROCEDURE — 85027 COMPLETE CBC AUTOMATED: CPT

## 2022-03-01 ENCOUNTER — TELEPHONE (OUTPATIENT)
Dept: PRIMARY CARE CLINIC | Age: 57
End: 2022-03-01

## 2022-03-01 NOTE — TELEPHONE ENCOUNTER
Jose Jacques from [place]. BP was 132/96 today. Pt not having any symptoms.  Thelma Kin  [phone] [facility] # [phone]

## 2022-03-04 ENCOUNTER — OFFICE VISIT (OUTPATIENT)
Dept: ORTHOPEDIC SURGERY | Age: 57
End: 2022-03-04

## 2022-03-04 DIAGNOSIS — S72.001A CLOSED FRACTURE OF NECK OF RIGHT FEMUR, INITIAL ENCOUNTER (HCC): Primary | ICD-10-CM

## 2022-03-04 PROCEDURE — 99024 POSTOP FOLLOW-UP VISIT: CPT | Performed by: NURSE PRACTITIONER

## 2022-03-04 RX ORDER — METHOCARBAMOL 750 MG/1
750 TABLET, FILM COATED ORAL 3 TIMES DAILY
Qty: 30 TABLET | Refills: 0 | Status: SHIPPED | OUTPATIENT
Start: 2022-03-04 | End: 2022-03-14

## 2022-03-04 RX ORDER — ACETAMINOPHEN 500 MG
500 TABLET ORAL 4 TIMES DAILY PRN
Qty: 60 TABLET | Refills: 0 | Status: SHIPPED | OUTPATIENT
Start: 2022-03-04 | End: 2022-09-01

## 2022-03-04 RX ORDER — OXYCODONE HYDROCHLORIDE 5 MG/1
5 TABLET ORAL EVERY 8 HOURS PRN
Qty: 21 TABLET | Refills: 0 | Status: SHIPPED | OUTPATIENT
Start: 2022-03-04 | End: 2022-03-11

## 2022-03-04 NOTE — PROGRESS NOTES
[facility]  [address] [facility]  Dept Phone: [phone]  Dept Fax: [phone]      Orthopaedic Trauma Clinic Follow Up      Subjective:   Date of Surgery: 2/14/2022    Bernarda Crabtree is a 61y.o. year old female who presents to the clinic today for routine follow up 2.5 weeks status post total hip arthroplasty for a right femoral neck fracture. Patient states she has been mostly ambulating without the walker but uses it when she is outside of the home for long distances. Denies any new injuries or falls. States she is having her rotator cuff repaired soon so she does not want to use up all of her outpatient therapy visits on her hip because she is saving them for the shoulder. She endorses pain if she does too much throughout the day and states she often pushes herself further than she knows she should and ends up sore the next day. She is supposed to start home physical therapy today. She feels she is doing well in regards to mobility and function but is lacking strength and feels home therapy may be a waste of time. 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 States she has access to a therapy room at work with exercise equipment including a stationary bike that she intends to use to improve her strength. Review of Systems  Gen: no fever, chills, malaise  CV: no chest pain or palpitations  Resp: no cough or shortness of breath  GI: no nausea, vomiting, diarrhea, or constipation  Neuro: no seizures, vertigo, or headache  Msk: right hip pain   10 remaining systems reviewed and negative    Objective : There were no vitals filed for this visit. There is no height or weight on file to calculate BMI. General: No acute distress, resting comfortably in the clinic  Neuro: alert. oriented  Eyes: Extra-ocular muscles intact  Pulm: Respirations unlabored and regular.   Skin: warm, well perfused  Psych:   Patient has good fund of knowledge and displays understanding of exam, diagnosis, and plan. RLE:  Skin intact, incision healing without evidence of dehiscence, drainage or erythema. Swelling noted to right hip/thigh. Mild TTP over incision and gluteal region. Compartments soft. 2+ DP pulse. TA/EHL/FHL/GS motor intact. Deep and Superficial Peroneal/Saphenous/Sural SILT. Radiology:  No radiographs obtained today. Assessment:   61y.o. year old female with a right femoral neck fracture s/p DENIZ; DOS: 2/14/2022  Plan:   - Overall, patient is doing very well in regards to function and pain. Discussed finding that \"happy-medium\" with pushing herself in regards to strength and endurance. - Patient very adamant about going back to work with administration duties, doing only seated work for the next 4 weeks. Note provided with these restrictions. - Encouraged patient to discuss specific goals with therapy such as strength/endurance and goals for returning to work full time without restrictions. - F/u in 4 weeks with x-rays and repeat exam. At that time, we will discuss returning to work without restrictions. Follow up:Return in about 4 weeks (around 4/1/2022) for x-rays. Orders Placed This Encounter   Medications    oxyCODONE (ROXICODONE) 5 MG immediate release tablet     Sig: Take 1 tablet by mouth every 8 hours as needed for Pain (use as a last resort when all other prescribed medications have failed to adequately control pain) for up to 7 days.      Dispense:  21 tablet     Refill:  0     Reduce doses taken as pain becomes manageable    methocarbamol (ROBAXIN-750) 750 MG tablet     Sig: Take 1 tablet by mouth 3 times daily for 10 days     Dispense:  30 tablet     Refill:  0    acetaminophen (TYLENOL) 500 MG tablet     Sig: Take 1 tablet by mouth 4 times daily as needed for Pain     Dispense:  60 tablet     Refill:  0          No orders of the defined types were placed in this encounter. Electronically signed by ZUHAIR Villaseñor CNP on 3/4/2022 at 10:16 AM    This note is created with the assistance of a speech recognition program.  While intending to generate a document that actually reflects the content of the visit, the document can still have some errors including those of syntax and sound a like substitutions which may escape proof reading.   In such instances, actual meaning can be extrapolated by contextual diversion

## 2022-03-04 NOTE — LETTER
[facility]  [address] [facility]  Phone: [phone]  Fax: [phone]    ZUHAIR Garcia CNP        March 4, 2022     Patient: Larisa Jenkins   YOB: 1965   Date of Visit: 3/4/2022       To Whom it May Concern:    Scott Tolliver was seen in my clinic on 3/4/2022. She may return to work on 3/9/2022 with seated work only until 4/20/2022. She will be re-evaluated in our clinic prior to removing these restrictions. If you have any questions or concerns, please don't hesitate to call.     Sincerely,         ZUHAIR Garcia CNP

## 2022-03-07 ENCOUNTER — TELEPHONE (OUTPATIENT)
Dept: ORTHOPEDIC SURGERY | Age: 57
End: 2022-03-07

## 2022-03-07 NOTE — TELEPHONE ENCOUNTER
Patient was seen on Friday, 3-4-22 and was given a RTW note for Wednesday, 3-9-22. Over the weekend the patient re-injured her hip and is having a hard time getting around, and she doesn't feel that she will be able to return to work on 3-9-22. Patient would like the note for her return to work date changed to 3-14-22. Patient states the note should have the same wording as the first, just with the change of date to return to work. Patient can be reached at [phone], and she can come pick the note up.

## 2022-03-08 ENCOUNTER — OFFICE VISIT (OUTPATIENT)
Dept: ORTHOPEDIC SURGERY | Age: 57
End: 2022-03-08

## 2022-03-08 DIAGNOSIS — S72.001A CLOSED FRACTURE OF NECK OF RIGHT FEMUR, INITIAL ENCOUNTER (HCC): Primary | ICD-10-CM

## 2022-03-08 DIAGNOSIS — S72.001D CLOSED FRACTURE OF NECK OF RIGHT FEMUR WITH ROUTINE HEALING, SUBSEQUENT ENCOUNTER: Primary | ICD-10-CM

## 2022-03-08 PROCEDURE — 99024 POSTOP FOLLOW-UP VISIT: CPT | Performed by: ORTHOPAEDIC SURGERY

## 2022-03-08 RX ORDER — METHYLPREDNISOLONE 4 MG/1
TABLET ORAL
Qty: 1 KIT | Refills: 0 | Status: SHIPPED | OUTPATIENT
Start: 2022-03-08 | End: 2022-03-14

## 2022-03-08 NOTE — PROGRESS NOTES
knowledge and displays understanging of exam, diagnosis, and plan. RLE: Skin intact, incision healing well without any evidence of dehiscence, drainage or erythema. Tender to touch to the right hip. 2+ DP pulses. TA/EHL/FHL/GS motor intact. Deep and superficial peroneal/saphenous/sural intact    Radiology:  History:   AP and crosstable lateral of right hip    Findings: 2 views of the right hip in a skeletally mature patient showing total hip arthroplasty. There is no evidence of fracture or malalignment identified. Soft tissue changes consistent with recent surgery. Impression: Expected postoperative state status post right hip arthroplasty. Assessment:   61y.o. year old female with a right femoral neck fracture status post DENIZ; DOS: 2/14/2022  Plan:      Patient states that she is in pain and has trouble ambulating. Discussed with patient that at this time she should pause her home physical therapy and continue resting as that will help with the exaggerated pain. Patient also educated on low concern for osseous abnormalities based on x-rays and physical exam.  Patient given a prescription of Medrol Dosepak and educated on the risks and benefits. Patient verbalizes understanding. Patient also educated on using ice pack for her right hip and right lower back and to transition to heat pack in a couple days. She also states that she has to go to work but given the recent injury it is advisable for the patient to rest for 1 week. Work release note given to patient. Patient to follow-up in 3 weeks with x-rays and repeat exam.    Follow up:No follow-ups on file. Orders Placed This Encounter   Medications    methylPREDNISolone (MEDROL DOSEPACK) 4 MG tablet     Sig: Take by mouth. Dispense:  1 kit     Refill:  0        No orders of the defined types were placed in this encounter.     Ana aLura Gillis DPM   Orthopedic Service  3/8/2022 at 2:30 PM    Electronically signed by Ana Laura Gillis DPM on 3/8/2022 at 2:17 PM

## 2022-03-08 NOTE — LETTER
[facility]  [address] [facility]  Phone: [phone]  Fax: [phone]    Funmi Rodriguez DO        March 8, 2022     Patient: Karen Lundberg   YOB: 1965   Date of Visit: 3/8/2022       To Whom it May Concern:    Lissa Gilbert was seen in my clinic on 3/8/2022. She may return to work on 3/17/2022 with seated work only. She will be re-evaluated in 3 weeks from today's appointment to re-assess her work restrictions. If you have any questions or concerns, please don't hesitate to call.     Sincerely,         Funmi Rodriguez DO

## 2022-03-08 NOTE — Clinical Note
[facility]  [address] [facility]  Phone: [phone]  Fax: [phone]    Cande South DO        March 8, 2022     Patient: Balbina Toro   YOB: 1965   Date of Visit: 3/8/2022       To Whom It May Concern: It is my medical opinion that Roger Ramirez {Work release (duty restriction):61930}. If you have any questions or concerns, please don't hesitate to call.     Sincerely,        Cande South DO

## 2022-03-14 DIAGNOSIS — S72.001A CLOSED FRACTURE OF NECK OF RIGHT FEMUR, INITIAL ENCOUNTER (HCC): ICD-10-CM

## 2022-03-14 RX ORDER — METHOCARBAMOL 750 MG/1
TABLET, FILM COATED ORAL
Qty: 30 TABLET | Refills: 0 | Status: SHIPPED | OUTPATIENT
Start: 2022-03-14 | End: 2022-03-28

## 2022-03-27 DIAGNOSIS — S72.001A CLOSED FRACTURE OF NECK OF RIGHT FEMUR, INITIAL ENCOUNTER (HCC): ICD-10-CM

## 2022-03-28 RX ORDER — METHOCARBAMOL 750 MG/1
TABLET, FILM COATED ORAL
Qty: 30 TABLET | Refills: 0 | Status: SHIPPED | OUTPATIENT
Start: 2022-03-28 | End: 2022-04-22

## 2022-04-05 ENCOUNTER — OFFICE VISIT (OUTPATIENT)
Dept: ORTHOPEDIC SURGERY | Age: 57
End: 2022-04-05

## 2022-04-05 VITALS — HEIGHT: 62 IN | WEIGHT: 98 LBS | BODY MASS INDEX: 18.03 KG/M2

## 2022-04-05 DIAGNOSIS — S72.001A CLOSED FRACTURE OF NECK OF RIGHT FEMUR, INITIAL ENCOUNTER (HCC): Primary | ICD-10-CM

## 2022-04-05 DIAGNOSIS — S72.001D CLOSED FRACTURE OF NECK OF RIGHT FEMUR WITH ROUTINE HEALING, SUBSEQUENT ENCOUNTER: Primary | ICD-10-CM

## 2022-04-05 PROCEDURE — 99024 POSTOP FOLLOW-UP VISIT: CPT | Performed by: ORTHOPAEDIC SURGERY

## 2022-04-05 RX ORDER — SUMATRIPTAN 50 MG/1
TABLET, FILM COATED ORAL
COMMUNITY
Start: 2022-02-24 | End: 2022-05-23 | Stop reason: SDUPTHER

## 2022-04-05 RX ORDER — GABAPENTIN 300 MG/1
CAPSULE ORAL
COMMUNITY
Start: 2022-02-24 | End: 2022-04-05 | Stop reason: SDUPTHER

## 2022-04-05 RX ORDER — GABAPENTIN 300 MG/1
CAPSULE ORAL
Qty: 90 CAPSULE | Refills: 0 | Status: SHIPPED | OUTPATIENT
Start: 2022-04-05 | End: 2022-09-01

## 2022-04-05 RX ORDER — METHYLPREDNISOLONE 4 MG/1
TABLET ORAL
Qty: 1 KIT | Refills: 0 | Status: SHIPPED | OUTPATIENT
Start: 2022-04-05 | End: 2022-04-11

## 2022-04-05 RX ORDER — ALPRAZOLAM 1 MG/1
1 TABLET ORAL NIGHTLY PRN
COMMUNITY
End: 2022-08-09 | Stop reason: SDUPTHER

## 2022-04-05 NOTE — PROGRESS NOTES
[address] [facility]  [facility] [address]  Dept: [phone]  Dept Fax: [phone]        Orthopaedic Trauma Clinic Follow Up      Subjective:   Date of Surgery: 2/14/2022    Shaheen Kent is a 61y.o. year old female who presents to the clinic today for routine followup regarding her right hip. Patient is status post 7 weeks from a right total hip arthroplasty for a right femoral neck fracture. Mechanism of injury was a fall from standing height. 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 She states that she needs to climbing ladders, transport patients, and needs to be able to be physically active for her job. She states continue weakness and pain in the right hip, back, and knee. She has a history of total knee replacement 2 years ago. She has chronic back pain from roughly 9 years ago. She feels like she has reaggravated her back pain. She complains of right buttock pain that radiates down the back of the right leg. She has some right lateral thigh numbness that is her baseline but denies any numbness or tingling to the foot. Review of Systems  Gen: no fever, chills, malaise  CV: no chest pain or palpitations  Resp: no cough or shortness of breath  GI: no nausea, vomiting, diarrhea, or constipation  Neuro: no numbness, tingling, or weakness  Msk: Right hip myalgias, arthralgias  10 remaining systems reviewed and negative    Objective : There were no vitals filed for this visit. Body mass index is 17.92 kg/m². General: No acute distress, resting comfortably in the clinic  Neuro: alert. oriented  Eyes: Extra-ocular muscles intact  Pulm: Respirations unlabored and regular. Skin: warm, well perfused  Psych:   Patient has good fund of knowledge and displays understanging of exam, diagnosis, and plan.   MSK: Right hip: Well healed incision to anterior hip without erythema, drainage, or sign of infection. Mild pain to palpation over the lateral hip and posteriorly through the buttock along the sciatic nerve. Nonpainful to passive range of motion of the hip. Mildly painful to active range of motion about the hip. Negative straight leg raise and contralateral straight leg raise. 4-5 muscle strength to the right lower extremity major muscle groups due to overall deconditioning. Dysesthesias to the lateral thigh, otherwise, sensation intact L3-S1. DP 2+. Radiology:  History: Right total hip artrhoplasty    Comparison: Films from 3/8/22    Findings: Views of the right hip including AP and lateral demonstrating stable total hip arthroplasty without evidence of loosening or failure of hardware. There is no periprosthetic fracturing or dislocation. No lytic or blastic lesions noted. Impression: Stable right total hip arthroplasty     Assessment:   61y.o. year old female s/p 7 weeks from right total hip arthroplasty for a right femoral neck fracture. Plan:    -Patient is doing relatively well given her postoperative setback. She had started physical therapy but was only able to attended 1 visit before her fall. She does continue to have weakness and would benefit from additional formal physical therapy.  -Prescription for formal physical therapy was written in office today. We will have them work on her lower back as well. -We will prescribe another Medrol Dosepak to see if this alleviates any of her lower back symptoms.  -Prescription for gabapentin was refilled today  -She will continue work restrictions of pushing, lifting, and pulling less than 10 pounds.  -She does have diagnosed osteoporosis on DEXA scan as well as her hip fracture. We will refer her to Dr. Inna Rubin for evaluation and treatment of her osteoporosis.   -If she has continued back pain despite physical therapy we will refer her to Dr. Con William for evaluation.  -Follow-up in 6 weeks with repeat x-rays of the right hip.       Electronically signed by Charito Childers DO on 4/5/2022 at 2:24 PM

## 2022-04-05 NOTE — LETTER
[facility]  [address] [facility] [address] [facility]  Phone: [phone]  Fax: [phone]    Rosalva Howell DO        April 5, 2022     Patient: Brittany Jacques   YOB: 1965   Date of Visit: 4/5/2022       To Whom It May Concern: It is my medical opinion that Brielle Fritz may return to light duty immediately with the following restrictions: pushing/pulling not to exceed 10 lbs. If you have any questions or concerns, please don't hesitate to call.     Sincerely,        Rosalva Howell DO

## 2022-04-22 DIAGNOSIS — S72.001A CLOSED FRACTURE OF NECK OF RIGHT FEMUR, INITIAL ENCOUNTER (HCC): ICD-10-CM

## 2022-04-22 RX ORDER — METHOCARBAMOL 750 MG/1
TABLET, FILM COATED ORAL
Qty: 30 TABLET | Refills: 0 | Status: SHIPPED | OUTPATIENT
Start: 2022-04-22 | End: 2022-09-01

## 2022-05-17 ENCOUNTER — OFFICE VISIT (OUTPATIENT)
Dept: ORTHOPEDIC SURGERY | Age: 57
End: 2022-05-17
Payer: COMMERCIAL

## 2022-05-17 VITALS — BODY MASS INDEX: 18.03 KG/M2 | HEIGHT: 62 IN | WEIGHT: 98 LBS

## 2022-05-17 DIAGNOSIS — G89.29 CHRONIC LOW BACK PAIN WITH BILATERAL SCIATICA, UNSPECIFIED BACK PAIN LATERALITY: ICD-10-CM

## 2022-05-17 DIAGNOSIS — S72.001D CLOSED FRACTURE OF NECK OF RIGHT FEMUR WITH ROUTINE HEALING, SUBSEQUENT ENCOUNTER: Primary | ICD-10-CM

## 2022-05-17 DIAGNOSIS — M54.42 CHRONIC LOW BACK PAIN WITH BILATERAL SCIATICA, UNSPECIFIED BACK PAIN LATERALITY: ICD-10-CM

## 2022-05-17 DIAGNOSIS — G40.909 SEIZURE DISORDER (HCC): ICD-10-CM

## 2022-05-17 DIAGNOSIS — R42 CHRONIC VERTIGO: ICD-10-CM

## 2022-05-17 DIAGNOSIS — M54.41 CHRONIC LOW BACK PAIN WITH BILATERAL SCIATICA, UNSPECIFIED BACK PAIN LATERALITY: ICD-10-CM

## 2022-05-17 DIAGNOSIS — S72.001A CLOSED FRACTURE OF NECK OF RIGHT FEMUR, INITIAL ENCOUNTER (HCC): Primary | ICD-10-CM

## 2022-05-17 PROCEDURE — 99214 OFFICE O/P EST MOD 30 MIN: CPT | Performed by: NURSE PRACTITIONER

## 2022-05-17 PROCEDURE — 3017F COLORECTAL CA SCREEN DOC REV: CPT | Performed by: ORTHOPAEDIC SURGERY

## 2022-05-17 PROCEDURE — 1036F TOBACCO NON-USER: CPT | Performed by: ORTHOPAEDIC SURGERY

## 2022-05-17 PROCEDURE — G8419 CALC BMI OUT NRM PARAM NOF/U: HCPCS | Performed by: NURSE PRACTITIONER

## 2022-05-17 PROCEDURE — G8427 DOCREV CUR MEDS BY ELIG CLIN: HCPCS | Performed by: NURSE PRACTITIONER

## 2022-05-17 NOTE — LETTER
[facility]  [facility] [facility] [address] [facility]  Phone: [phone]  Fax: [phone]    Ute Gaming DO        May 17, 2022     Patient: Karlos Salvador   YOB: 1965   Date of Visit: 5/17/2022       To Whom it May Concern:    Cheyenne Jay was seen in my clinic on 5/17/2022. She may return to work on 5/18/2022 with no restrictions regarding activity with the right lower extremity. .    If you have any questions or concerns, please don't hesitate to call.     Sincerely,         Ute Gaming DO

## 2022-05-17 NOTE — PROGRESS NOTES
[facility]  [address] [id number] [facility]  Dept Phone: [phone]  Dept Fax: [phone]      Orthopaedic Trauma Clinic Follow Up      Subjective:   Date of Surgery: 2/14/2022    Su Sebastian is a 61y.o. year old female who presents to the clinic today for routine follow up 13 weeks status post a right total hip arthroplasty. 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 States she has assistants who can help her out at work if there are days when she is feeling sore. States overall, she has daily aches in the lateral hip and groin but the pain does not limit her daily activities. States she has no limiting activities and no specific movement that aggravates the hip but notes she still has weakness in the right hip that continues to improve. States she completed formal physical therapy last week and has a home exercise program to continue on her own. States she was gardening all day Sunday so she was sore yesterday and today but the soreness goes away after a few days. Denies any new injuries or falls. States she has numbness/tingling in bilateral lower extremities at baseline due to sciatica, not related to the right hip. Patient states she has noticed increased concerns with her vision and balance that makes her fearful for future falls, due to her PMH of seizures and vertigo, she thinks it is time to go see a new neurologist to be further evaluated and would like a referral.     Review of Systems  Gen: no fever, chills, malaise  CV: no chest pain or palpitations  Resp: no cough or shortness of breath  GI: no nausea, vomiting, diarrhea, or constipation  Neuro: no seizures, vertigo, or headache  Msk: right hip soreness   10 remaining systems reviewed and negative    Objective : There were no vitals filed for this visit. Body mass index is 17.92 kg/m².   General: No acute distress, resting comfortably in the clinic  Neuro: alert. oriented  Eyes: Extra-ocular muscles intact  Pulm: Respirations unlabored and regular. Skin: warm, well perfused  Psych:   Patient has good fund of knowledge and displays understanding of exam, diagnosis, and plan. RLE:  Skin intact, incisions well approximated with no evidence of dehiscence or infection. TTP bilateral greater trochanters. Hip internal rotation to approximately 30 degrees, External rotation to approximately 60 degrees. Negative log roll. Compartments soft. 2+ DP pulse. TA/EHL/FHL/GS motor intact. Deep and Superficial Peroneal/Saphenous/Sural SILT. Radiology:  History: Right total hip arthroplasty     Comparison: 4/5/2022    Findings: 2 views of the right hip (AP, lateral) in a skeletally mature patient re-demonstrating a total hip arthoplasty. No evidence of orthopedic hardware complications or loss of alignment. No periprosthetic fractures, other fractures or dislocations identified. Impression: Stable alignment of a right total hip arthroplasty without complications. Assessment:   61y.o. year old female with a right DENIZ; DOS: 2/14/2022. Plan:   - Overall, patient is doing very well and has returned to work full time and her daily activities/hobbies including gardening with minimal pain. - Encouraged to continue home exercise regimen to progress her strength. - Referral provided for Dr. Patricio Cuevas for chronic low back pain with sciatica and referral to Neurology to evaluate neurological disorders. - F/u as needed     Follow up:Return if symptoms worsen or fail to improve. No orders of the defined types were placed in this encounter.          Orders Placed This Encounter   Procedures   Ino Smiley MD, Orthopedic Surgery, [facility]     Referral Priority:   Routine     Referral Type:   Eval and Treat     Referral Reason:   Specialty Services Required     Referred to Provider:   Lou Garcia MD Requested Specialty:   Orthopedic Surgery     Number of Visits Requested:   Mari De León MD, Neuro-Endovascular Surgery, [address]     Referral Priority:   Routine     Referral Type:   Eval and Treat     Referral Reason:   Specialty Services Required     Requested Specialty:   General Surgery     Number of Visits Requested:   1       Electronically signed by ZUHAIR Ledesma CNP on 5/17/2022 at 4:34 PM    This note is created with the assistance of a speech recognition program.  While intending to generate a document that actually reflects the content of the visit, the document can still have some errors including those of syntax and sound a like substitutions which may escape proof reading.   In such instances, actual meaning can be extrapolated by contextual diversion

## 2022-05-23 ENCOUNTER — TELEPHONE (OUTPATIENT)
Dept: PRIMARY CARE CLINIC | Age: 57
End: 2022-05-23

## 2022-05-23 DIAGNOSIS — G40.909 SEIZURE DISORDER (HCC): ICD-10-CM

## 2022-05-23 RX ORDER — ALPRAZOLAM 1 MG/1
TABLET ORAL
Qty: 60 TABLET | Refills: 1 | Status: SHIPPED | OUTPATIENT
Start: 2022-05-23 | End: 2022-06-23

## 2022-05-23 RX ORDER — SUMATRIPTAN 100 MG/1
100 TABLET, FILM COATED ORAL DAILY PRN
Qty: 9 TABLET | Refills: 5 | Status: SHIPPED | OUTPATIENT
Start: 2022-05-23 | End: 2022-09-01 | Stop reason: SDUPTHER

## 2022-05-23 RX ORDER — SUMATRIPTAN 50 MG/1
TABLET, FILM COATED ORAL
Qty: 9 TABLET | Refills: 2 | Status: SHIPPED | OUTPATIENT
Start: 2022-05-23 | End: 2022-05-23

## 2022-05-23 RX ORDER — ONDANSETRON 4 MG/1
4 TABLET, ORALLY DISINTEGRATING ORAL 3 TIMES DAILY PRN
Qty: 21 TABLET | Refills: 0 | Status: SHIPPED | OUTPATIENT
Start: 2022-05-23 | End: 2022-09-01 | Stop reason: SDUPTHER

## 2022-05-23 NOTE — TELEPHONE ENCOUNTER
Patient states wrong rx was sent to pharmacy.      Patient states she takes Imitrex 100 mg     Please advise

## 2022-08-01 DIAGNOSIS — G40.909 SEIZURE DISORDER (HCC): ICD-10-CM

## 2022-08-01 RX ORDER — GABAPENTIN 300 MG/1
CAPSULE ORAL
Qty: 90 CAPSULE | Refills: 0 | OUTPATIENT
Start: 2022-08-01

## 2022-08-01 RX ORDER — ALPRAZOLAM 1 MG/1
TABLET ORAL
Qty: 60 TABLET | Refills: 1 | OUTPATIENT
Start: 2022-08-01 | End: 2022-09-01

## 2022-08-01 NOTE — TELEPHONE ENCOUNTER
Refill not appropriate, we are no longer following the patient and this medication has not been prescribed in over 3 months. Please refer to PCP or neurology for medication refill.

## 2022-08-03 RX ORDER — ALPRAZOLAM 1 MG/1
1 TABLET ORAL NIGHTLY PRN
Qty: 60 TABLET | Refills: 0 | OUTPATIENT
Start: 2022-08-03 | End: 2022-09-02

## 2022-08-04 NOTE — TELEPHONE ENCOUNTER
See other encounter. On 8/2/22 - 8/4/22 phone calls were made to make appt. Letter sent to schedule.

## 2022-08-09 DIAGNOSIS — G47.00 INSOMNIA, UNSPECIFIED TYPE: ICD-10-CM

## 2022-08-09 DIAGNOSIS — G40.909 SEIZURE DISORDER (HCC): ICD-10-CM

## 2022-08-09 DIAGNOSIS — G40.909 SEIZURE DISORDER (HCC): Primary | ICD-10-CM

## 2022-08-09 RX ORDER — PHENOBARBITAL 32.4 MG/1
64.8 TABLET ORAL 2 TIMES DAILY
Qty: 84 TABLET | Refills: 0 | Status: SHIPPED | OUTPATIENT
Start: 2022-08-09 | End: 2022-08-30

## 2022-08-09 RX ORDER — ALPRAZOLAM 1 MG/1
TABLET ORAL
Qty: 60 TABLET | Refills: 1 | OUTPATIENT
Start: 2022-08-09 | End: 2022-09-09

## 2022-08-09 RX ORDER — ALPRAZOLAM 1 MG/1
1 TABLET ORAL NIGHTLY PRN
Qty: 60 TABLET | Refills: 0 | Status: SHIPPED | OUTPATIENT
Start: 2022-08-09 | End: 2022-09-01 | Stop reason: SDUPTHER

## 2022-08-23 ENCOUNTER — OFFICE VISIT (OUTPATIENT)
Dept: NEUROLOGY | Age: 57
End: 2022-08-23
Payer: COMMERCIAL

## 2022-08-23 VITALS
RESPIRATION RATE: 18 BRPM | DIASTOLIC BLOOD PRESSURE: 89 MMHG | BODY MASS INDEX: 19.69 KG/M2 | HEART RATE: 67 BPM | WEIGHT: 107 LBS | TEMPERATURE: 97.3 F | SYSTOLIC BLOOD PRESSURE: 139 MMHG | HEIGHT: 62 IN | OXYGEN SATURATION: 99 %

## 2022-08-23 DIAGNOSIS — G40.909 NONINTRACTABLE EPILEPSY WITHOUT STATUS EPILEPTICUS, UNSPECIFIED EPILEPSY TYPE (HCC): Primary | ICD-10-CM

## 2022-08-23 DIAGNOSIS — R42 VERTIGO: ICD-10-CM

## 2022-08-23 PROCEDURE — 3017F COLORECTAL CA SCREEN DOC REV: CPT | Performed by: STUDENT IN AN ORGANIZED HEALTH CARE EDUCATION/TRAINING PROGRAM

## 2022-08-23 PROCEDURE — 99213 OFFICE O/P EST LOW 20 MIN: CPT | Performed by: STUDENT IN AN ORGANIZED HEALTH CARE EDUCATION/TRAINING PROGRAM

## 2022-08-23 PROCEDURE — G8420 CALC BMI NORM PARAMETERS: HCPCS | Performed by: STUDENT IN AN ORGANIZED HEALTH CARE EDUCATION/TRAINING PROGRAM

## 2022-08-23 PROCEDURE — 1036F TOBACCO NON-USER: CPT | Performed by: STUDENT IN AN ORGANIZED HEALTH CARE EDUCATION/TRAINING PROGRAM

## 2022-08-23 PROCEDURE — G8427 DOCREV CUR MEDS BY ELIG CLIN: HCPCS | Performed by: STUDENT IN AN ORGANIZED HEALTH CARE EDUCATION/TRAINING PROGRAM

## 2022-08-23 RX ORDER — MECLIZINE HCL 12.5 MG/1
25 TABLET ORAL 3 TIMES DAILY PRN
Qty: 30 TABLET | Refills: 3 | Status: SHIPPED | OUTPATIENT
Start: 2022-08-23 | End: 2022-08-26

## 2022-08-23 ASSESSMENT — ENCOUNTER SYMPTOMS
BACK PAIN: 0
ABDOMINAL DISTENTION: 0
DIARRHEA: 0
CHEST TIGHTNESS: 0
SHORTNESS OF BREATH: 0
WHEEZING: 0
STRIDOR: 0
TROUBLE SWALLOWING: 0
CHOKING: 0
APNEA: 0
SORE THROAT: 0
CONSTIPATION: 0
COLOR CHANGE: 0
NAUSEA: 0

## 2022-08-23 ASSESSMENT — VISUAL ACUITY: VA_NORMAL: 1

## 2022-08-23 NOTE — PROGRESS NOTES
[address] [place] [address],  [address], [facility], [address] [facility], [address] [place] [address]  P: [phone]  F: [address] NOTE     PATIENT NAME: Jaleesa Antonio  PATIENT MRN: [id number]  PRIMARY CARE PHYSICIAN: René Dover MD    HPI:      Jaleesa Antonio is a 61 y.o. female who presents to clinic today for an evaluation. She has a PMH of seizures, anxiety, chronic vertigo, migraines and a recent hip fracture. Patient has not seen a neurologist for several years but previously used to follow-up with Dr. Kieran Cano. Seizures: She states that she was diagnosed with seizure activity in [facility] in 18. She has been on a multitude of medications including #1 Dilantin then Depakote and Topamax. There is an associated family history of epilepsy. She is currently on phenobarbital.  Her last seizure was 2 weeks ago with generalized tonic-clonic activity and LOC. Her previous seizure before that was also 2 weeks ago and then approximately 1 year for then. She was recently diagnosed and treated for UTI after having a breakthrough seizure. She also has had multiple stressors, lack of sleep and was working 2 jobs which exacerbated her seizure activity. She was also taking the incorrect dose of phenobarbital, apparently only taking it at night and now taking it twice a day. Her seizures are usually associated with loss of consciousness and generalized tonic-clonic activity but can also display of partial seizures with staring spells. Has not had an EEG for over 7 years. Vertigo: Patient states that she has a history of chronic vertigo. She displays it as the room is spinning. She is also seen ENT in the past and vestibular rehab. She states that these episodes are occurring daily and usually worsen with change in position. She was previously told that it is not an inner ear problem by ENT. She occasionally takes meclizine with improvement episodes.     Gait unsteadiness: Episode started in 2021 where she states that she is unable to walk in a straight line. These episodes occur intermittently and she has recently had a fall that she states is secondary to clumsiness as her foot got stuck but also because her gait unsteadiness. She always states that she veers towards the right side. Migraines: Patient states that she has been having migraines since 4years of age. This is associated with photophobia phonophobia and no aura. She also has nausea and vomiting and usually located bifrontally. She only takes Imitrex as an abortive and no prophylactic medication. Each episode lasts 1 to 2 days and these usually occur 8 times a month. These have substantially improved since her previous presentation. PREVIOUS WORKUP:     Lab Results   Component Value Date    WBC 5.0 2022    HGB 9.8 (L) 2022    HCT 29.9 (L) 2022    MCV 90.9 2022     2022       Past Medical History:   Diagnosis Date    Migraines     Seizures ([address]         Past Surgical History:   Procedure Laterality Date     SECTION      CHOLECYSTECTOMY, LAPAROSCOPIC  2005    HIP ARTHROPLASTY Right 2022    TOTAL HIP ARTHROPLASTY  BEACH & NEPHEW (Right )    HIP SURGERY Right 2022    TOTAL HIP ARTHROPLASTY  BEACH & NEPHEW performed by Pavel Curry DO at [address] [place] [address], TOTAL ABDOMINAL (CERVIX REMOVED)      MANDIBLE FRACTURE SURGERY      SHOULDER SURGERY Left     TOTAL KNEE ARTHROPLASTY Right 2019    TOTAL KNEE ARTHROPLASTY Left 2017        Social History     Socioeconomic History    Marital status:       Spouse name: Not on file    Number of children: Not on file    Years of education: Not on file    Highest education level: Not on file   Occupational History    Not on file   Tobacco Use    Smoking status: Never    Smokeless tobacco: Never   Substance and Sexual Activity    Alcohol use: Yes     Comment: Socially Drug use: Never    Sexual activity: Not on file   Other Topics Concern    Not on file   Social History Narrative    Not on file     Social Determinants of Health     Financial Resource Strain: Not on file   Food Insecurity: Not on file   Transportation Needs: Not on file   Physical Activity: Not on file   Stress: Not on file   Social Connections: Not on file   Intimate Partner Violence: Not on file   Housing Stability: Not on file        Current Outpatient Medications   Medication Sig Dispense Refill    meclizine (ANTIVERT) 12.5 MG tablet Take 2 tablets by mouth 3 times daily as needed for Dizziness 30 tablet 3    PHENobarbital (LUMINAL) 32.4 MG tablet Take 2 tablets by mouth in the morning and 2 tablets before bedtime. Do all this for 21 days. 84 tablet 0    ALPRAZolam (XANAX) 1 MG tablet Take 1 tablet by mouth nightly as needed for Sleep for up to 21 days. 60 tablet 0    ondansetron (ZOFRAN-ODT) 4 MG disintegrating tablet Take 1 tablet by mouth 3 times daily as needed for Nausea or Vomiting 21 tablet 0    SUMAtriptan (IMITREX) 100 MG tablet Take 1 tablet by mouth daily as needed for Migraine TAKE 2 TABLETS BY MOUTH DAILY AS NEEDED FOR HEADACHE 9 tablet 5    ibuprofen (ADVIL;MOTRIN) 800 MG tablet TK 1 T PO TID  tablet 2    methocarbamol (ROBAXIN) 750 MG tablet TAKE 1 TABLET BY MOUTH THREE TIMES DAILY FOR 10 DAYS (Patient not taking: Reported on 8/23/2022) 30 tablet 0    gabapentin (NEURONTIN) 300 MG capsule TAKE 2 CAPSULES BY MOUTH THREE TIMES DAILY 90 capsule 0    acetaminophen (TYLENOL) 500 MG tablet Take 1 tablet by mouth 4 times daily as needed for Pain (Patient not taking: Reported on 8/23/2022) 60 tablet 0     No current facility-administered medications for this visit.         Allergies   Allergen Reactions    Carbamazepine Nausea Only and Nausea And Vomiting    Metoclopramide Other (See Comments)     Seizures      Phenytoin Sodium Extended Other (See Comments)     \"Causes seizure\"    Topiramate Nausea Only and Nausea And Vomiting     unknown    Verapamil Nausea Only and Nausea And Vomiting    Sulfamethoxazole-Trimethoprim Hives    Amoxicillin         REVIEW OF SYSTEMS:     Review of Systems   Constitutional:  Positive for fatigue. Negative for chills and diaphoresis. HENT:  Negative for congestion, sneezing, sore throat and trouble swallowing. Eyes:  Positive for visual disturbance. Respiratory:  Negative for apnea, choking, chest tightness, shortness of breath, wheezing and stridor. Cardiovascular:  Negative for chest pain, palpitations and leg swelling. Gastrointestinal:  Negative for abdominal distention, constipation, diarrhea and nausea. Endocrine: Negative for cold intolerance and heat intolerance. Musculoskeletal:  Positive for gait problem. Negative for arthralgias, back pain and joint swelling. Skin:  Negative for color change and pallor. Neurological:  Positive for headaches. Negative for dizziness, tremors, seizures, syncope, facial asymmetry, speech difficulty, weakness, light-headedness and numbness. Psychiatric/Behavioral:  Negative for agitation, behavioral problems, confusion, decreased concentration, dysphoric mood and hallucinations. The patient is nervous/anxious. The patient is not hyperactive. VITALS  /89   Pulse 67   Temp 97.3 °F (36.3 °C) (Temporal)   Resp 18   Ht 5' 2\" (1.575 m)   Wt 107 lb (48.5 kg)   SpO2 99%   BMI 19.57 kg/m²      PHYSICAL EXAMINATION:     Physical Exam  Vitals and nursing note reviewed. Constitutional:       General: She is awake. Appearance: Normal appearance. She is normal weight. HENT:      Head: Normocephalic and atraumatic. Right Ear: Hearing normal.      Left Ear: Hearing normal.   Eyes:      Extraocular Movements: Extraocular movements intact. Conjunctiva/sclera: Conjunctivae normal.      Pupils: Pupils are equal, round, and reactive to light.    Cardiovascular:      Rate and Rhythm: Normal rate and regular rhythm. Pulses: Normal pulses. Pulmonary:      Effort: Pulmonary effort is normal.      Breath sounds: Normal breath sounds. Abdominal:      General: Abdomen is flat. Bowel sounds are normal.      Palpations: Abdomen is soft. Musculoskeletal:         General: Normal range of motion. Cervical back: Normal range of motion and neck supple. Neurological:      Mental Status: She is alert. Coordination: Romberg sign negative. Deep Tendon Reflexes: Strength normal.      Reflex Scores:       Tricep reflexes are 2+ on the left side. Bicep reflexes are 2+ on the right side and 2+ on the left side. Brachioradialis reflexes are 2+ on the right side and 2+ on the left side. Patellar reflexes are 2+ on the right side and 2+ on the left side. Achilles reflexes are 2+ on the right side and 2+ on the left side. Psychiatric:         Mood and Affect: Mood normal.         Speech: Speech normal.         Thought Content: Thought content normal.        NEUROLOGICAL EXAMINATION:     Neurological Exam  Mental Status  Awake and alert. Oriented only to person, time and situation. Recalls 3 of 3 objects immediately. Speech is normal. Language is fluent with no aphasia. Able to perform serial calculations. Able to spell words backwards. Fund of knowledge is appropriate for level of education. Apraxia absent. Cranial Nerves  CN I: Sense of smell is normal.  CN II: Right visual acuity: Normal. Left visual acuity: Normal. Right normal visual field. Left normal visual field. CN III, IV, VI: Extraocular movements intact bilaterally. Pupils equal round and reactive to light bilaterally. CN V:  Right: Facial sensation is normal.  Left: Facial sensation is normal on the left. CN VII: Full and symmetric facial movement.   CN VIII:  Right: Hearing is normal.  Left: Hearing is normal.  CN IX, X:  Right: Palate is normal.  Left: Palate is normal.  CN XI:  Right: Sternocleidomastoid strength is normal.    Motor  Normal muscle bulk throughout. Normal muscle tone. Strength is 5/5 throughout all four extremities. Sensory  Light touch is normal in upper and lower extremities. Pinprick is normal in upper and lower extremities. Temperature is normal in upper and lower extremities. Vibration is normal in upper and lower extremities. Proprioception is normal in upper and lower extremities. Reflexes                                            Right                      Left  Brachioradialis                    2+                         2+  Biceps                                 2+                         2+  Triceps                                                        2+  Finger flex                                                   2+  Hamstring                                                    2+  Patellar                                2+                         2+  Achilles                                2+                         2+  Plantar                           Downgoing                Downgoing    Right pathological reflexes: Nataly's absent. Left pathological reflexes: Nataly's absent. Coordination  Right: Finger-to-nose normal. Rapid alternating movement normal. Heel-to-shin normal.Left: Finger-to-nose normal. Rapid alternating movement normal. Heel-to-shin normal.    Gait  Casual gait is normal including stance, stride, and arm swing. Normal toe walking. Normal heel walking. Tandem gait abnormality: Appears unsteady and leaning towards the right. Romberg is absent. ASSESSMENT / PLAN:     54-year-old female who presents here for evaluation of chronic vertigo, history of migraines, seizure disorder along with gait unsteadiness. Patient states that she had a's seizure 2 weeks ago and then 2 weeks before that. Not taking the appropriate medication which was adjusted to twice a day.   She was also found to have a UTI, recommended her to proceed with vestibular rehab for ongoing chronic tinnitus. She is seeing a behavioral therapist for her worsening anxiety, currently is on Xanax nightly. Would highly recommend the psychologist to address her concerns of her ongoing anxiety, perhaps being on Xanax for an extended period of time, she has developed a tolerance to the medication. She states that she is driving and she has been told according to [place] law not to drive for at least 6 months since her last seizure. Neurological-vertigo  -Continue to monitor for any neurological changes  -Meclizine as needed  -Vestibular rehab  -Previously seen by ENT, no abnormality noted  Seizure disorder  -Seizure precautions as directed  -Strongly recommended not to drive, avoid machinery and heights.  -Continue phenobarbital 64.8 twice daily  -Obtain phenobarbital level. Migraines  -Currently adequately controlled  -Continue Imitrex as needed  History of anxiety  -Will see behavioral therapist.  -Xanax as per primary team.      Ms. Kezia Echeverria received counseling on the following healthy behaviors: medical compliance, smoking cessation, blood pressure control, regular follow up with primary doctor.         Electronically signed by Mariola Gamboa MD on 8/23/2022 at 4:45 PM

## 2022-09-01 ENCOUNTER — OFFICE VISIT (OUTPATIENT)
Dept: PRIMARY CARE CLINIC | Age: 57
End: 2022-09-01
Payer: COMMERCIAL

## 2022-09-01 VITALS
WEIGHT: 109 LBS | SYSTOLIC BLOOD PRESSURE: 122 MMHG | HEART RATE: 61 BPM | DIASTOLIC BLOOD PRESSURE: 78 MMHG | HEIGHT: 62 IN | BODY MASS INDEX: 20.06 KG/M2 | OXYGEN SATURATION: 98 %

## 2022-09-01 DIAGNOSIS — G43.909 MIGRAINE WITHOUT STATUS MIGRAINOSUS, NOT INTRACTABLE, UNSPECIFIED MIGRAINE TYPE: ICD-10-CM

## 2022-09-01 DIAGNOSIS — Z13.6 ENCOUNTER FOR LIPID SCREENING FOR CARDIOVASCULAR DISEASE: ICD-10-CM

## 2022-09-01 DIAGNOSIS — Z13.220 ENCOUNTER FOR LIPID SCREENING FOR CARDIOVASCULAR DISEASE: ICD-10-CM

## 2022-09-01 DIAGNOSIS — R42 VERTIGO: ICD-10-CM

## 2022-09-01 DIAGNOSIS — M81.0 OSTEOPOROSIS, UNSPECIFIED OSTEOPOROSIS TYPE, UNSPECIFIED PATHOLOGICAL FRACTURE PRESENCE: Primary | ICD-10-CM

## 2022-09-01 DIAGNOSIS — Z00.00 ANNUAL PHYSICAL EXAM: ICD-10-CM

## 2022-09-01 DIAGNOSIS — G47.00 INSOMNIA, UNSPECIFIED TYPE: ICD-10-CM

## 2022-09-01 DIAGNOSIS — G40.909 SEIZURE DISORDER (HCC): ICD-10-CM

## 2022-09-01 PROBLEM — Z96.659 ARTIFICIAL KNEE JOINT PRESENT: Status: ACTIVE | Noted: 2022-09-01

## 2022-09-01 PROCEDURE — G8427 DOCREV CUR MEDS BY ELIG CLIN: HCPCS | Performed by: FAMILY MEDICINE

## 2022-09-01 PROCEDURE — 99214 OFFICE O/P EST MOD 30 MIN: CPT | Performed by: FAMILY MEDICINE

## 2022-09-01 PROCEDURE — 3017F COLORECTAL CA SCREEN DOC REV: CPT | Performed by: FAMILY MEDICINE

## 2022-09-01 PROCEDURE — G8420 CALC BMI NORM PARAMETERS: HCPCS | Performed by: FAMILY MEDICINE

## 2022-09-01 PROCEDURE — 1036F TOBACCO NON-USER: CPT | Performed by: FAMILY MEDICINE

## 2022-09-01 RX ORDER — PHENOBARBITAL 64.8 MG/1
64.8 TABLET ORAL 2 TIMES DAILY
Qty: 60 TABLET | Refills: 3 | Status: SHIPPED | OUTPATIENT
Start: 2022-09-01 | End: 2022-10-01

## 2022-09-01 RX ORDER — NORTRIPTYLINE HYDROCHLORIDE 50 MG/1
50 CAPSULE ORAL NIGHTLY
Qty: 30 CAPSULE | Refills: 5 | Status: SHIPPED | OUTPATIENT
Start: 2022-09-01

## 2022-09-01 RX ORDER — PHENOBARBITAL 64.8 MG/1
64.8 TABLET ORAL 2 TIMES DAILY
Qty: 180 TABLET | Refills: 1
Start: 2022-09-01 | End: 2022-10-01

## 2022-09-01 RX ORDER — ALPRAZOLAM 1 MG/1
1 TABLET ORAL NIGHTLY PRN
Qty: 60 TABLET | Refills: 0 | Status: SHIPPED | OUTPATIENT
Start: 2022-09-01 | End: 2022-09-22

## 2022-09-01 RX ORDER — ONDANSETRON 4 MG/1
4 TABLET, ORALLY DISINTEGRATING ORAL 3 TIMES DAILY PRN
Qty: 21 TABLET | Refills: 0 | Status: SHIPPED | OUTPATIENT
Start: 2022-09-01

## 2022-09-01 RX ORDER — SUMATRIPTAN 100 MG/1
100 TABLET, FILM COATED ORAL DAILY PRN
Qty: 9 TABLET | Refills: 5 | Status: SHIPPED | OUTPATIENT
Start: 2022-09-01

## 2022-09-01 SDOH — ECONOMIC STABILITY: FOOD INSECURITY: WITHIN THE PAST 12 MONTHS, THE FOOD YOU BOUGHT JUST DIDN'T LAST AND YOU DIDN'T HAVE MONEY TO GET MORE.: NEVER TRUE

## 2022-09-01 SDOH — ECONOMIC STABILITY: FOOD INSECURITY: WITHIN THE PAST 12 MONTHS, YOU WORRIED THAT YOUR FOOD WOULD RUN OUT BEFORE YOU GOT MONEY TO BUY MORE.: NEVER TRUE

## 2022-09-01 ASSESSMENT — PATIENT HEALTH QUESTIONNAIRE - PHQ9
SUM OF ALL RESPONSES TO PHQ QUESTIONS 1-9: 0
SUM OF ALL RESPONSES TO PHQ9 QUESTIONS 1 & 2: 0
1. LITTLE INTEREST OR PLEASURE IN DOING THINGS: 0
2. FEELING DOWN, DEPRESSED OR HOPELESS: 0
SUM OF ALL RESPONSES TO PHQ QUESTIONS 1-9: 0

## 2022-09-01 ASSESSMENT — ENCOUNTER SYMPTOMS
NAUSEA: 0
EYE REDNESS: 0
RHINORRHEA: 0
SORE THROAT: 0
VOMITING: 0
SHORTNESS OF BREATH: 0
WHEEZING: 0
COUGH: 0
DIARRHEA: 0
EYE DISCHARGE: 0
ABDOMINAL PAIN: 0

## 2022-09-01 ASSESSMENT — SOCIAL DETERMINANTS OF HEALTH (SDOH): HOW HARD IS IT FOR YOU TO PAY FOR THE VERY BASICS LIKE FOOD, HOUSING, MEDICAL CARE, AND HEATING?: NOT HARD AT ALL

## 2022-09-01 NOTE — PROGRESS NOTES
[address] [facility] PRIMARY CARE  [address]  [address] [address]  Dept: [id number] [facility] Yareli Kiser is a 61 y.o. female Established patient, who presents today for her medical conditions/complaints as noted below. Chief Complaint   Patient presents with    Back Pain    Migraine       HPI:     HPI  Pt states had two grand mal seizures.  and Friday before that. Seen in ER, diagnosed with UTI. Had no changes in medication. Sees neurology, seen one week ago. Pt states her anxiety level has been very high. Had eeg, lab ordered. Has not done as of yet. Pt had hip fracture in February. Patient states not have a pleasant experience with the neurologist and will be looking for a different 1. Denies any recent change in weight. Complaining mostly of her vertigo getting worse. Patient states is scheduled to see a counselor. Does not want to be on medication at this time. Patient is wondering whether or not her worsening of her dizziness is secondary to migraine.     Reviewed prior notes Neurology  Reviewed previous Labs and Imaging    LDL Cholesterol (mg/dL)   Date Value   2019 162 (H)       (goal LDL is <100)   AST (U/L)   Date Value   2021 14     ALT (U/L)   Date Value   2021 15     BUN (mg/dL)   Date Value   2022 7     TSH (mIU/L)   Date Value   2021 0.89     BP Readings from Last 3 Encounters:   22 122/78   22 139/89   22 113/81          (goal 120/80)    Past Medical History:   Diagnosis Date    Migraines     Seizures ([facility] [address]       Past Surgical History:   Procedure Laterality Date     SECTION      CHOLECYSTECTOMY, LAPAROSCOPIC      HIP ARTHROPLASTY Right 2022    TOTAL HIP ARTHROPLASTY  BEACH & NEPHEW (Right )    HIP SURGERY Right 2022    TOTAL HIP ARTHROPLASTY  BEACH & NEPHEW performed by Carina Salas DO at [address] [place] [address], TOTAL ABDOMINAL (CERVIX REMOVED)      MANDIBLE FRACTURE SURGERY      SHOULDER SURGERY Left 1987    TOTAL KNEE ARTHROPLASTY Right 12/12/2019    TOTAL KNEE ARTHROPLASTY Left 07/13/2017       Family History   Problem Relation Age of Onset    Cancer Mother     Heart Disease Mother     Cancer Brother     Cancer Maternal Grandmother        Social History     Tobacco Use    Smoking status: Never    Smokeless tobacco: Never   Substance Use Topics    Alcohol use: Yes     Comment: Socially      Current Outpatient Medications   Medication Sig Dispense Refill    PHENobarbital (LUMINAL) 64.8 MG tablet Take 1 tablet by mouth 2 times daily for 30 days. 180 tablet 1    nortriptyline (PAMELOR) 50 MG capsule Take 1 capsule by mouth nightly 30 capsule 5    ondansetron (ZOFRAN-ODT) 4 MG disintegrating tablet Take 1 tablet by mouth 3 times daily as needed for Nausea or Vomiting 21 tablet 0    SUMAtriptan (IMITREX) 100 MG tablet Take 1 tablet by mouth daily as needed for Migraine TAKE 2 TABLETS BY MOUTH DAILY AS NEEDED FOR HEADACHE 9 tablet 5    ibuprofen (ADVIL;MOTRIN) 800 MG tablet TK 1 T PO TID  tablet 2     No current facility-administered medications for this visit.      Allergies   Allergen Reactions    Carbamazepine Nausea Only and Nausea And Vomiting    Metoclopramide Other (See Comments)     Seizures      Phenytoin Sodium Extended Other (See Comments)     \"Causes seizure\"    Topiramate Nausea Only and Nausea And Vomiting     unknown    Verapamil Nausea Only and Nausea And Vomiting    Sulfamethoxazole-Trimethoprim Hives    Amoxicillin        Health Maintenance   Topic Date Due    DTaP/Tdap/Td vaccine (2 - Td or Tdap) 09/13/2012    Shingles vaccine (1 of 2) Never done    Breast cancer screen  03/12/2022    COVID-19 Vaccine (3 - Booster for Pfizer series) 06/23/2022    Depression Screen  07/06/2022    Flu vaccine (1) 09/01/2022    Lipids  08/14/2024    Colorectal Cancer Screen  09/18/2029    Hepatitis C screen  Completed    Hepatitis A vaccine  Aged Out    Hepatitis B Mood and Affect: Mood normal.         Behavior: Behavior normal.         Thought Content: Thought content normal.       Assessment:       Diagnosis Orders   1. Osteoporosis, unspecified osteoporosis type, unspecified pathological fracture presence  DEXA BONE DENSITY AXIAL SKELETON      2. Vertigo  MRI BRAIN W CONTRAST      3. Seizure disorder ([address]  MRI BRAIN W CONTRAST      4. Encounter for lipid screening for cardiovascular disease  Lipid, Fasting      5. Annual physical exam  Basic Metabolic Panel, Fasting    Hepatic Function Panel      6. Migraine without status migrainosus, not intractable, unspecified migraine type  PHENobarbital (LUMINAL) 64.8 MG tablet           Plan:   MRI brain ordered  DEXA scan ordered with patient having history fall from standing position with hip fracture  States was told she had osteoporosis before  Trial of Pamelor 50 mg at bedtime for migraine prophylaxis and to see if whether or not this helps with her vertigo  Blood work ordered    Return in about 6 months (around 3/1/2023). Orders Placed This Encounter   Procedures    DEXA BONE DENSITY AXIAL SKELETON     Standing Status:   Future     Standing Expiration Date:   9/1/2023     Order Specific Question:   Reason for exam:     Answer:   hx hip fracture    MRI BRAIN W CONTRAST     Standing Status:   Future     Standing Expiration Date:   9/1/2023     Order Specific Question:   STAT Creatinine as needed:     Answer:   Yes     Order Specific Question:   Reason for exam:     Answer:   dizziness and worsening vertigo     Order Specific Question:   What is the sedation requirement?      Answer:   None    Lipid, Fasting     Standing Status:   Future     Standing Expiration Date:   8/4/1019    Basic Metabolic Panel, Fasting     Standing Status:   Future     Standing Expiration Date:   9/1/2023    Hepatic Function Panel     Standing Status:   Future     Standing Expiration Date:   9/1/2023     Orders Placed This Encounter   Medications PHENobarbital (LUMINAL) 64.8 MG tablet     Sig: Take 1 tablet by mouth 2 times daily for 30 days. Dispense:  180 tablet     Refill:  1    nortriptyline (PAMELOR) 50 MG capsule     Sig: Take 1 capsule by mouth nightly     Dispense:  30 capsule     Refill:  5       Patient given educational materials - see patient instructions. Discussed use, benefit, and side effects of prescribed medications. All patient questions answered. Pt voiced understanding. Reviewed health maintenance. Instructed to continue current medications, diet andexercise. Patient agreed with treatment plan. Follow up as directed.      Electronicallysigned by Jaycee Irving MD on 9/1/2022 at 10:48 AM

## 2022-11-14 DIAGNOSIS — G47.00 INSOMNIA, UNSPECIFIED TYPE: ICD-10-CM

## 2022-11-14 RX ORDER — ALPRAZOLAM 1 MG/1
TABLET ORAL
Qty: 60 TABLET | Refills: 0 | Status: SHIPPED | OUTPATIENT
Start: 2022-11-14 | End: 2022-12-14

## 2022-12-14 ENCOUNTER — OFFICE VISIT (OUTPATIENT)
Dept: PRIMARY CARE CLINIC | Age: 57
End: 2022-12-14
Payer: COMMERCIAL

## 2022-12-14 VITALS
SYSTOLIC BLOOD PRESSURE: 132 MMHG | DIASTOLIC BLOOD PRESSURE: 78 MMHG | HEIGHT: 62 IN | WEIGHT: 110.2 LBS | HEART RATE: 61 BPM | OXYGEN SATURATION: 96 % | BODY MASS INDEX: 20.28 KG/M2

## 2022-12-14 DIAGNOSIS — G40.909 SEIZURE DISORDER (HCC): ICD-10-CM

## 2022-12-14 DIAGNOSIS — M75.122 NONTRAUMATIC COMPLETE TEAR OF LEFT ROTATOR CUFF: Primary | ICD-10-CM

## 2022-12-14 DIAGNOSIS — Z13.220 ENCOUNTER FOR LIPID SCREENING FOR CARDIOVASCULAR DISEASE: ICD-10-CM

## 2022-12-14 DIAGNOSIS — Z13.6 ENCOUNTER FOR LIPID SCREENING FOR CARDIOVASCULAR DISEASE: ICD-10-CM

## 2022-12-14 DIAGNOSIS — G47.00 INSOMNIA, UNSPECIFIED TYPE: ICD-10-CM

## 2022-12-14 DIAGNOSIS — D64.9 ANEMIA, UNSPECIFIED TYPE: ICD-10-CM

## 2022-12-14 DIAGNOSIS — G43.909 MIGRAINE WITHOUT STATUS MIGRAINOSUS, NOT INTRACTABLE, UNSPECIFIED MIGRAINE TYPE: ICD-10-CM

## 2022-12-14 DIAGNOSIS — Z01.818 PREOP EXAMINATION: ICD-10-CM

## 2022-12-14 LAB
ABSOLUTE EOS #: 0.08 K/UL (ref 0–0.44)
ABSOLUTE IMMATURE GRANULOCYTE: <0.03 K/UL (ref 0–0.3)
ABSOLUTE LYMPH #: 1.58 K/UL (ref 1.1–3.7)
ABSOLUTE MONO #: 0.26 K/UL (ref 0.1–1.2)
BASOPHILS # BLD: 1 % (ref 0–2)
BASOPHILS ABSOLUTE: 0.04 K/UL (ref 0–0.2)
CHOLESTEROL, FASTING: 327 MG/DL
CHOLESTEROL/HDL RATIO: 3.4
EOSINOPHILS RELATIVE PERCENT: 2 % (ref 1–4)
FERRITIN: 64 NG/ML (ref 13–150)
HCT VFR BLD CALC: 44.7 % (ref 36.3–47.1)
HDLC SERPL-MCNC: 97 MG/DL
HEMOGLOBIN: 14.5 G/DL (ref 11.9–15.1)
IMMATURE GRANULOCYTES: 0 %
IRON SATURATION: 22 % (ref 20–55)
IRON: 65 UG/DL (ref 37–145)
LDL CHOLESTEROL: 211 MG/DL (ref 0–130)
LYMPHOCYTES # BLD: 33 % (ref 24–43)
MCH RBC QN AUTO: 29.5 PG (ref 25.2–33.5)
MCHC RBC AUTO-ENTMCNC: 32.4 G/DL (ref 28.4–34.8)
MCV RBC AUTO: 90.9 FL (ref 82.6–102.9)
MONOCYTES # BLD: 5 % (ref 3–12)
NRBC AUTOMATED: 0 PER 100 WBC
PDW BLD-RTO: 13.4 % (ref 11.8–14.4)
PHENOBARBITAL DATE LAST DOSE: ABNORMAL
PHENOBARBITAL DOSE AMOUNT: ABNORMAL
PHENOBARBITAL TIME LAST DOSE: ABNORMAL
PHENOBARBITAL: 10.4 UG/ML (ref 15–40)
PLATELET # BLD: 298 K/UL (ref 138–453)
PMV BLD AUTO: 9.8 FL (ref 8.1–13.5)
RBC # BLD: 4.92 M/UL (ref 3.95–5.11)
SEG NEUTROPHILS: 59 % (ref 36–65)
SEGMENTED NEUTROPHILS ABSOLUTE COUNT: 2.89 K/UL (ref 1.5–8.1)
TOTAL IRON BINDING CAPACITY: 293 UG/DL (ref 250–450)
TRIGLYCERIDE, FASTING: 96 MG/DL
UNSATURATED IRON BINDING CAPACITY: 228 UG/DL (ref 112–347)
VITAMIN B-12: 493 PG/ML (ref 232–1245)
WBC # BLD: 4.9 K/UL (ref 3.5–11.3)

## 2022-12-14 PROCEDURE — G8420 CALC BMI NORM PARAMETERS: HCPCS | Performed by: FAMILY MEDICINE

## 2022-12-14 PROCEDURE — 99214 OFFICE O/P EST MOD 30 MIN: CPT | Performed by: FAMILY MEDICINE

## 2022-12-14 PROCEDURE — 3017F COLORECTAL CA SCREEN DOC REV: CPT | Performed by: FAMILY MEDICINE

## 2022-12-14 PROCEDURE — 93000 ELECTROCARDIOGRAM COMPLETE: CPT | Performed by: FAMILY MEDICINE

## 2022-12-14 PROCEDURE — 1036F TOBACCO NON-USER: CPT | Performed by: FAMILY MEDICINE

## 2022-12-14 PROCEDURE — G8484 FLU IMMUNIZE NO ADMIN: HCPCS | Performed by: FAMILY MEDICINE

## 2022-12-14 PROCEDURE — G8427 DOCREV CUR MEDS BY ELIG CLIN: HCPCS | Performed by: FAMILY MEDICINE

## 2022-12-14 RX ORDER — ALPRAZOLAM 1 MG/1
1 TABLET ORAL NIGHTLY PRN
Qty: 90 TABLET | Refills: 1 | Status: SHIPPED | OUTPATIENT
Start: 2022-12-14 | End: 2023-03-14

## 2022-12-14 RX ORDER — SUMATRIPTAN 100 MG/1
100 TABLET, FILM COATED ORAL DAILY PRN
Qty: 9 TABLET | Refills: 5 | Status: SHIPPED | OUTPATIENT
Start: 2022-12-14

## 2022-12-14 RX ORDER — PHENOBARBITAL 64.8 MG/1
64.8 TABLET ORAL 2 TIMES DAILY
Qty: 180 TABLET | Refills: 1 | Status: SHIPPED | OUTPATIENT
Start: 2022-12-14 | End: 2023-03-14

## 2022-12-14 ASSESSMENT — ENCOUNTER SYMPTOMS
COUGH: 0
WHEEZING: 0
DIARRHEA: 0
EYE DISCHARGE: 0
SHORTNESS OF BREATH: 0
VOMITING: 0
RHINORRHEA: 0
EYE REDNESS: 0
SORE THROAT: 0
NAUSEA: 0
ABDOMINAL PAIN: 0

## 2022-12-14 NOTE — PROGRESS NOTES
[address] [facility] PRIMARY CARE  [address] [place] [address] [address] [address]  Dept: [id number] is a 61 y.o. female Established patient, who presents today for her medical conditions/complaints as noted below. Chief Complaint   Patient presents with    Shoulder Pain     Left- rotator cuff surgery Dr. Catalina Abebe    Flu Vaccine     Declined       HPI:     HPI  Pt has mri brain, and eeg scheduled for seizures. Has not had a seizure for a few months. Patient currently scheduled for left rotator cuff repair. States has been having ongoing pain. Denies any recent trauma. Patient  continues to have occasional seizures. Having 4 or 5/year. Patient refuses to see neurology. States only wants to deal with primary care. Blood work was reviewed from September. Was noted to have an anemia but nonspecific. Patient denies any excessive fatigue. Patient  has not been taking Pamelor. Patient has had no issues with anesthesia in the past.  Has had multiple orthopedic surgeries. Has no difficulty waking up. Denies any excessive sedation or nausea with anesthesia. Patient has no history of chronic heart disease or chronic lung disease.     Reviewed prior notes None  Reviewed previous Labs    LDL Cholesterol (mg/dL)   Date Value   2019 162 (H)       (goal LDL is <100)   AST (U/L)   Date Value   2021 14     ALT (U/L)   Date Value   2021 15     BUN (mg/dL)   Date Value   2022 7     TSH (mIU/L)   Date Value   2021 0.89     BP Readings from Last 3 Encounters:   22 132/78   22 122/78   22 139/89          (goal 120/80)    Past Medical History:   Diagnosis Date    Migraines     Seizures ([facility] [address]       Past Surgical History:   Procedure Laterality Date     SECTION      CHOLECYSTECTOMY, LAPAROSCOPIC      HIP ARTHROPLASTY Right 2022    TOTAL HIP ARTHROPLASTY  BEACH & NEPHEW (Right )    HIP SURGERY Right 08/01/2022    Depression Screen  09/01/2023    Lipids  08/14/2024    Colorectal Cancer Screen  09/18/2029    Hepatitis C screen  Completed    Hepatitis A vaccine  Aged Out    Hib vaccine  Aged Out    Meningococcal (ACWY) vaccine  Aged Out    Pneumococcal 0-64 years Vaccine  Aged Out    HIV screen  Discontinued       Subjective:      Review of Systems   Constitutional:  Negative for chills and fever. HENT:  Negative for rhinorrhea and sore throat. Eyes:  Negative for discharge and redness. Respiratory:  Negative for cough, shortness of breath and wheezing. Cardiovascular:  Negative for chest pain and palpitations. Gastrointestinal:  Negative for abdominal pain, diarrhea, nausea and vomiting. Genitourinary:  Negative for dysuria and frequency. Musculoskeletal:  Negative for arthralgias and myalgias. Neurological:  Negative for dizziness, light-headedness and headaches. Psychiatric/Behavioral:  Negative for sleep disturbance. Objective:     /78   Pulse 61   Ht 5' 2.4\" (1.585 m)   Wt 110 lb 3.2 oz (50 kg)   SpO2 96%   BMI 19.90 kg/m²   Physical Exam  Vitals and nursing note reviewed. Constitutional:       General: She is not in acute distress. Appearance: She is well-developed. She is not ill-appearing. HENT:      Head: Normocephalic and atraumatic. Right Ear: External ear normal.      Left Ear: External ear normal.   Eyes:      General: No scleral icterus. Right eye: No discharge. Left eye: No discharge. Conjunctiva/sclera: Conjunctivae normal.   Neck:      Thyroid: No thyromegaly. Trachea: No tracheal deviation. Cardiovascular:      Rate and Rhythm: Normal rate and regular rhythm. Heart sounds: Normal heart sounds. Pulmonary:      Effort: Pulmonary effort is normal. No respiratory distress. Breath sounds: Normal breath sounds. No wheezing. Lymphadenopathy:      Cervical: No cervical adenopathy. Skin:     General: Skin is warm. Findings: No rash. Neurological:      Mental Status: She is alert and oriented to person, place, and time. Psychiatric:         Mood and Affect: Mood normal.         Behavior: Behavior normal.         Thought Content: Thought content normal.       Assessment:       Diagnosis Orders   1. Nontraumatic complete tear of left rotator cuff  EKG 12 Lead      2. Migraine without status migrainosus, not intractable, unspecified migraine type  PHENobarbital (LUMINAL) 64.8 MG tablet      3. Seizure disorder (HCC)  Phenobarbital Level      4. Anemia, unspecified type  CBC with Auto Differential    Ferritin    Iron and TIBC    Vitamin B12      5. Encounter for lipid screening for cardiovascular disease  Lipid, Fasting      6. Preop examination  EKG 12 Lead      7. Insomnia, unspecified type  ALPRAZolam (XANAX) 1 MG tablet           Plan:   Blood work ordered for anemia  Baseline EKG  Check phenobarb level  Renew medications  Patient medically cleared for surgery    Return in about 6 months (around 6/14/2023). Orders Placed This Encounter   Procedures    CBC with Auto Differential     Standing Status:   Future     Standing Expiration Date:   12/14/2023    Ferritin     Standing Status:   Future     Standing Expiration Date:   12/14/2023    Iron and TIBC     Standing Status:   Future     Standing Expiration Date:   12/14/2023     Order Specific Question:   Is Patient Fasting? Answer:   yes     Order Specific Question:   No of Hours? Answer:   10    Vitamin B12     Standing Status:   Future     Standing Expiration Date:   12/14/2023    Phenobarbital Level     Standing Status:   Future     Standing Expiration Date:   12/14/2023     Order Specific Question:   Dose Schedule & Time of Last Dose?      Answer:   ask patient    Lipid, Fasting     Standing Status:   Future     Standing Expiration Date:   12/14/2023    EKG 12 Lead     Order Specific Question:   Reason for Exam?     Answer:   Pre-op     Orders Placed This

## 2022-12-15 ENCOUNTER — HOSPITAL ENCOUNTER (OUTPATIENT)
Dept: MRI IMAGING | Age: 57
Discharge: HOME | End: 2022-12-17
Payer: COMMERCIAL

## 2022-12-15 ENCOUNTER — HOSPITAL ENCOUNTER (OUTPATIENT)
Dept: NEUROLOGY | Age: 57
Discharge: HOME | End: 2022-12-15
Payer: COMMERCIAL

## 2022-12-15 DIAGNOSIS — G40.909 NONINTRACTABLE EPILEPSY WITHOUT STATUS EPILEPTICUS, UNSPECIFIED EPILEPSY TYPE (HCC): ICD-10-CM

## 2022-12-15 DIAGNOSIS — R42 VERTIGO: ICD-10-CM

## 2022-12-15 DIAGNOSIS — G40.909 SEIZURE DISORDER (HCC): ICD-10-CM

## 2022-12-15 PROCEDURE — 6360000004 HC RX CONTRAST MEDICATION: Performed by: FAMILY MEDICINE

## 2022-12-15 PROCEDURE — 95816 EEG AWAKE AND DROWSY: CPT

## 2022-12-15 PROCEDURE — 70553 MRI BRAIN STEM W/O & W/DYE: CPT

## 2022-12-15 PROCEDURE — A9579 GAD-BASE MR CONTRAST NOS,1ML: HCPCS | Performed by: FAMILY MEDICINE

## 2022-12-15 RX ADMIN — GADOTERIDOL 8 ML: 279.3 INJECTION, SOLUTION INTRAVENOUS at 12:37

## 2022-12-16 ENCOUNTER — PROCEDURE VISIT (OUTPATIENT)
Dept: NEUROLOGY | Age: 57
End: 2022-12-16

## 2022-12-16 DIAGNOSIS — G40.909 SEIZURE DISORDER (HCC): Primary | ICD-10-CM

## 2022-12-16 NOTE — PROGRESS NOTES
EEG REPORT       Patient: Al Mejia Age: 61 y.o. MRN: [id number]    Date: 12/16/2022  Referring Provider: No ref. provider found    History: This routine 23 minute scalp EEG was recorded with video- monitoring for a 61 y.o. [place] female  who presented with encephalopathy. This EEG was performed to evaluate for focal and epileptiform abnormalities. Mariam Mckinney   Current Outpatient Medications   Medication Sig Dispense Refill    PHENobarbital (LUMINAL) 64.8 MG tablet Take 1 tablet by mouth 2 times daily for 90 days. 180 tablet 1    SUMAtriptan (IMITREX) 100 MG tablet Take 1 tablet by mouth daily as needed for Migraine TAKE 2 TABLETS BY MOUTH DAILY AS NEEDED FOR HEADACHE 9 tablet 5    ALPRAZolam (XANAX) 1 MG tablet Take 1 tablet by mouth nightly as needed for Sleep for up to 90 days. 90 tablet 1    ondansetron (ZOFRAN-ODT) 4 MG disintegrating tablet Take 1 tablet by mouth 3 times daily as needed for Nausea or Vomiting 21 tablet 0    ibuprofen (ADVIL;MOTRIN) 800 MG tablet TK 1 T PO TID  tablet 2     No current facility-administered medications for this visit. Technical Description: This is a 21 channel digital EEG recording with time-locked video. Electrodes were placed in accordance with the 10-20 International System of Electrode Placement. Single lead EKG monitoring as well as temporal electrodes were included. The patient was not sleep deprived. This recording was obtained during wakefulness. EEG Description: The dominant background activity during maximal recorded wakefulness consisted of bioccipitally dominant 9-10 Hz, 25-35 uV symmetric, regular activity that was reactive to eye opening. During drowsiness, the background rhythm waxed and waned and there were periods of slowing. There was appropriate diffuse delta activity during slow wave sleep.     Photic stimulation - stepwise photic stimulation at 2-30 Hz was performed and there was a biposterior, symmetric, driving response. Hyperventilation - was not preformed. No abnormalities were activated by photic stimulation     The EKG channel demonstrated a normal sinus rhythm. Interpretation  This EEG was normal in wakefulness and sleep. Clinical correlation  This EEG was normal. No focal or epileptiform abnormalities were seen. If clinical suspicion of continued seizure activity is suspected then repeat EEG or LTM EEG may be of benefit. Clinical correlation recommended.     Danielle López MD  Diplomate, American Board of Psychiatry and Neurology

## 2022-12-16 NOTE — RESULT ENCOUNTER NOTE
Advise pt chol very high, increasing risk of heart attack and stroke.   Recommend starting crestor 10mg daily  Still anemic but stable

## 2023-01-20 ENCOUNTER — TELEPHONE (OUTPATIENT)
Dept: PRIMARY CARE CLINIC | Age: 58
End: 2023-01-20

## 2023-01-20 NOTE — TELEPHONE ENCOUNTER
Patient called office states \"the last couple of days\" she felt like she had seizure. Patient states shaking, uncontrollably. Patient states today /101 and 150/100. Patient does not take BP medication. Patient states hx of seizures. Patient states today slight shaking not as bad as yesterday.    Patient states she did not check BP all other days.    Please advise       Pt will like call back at work number if she does not answer [employer] [phone]

## 2023-01-21 ENCOUNTER — APPOINTMENT (OUTPATIENT)
Dept: GENERAL RADIOLOGY | Age: 58
End: 2023-01-21
Payer: COMMERCIAL

## 2023-01-21 ENCOUNTER — NURSE TRIAGE (OUTPATIENT)
Dept: OTHER | Facility: CLINIC | Age: 58
End: 2023-01-21

## 2023-01-21 ENCOUNTER — HOSPITAL ENCOUNTER (EMERGENCY)
Age: 58
Discharge: HOME | End: 2023-01-21
Attending: EMERGENCY MEDICINE
Payer: COMMERCIAL

## 2023-01-21 ENCOUNTER — APPOINTMENT (OUTPATIENT)
Dept: CT IMAGING | Age: 58
End: 2023-01-21
Payer: COMMERCIAL

## 2023-01-21 VITALS
HEART RATE: 74 BPM | RESPIRATION RATE: 10 BRPM | OXYGEN SATURATION: 99 % | SYSTOLIC BLOOD PRESSURE: 136 MMHG | TEMPERATURE: 98.2 F | DIASTOLIC BLOOD PRESSURE: 100 MMHG

## 2023-01-21 DIAGNOSIS — R53.82 CHRONIC FATIGUE: ICD-10-CM

## 2023-01-21 DIAGNOSIS — R42 VERTIGO: Primary | ICD-10-CM

## 2023-01-21 LAB
ABSOLUTE EOS #: 0.2 K/UL (ref 0–0.4)
ABSOLUTE LYMPH #: 1.6 K/UL (ref 1–4.8)
ABSOLUTE MONO #: 0.4 K/UL (ref 0.1–1.2)
ALBUMIN SERPL-MCNC: 3.9 G/DL (ref 3.5–5.2)
ALBUMIN/GLOBULIN RATIO: 1.6 (ref 1–2.5)
ALP BLD-CCNC: 83 U/L (ref 35–104)
ALT SERPL-CCNC: 17 U/L (ref 5–33)
ANION GAP SERPL CALCULATED.3IONS-SCNC: 10 MMOL/L (ref 9–17)
AST SERPL-CCNC: 19 U/L
BACTERIA: ABNORMAL
BASOPHILS # BLD: 1 % (ref 0–2)
BASOPHILS ABSOLUTE: 0 K/UL (ref 0–0.2)
BILIRUB SERPL-MCNC: 0.3 MG/DL (ref 0.3–1.2)
BILIRUBIN URINE: NEGATIVE
BUN BLDV-MCNC: 7 MG/DL (ref 6–20)
CALCIUM SERPL-MCNC: 9.1 MG/DL (ref 8.6–10.4)
CHLORIDE BLD-SCNC: 101 MMOL/L (ref 98–107)
CO2: 25 MMOL/L (ref 20–31)
COLOR: YELLOW
CREAT SERPL-MCNC: 0.42 MG/DL (ref 0.5–0.9)
D-DIMER QUANTITATIVE: 0.45 MG/L FEU
EKG ATRIAL RATE: 71 BPM
EKG P AXIS: 41 DEGREES
EKG P-R INTERVAL: 106 MS
EKG Q-T INTERVAL: 396 MS
EKG QRS DURATION: 76 MS
EKG QTC CALCULATION (BAZETT): 430 MS
EKG R AXIS: 52 DEGREES
EKG T AXIS: 61 DEGREES
EKG VENTRICULAR RATE: 71 BPM
EOSINOPHILS RELATIVE PERCENT: 5 % (ref 1–4)
EPITHELIAL CELLS UA: ABNORMAL /HPF (ref 0–5)
FLU A ANTIGEN: NEGATIVE
FLU B ANTIGEN: NEGATIVE
GFR SERPL CREATININE-BSD FRML MDRD: >60 ML/MIN/1.73M2
GLUCOSE BLD-MCNC: 93 MG/DL (ref 70–99)
GLUCOSE URINE: NEGATIVE
HCT VFR BLD CALC: 41.2 % (ref 36–46)
HEMOGLOBIN: 14.1 G/DL (ref 12–16)
KETONES, URINE: NEGATIVE
LEUKOCYTE ESTERASE, URINE: ABNORMAL
LYMPHOCYTES # BLD: 36 % (ref 24–44)
MAGNESIUM: 1.9 MG/DL (ref 1.6–2.6)
MCH RBC QN AUTO: 29.8 PG (ref 26–34)
MCHC RBC AUTO-ENTMCNC: 34.1 G/DL (ref 31–37)
MCV RBC AUTO: 87.3 FL (ref 80–100)
MONOCYTES # BLD: 8 % (ref 2–11)
NITRITE, URINE: NEGATIVE
OTHER OBSERVATIONS UA: ABNORMAL
PDW BLD-RTO: 13.9 % (ref 12.5–15.4)
PH UA: 6 (ref 5–8)
PLATELET # BLD: 274 K/UL (ref 140–450)
PMV BLD AUTO: 7.4 FL (ref 6–12)
POTASSIUM SERPL-SCNC: 3.5 MMOL/L (ref 3.7–5.3)
PROTEIN UA: NEGATIVE
RBC # BLD: 4.72 M/UL (ref 4–5.2)
RBC UA: ABNORMAL /HPF (ref 0–2)
SARS-COV-2, RAPID: NOT DETECTED
SEG NEUTROPHILS: 50 % (ref 36–66)
SEGMENTED NEUTROPHILS ABSOLUTE COUNT: 2.2 K/UL (ref 1.8–7.7)
SODIUM BLD-SCNC: 136 MMOL/L (ref 135–144)
SPECIFIC GRAVITY UA: 1.01 (ref 1–1.03)
SPECIMEN DESCRIPTION: NORMAL
TOTAL PROTEIN: 6.3 G/DL (ref 6.4–8.3)
TROPONIN, HIGH SENSITIVITY: <6 NG/L (ref 0–14)
TSH SERPL DL<=0.05 MIU/L-ACNC: 1.76 UIU/ML (ref 0.3–5)
TURBIDITY: CLEAR
URINE HGB: NEGATIVE
UROBILINOGEN, URINE: NORMAL
WBC # BLD: 4.4 K/UL (ref 3.5–11)
WBC UA: ABNORMAL /HPF (ref 0–5)

## 2023-01-21 PROCEDURE — 70450 CT HEAD/BRAIN W/O DYE: CPT

## 2023-01-21 PROCEDURE — 6370000000 HC RX 637 (ALT 250 FOR IP): Performed by: NURSE PRACTITIONER

## 2023-01-21 PROCEDURE — 81001 URINALYSIS AUTO W/SCOPE: CPT

## 2023-01-21 PROCEDURE — 36415 COLL VENOUS BLD VENIPUNCTURE: CPT

## 2023-01-21 PROCEDURE — 2580000003 HC RX 258: Performed by: NURSE PRACTITIONER

## 2023-01-21 PROCEDURE — 93005 ELECTROCARDIOGRAM TRACING: CPT | Performed by: NURSE PRACTITIONER

## 2023-01-21 PROCEDURE — 83735 ASSAY OF MAGNESIUM: CPT

## 2023-01-21 PROCEDURE — 99285 EMERGENCY DEPT VISIT HI MDM: CPT

## 2023-01-21 PROCEDURE — 87635 SARS-COV-2 COVID-19 AMP PRB: CPT

## 2023-01-21 PROCEDURE — 87804 INFLUENZA ASSAY W/OPTIC: CPT

## 2023-01-21 PROCEDURE — 84443 ASSAY THYROID STIM HORMONE: CPT

## 2023-01-21 PROCEDURE — 71045 X-RAY EXAM CHEST 1 VIEW: CPT

## 2023-01-21 PROCEDURE — 84484 ASSAY OF TROPONIN QUANT: CPT

## 2023-01-21 PROCEDURE — 85379 FIBRIN DEGRADATION QUANT: CPT

## 2023-01-21 PROCEDURE — 85025 COMPLETE CBC W/AUTO DIFF WBC: CPT

## 2023-01-21 PROCEDURE — 80053 COMPREHEN METABOLIC PANEL: CPT

## 2023-01-21 RX ORDER — 0.9 % SODIUM CHLORIDE 0.9 %
1000 INTRAVENOUS SOLUTION INTRAVENOUS ONCE
Status: COMPLETED | OUTPATIENT
Start: 2023-01-21 | End: 2023-01-21

## 2023-01-21 RX ORDER — ACETAMINOPHEN 500 MG
1000 TABLET ORAL ONCE
Status: COMPLETED | OUTPATIENT
Start: 2023-01-21 | End: 2023-01-21

## 2023-01-21 RX ORDER — MECLIZINE HCL 12.5 MG/1
12.5 TABLET ORAL ONCE
Status: COMPLETED | OUTPATIENT
Start: 2023-01-21 | End: 2023-01-21

## 2023-01-21 RX ADMIN — MECLIZINE 12.5 MG: 12.5 TABLET ORAL at 20:32

## 2023-01-21 RX ADMIN — SODIUM CHLORIDE 1000 ML: 9 INJECTION, SOLUTION INTRAVENOUS at 19:45

## 2023-01-21 RX ADMIN — ACETAMINOPHEN 1000 MG: 500 TABLET ORAL at 22:15

## 2023-01-21 ASSESSMENT — PAIN - FUNCTIONAL ASSESSMENT: PAIN_FUNCTIONAL_ASSESSMENT: 0-10

## 2023-01-21 ASSESSMENT — PAIN SCALES - GENERAL: PAINLEVEL_OUTOF10: 5

## 2023-01-21 ASSESSMENT — ENCOUNTER SYMPTOMS
ABDOMINAL DISTENTION: 0
DIARRHEA: 1
SHORTNESS OF BREATH: 1
RECTAL PAIN: 0
CONSTIPATION: 0
APNEA: 0
VOMITING: 0
WHEEZING: 0
BLOOD IN STOOL: 0
CHOKING: 0
COUGH: 0
STRIDOR: 0
NAUSEA: 0
ANAL BLEEDING: 0
CHEST TIGHTNESS: 1
ABDOMINAL PAIN: 0
EYES NEGATIVE: 1

## 2023-01-21 ASSESSMENT — VISUAL ACUITY
OU: 20/50
OS: 20/70
OD: 20/50

## 2023-01-21 ASSESSMENT — PAIN DESCRIPTION - LOCATION: LOCATION: HEAD

## 2023-01-21 NOTE — TELEPHONE ENCOUNTER
Location of patient: [place]    Subjective: Caller states \"my BP has been up\"     Current Symptoms: BP currently 167/111. History of elevated bps in the past but self resolves as it's due to pain or brief anomaly. For the past 2 days it's been up. Per PCP yesterday was told to go to ED if worsens. BP is higher today and she is not as shaky as she was yesterday. Now fatigued and some blurred vision. Denies chest pain but has a heaviness but not like something sitting on it. Feels like she is working to take a deep breath. Onset: 2 days ago; gradual    Associated Symptoms: NA    Pain Severity: 0/10; N/A; none, pressure    Temperature: no fever     What has been tried: nothing    LMP: NA Pregnant: NA    Recommended disposition: Go to ED Now    Care advice provided, patient verbalizes understanding; denies any other questions or concerns; instructed to call back for any new or worsening symptoms. Patient/caller agrees to proceed to nearest Emergency Department    This triage is a result of a call to [address] [place] [address]. Please do not respond to the triage nurse through this encounter. Any subsequent communication should be directly with the patient.     Reason for Disposition   [0] Systolic BP  >= 011 OR Diastolic >= 971 AND [5] cardiac or neurologic symptoms (e.g., chest pain, difficulty breathing, unsteady gait, blurred vision)    Protocols used: Blood Pressure - High-ADULT-

## 2023-01-22 NOTE — ED PROVIDER NOTES
[facility] Emergency Department  [address] [facility] [address] [address]  Phone: [phone]  Fax: [phone]        Pt Name: Ilir Rosado  MRN: [id number]  [place] 1965  Date of evaluation: 23    Vlad Barrientos       Chief Complaint   Patient presents with    Fatigue     X1 week     Hypertension     Pt states she has blurry vision with HA    Dizziness     No LOC, noticed Thursday     Shortness of Breath     Heaviness in chest & hard to deep breath         HISTORY OF PRESENT ILLNESS (Location/Symptom, Timing/Onset, Context/Setting, Quality, Duration, Modifying Factors, Severity)      Ilir Rosado is a 61 y.o. female with no pertinent PMH who presents to the ED via private auto with complaints of fatigue, hypertension, dizziness, and shortness of breath. Patient states that she has been dealing with vertigo as well. She states she has a history of vertigo. She states she has not been taking her Antivert. She states he also has had history of epilepsy, she states she is had a couple auras over the last few days, but has not had any actual seizure activity. She denies any fever chills or body aches. Denies any cough or nasal congestion. She states that she does get occasional shortness of breath, and the heaviness feeling in her chest.  She states is hard to take a deep breath at times, but not painful to do so. She does have a history of DVT years ago, is not on any anticoagulants currently. She denies any calf pain or swelling. PAST MEDICAL / SURGICAL / SOCIAL / FAMILY HISTORY     PMH:  has a past medical history of Migraines, Seizures ([address] and Vertigo. Surgical History:  has a past surgical history that includes shoulder surgery (Left, ); Mandible fracture surgery;  section; Hysterectomy, total abdominal; Total knee arthroplasty (Right, 2019); Total knee arthroplasty (Left, 2017); Cholecystectomy, laparoscopic ();  Hip Arthroplasty (Right, 02/14/2022); and hip surgery (Right, 2/14/2022). Social History:  reports that she has never smoked. She has never used smokeless tobacco. She reports current alcohol use. She reports that she does not use drugs. Family History: She indicated that the status of her mother is unknown. She indicated that the status of her brother is unknown. She indicated that the status of her maternal grandmother is unknown.   family history includes Cancer in her brother, maternal grandmother, and mother; Heart Disease in her mother. Psychiatric History: None    Allergies: Carbamazepine, Metoclopramide, Phenytoin sodium extended, Topiramate, Verapamil, Sulfamethoxazole-trimethoprim, and Amoxicillin    Home Medications:   Prior to Admission medications    Medication Sig Start Date End Date Taking? Authorizing Provider   PHENobarbital (LUMINAL) 64.8 MG tablet Take 1 tablet by mouth 2 times daily for 90 days. 12/14/22 3/14/23  Lesli Baum MD   SUMAtriptan (IMITREX) 100 MG tablet Take 1 tablet by mouth daily as needed for Migraine TAKE 2 TABLETS BY MOUTH DAILY AS NEEDED FOR HEADACHE 12/14/22   Lesli Baum MD   ALPRAZolam Trenia Fury) 1 MG tablet Take 1 tablet by mouth nightly as needed for Sleep for up to 90 days. 12/14/22 3/14/23  Lesli Baum MD   ondansetron (ZOFRAN-ODT) 4 MG disintegrating tablet Take 1 tablet by mouth 3 times daily as needed for Nausea or Vomiting 9/1/22   Lesli Baum MD   ibuprofen (ADVIL;MOTRIN) 800 MG tablet TK 1 T PO TID PRN 7/13/21   Lesli Baum MD       REVIEW OF SYSTEMS  (2-9 systems for level 4, 10 ormore for level 5)      Review of Systems   Constitutional:  Positive for fatigue. Negative for activity change, appetite change, chills, diaphoresis, fever and unexpected weight change. HENT: Negative. Eyes: Negative. Respiratory:  Positive for chest tightness and shortness of breath. Negative for apnea, cough, choking, wheezing and stridor. Cardiovascular:  Positive for chest pain. Negative for palpitations and leg swelling. Gastrointestinal:  Positive for diarrhea. Negative for abdominal distention, abdominal pain, anal bleeding, blood in stool, constipation, nausea, rectal pain and vomiting. Genitourinary: Negative. Musculoskeletal: Negative. Skin: Negative. Neurological: Negative. Hematological: Negative. Psychiatric/Behavioral: Negative. All other systems negative except as marked. PHYSICAL EXAM  (up to 7 for level 4, 8 or more for level 5)      INITIAL VITALS:  oral temperature is 98.2 °F (36.8 °C). Her blood pressure is 136/100 (abnormal) and her pulse is 74. Her respiration is 10 and oxygen saturation is 99%. Vital signs reviewed. Physical Exam  Constitutional:       Appearance: She is well-developed. HENT:      Head: Normocephalic. Mouth/Throat:      Mouth: Mucous membranes are moist.      Pharynx: Oropharynx is clear. Eyes:      Extraocular Movements: Extraocular movements intact. Pupils: Pupils are equal, round, and reactive to light. Cardiovascular:      Rate and Rhythm: Normal rate and regular rhythm. Pulmonary:      Effort: Pulmonary effort is normal.      Breath sounds: No decreased breath sounds, wheezing, rhonchi or rales. Chest:      Chest wall: No mass, deformity, tenderness or edema. Abdominal:      General: Bowel sounds are normal.      Palpations: Abdomen is soft. Musculoskeletal:         General: Normal range of motion. Cervical back: Normal range of motion. Right lower leg: No tenderness. No edema. Left lower leg: No tenderness. No edema. Skin:     General: Skin is warm and dry. Capillary Refill: Capillary refill takes less than 2 seconds. Neurological:      Mental Status: She is alert and oriented to person, place, and time.    Psychiatric:         Mood and Affect: Mood normal.         Behavior: Behavior normal.         DIFFERENTIAL DIAGNOSIS / MDM     On exam, patient is resting in her room with her only mother at bedside. She appears nontoxic and no distress is noted. Heart sounds are within normal limits upon auscultation. Lung sounds are clear and equal bilaterally. Bowel sounds are present in all 4 quadrants. No abdominal distention tenderness or guarding is noted. Patient does report bit of a headache currently. She does have a history of migraine headaches and this is currently feeling her typical migraine headache. She states she normally takes Imitrex at home for this, but has not taken any. She states she also been experiencing some vertigo recently, she does have a history of vertigo. She has not taken any of her Antivert for her symptoms at this time. Order some baseline lab work as well as an EKG and a fluid bolus and reassess. Slightly low potassium 3.5, otherwise CMP is grossly unremarkable. Troponin is negative. TSH of 1.76. CBC is grossly unremarkable. D-dimer 0.45. Patient is negative for influenza. COVID-19 is negative. UA is negative for urinary tract infection. Chest x-ray showing no acute abnormality per radiologist read. Discussed results with the patient. She states that she still is having a headache as well as some vertigo-like symptoms after the meclizine. I will add on some Tylenol as well as check a visual acuity. Head CT added as well. Patient is in agreement with this plan of care. 2130 - Dr. Marcie Colorado will be following up with these results.     PLAN (LABS / IMAGING / EKG):  Orders Placed This Encounter   Procedures    COVID-19, Rapid    Rapid Influenza A/B Antigens    XR CHEST PORTABLE    CT HEAD WO CONTRAST    CBC with Auto Differential    Comprehensive Metabolic Panel w/ Reflex to MG    Troponin    D-Dimer, Quantitative    Urinalysis with Reflex to Culture    Magnesium    Microscopic Urinalysis    TSH with Reflex    Visual acuity screening    EKG 12 Lead    Insert peripheral IV MEDICATIONS ORDERED:  Orders Placed This Encounter   Medications    0.9 % sodium chloride bolus    meclizine (ANTIVERT) tablet 12.5 mg    acetaminophen (TYLENOL) tablet 1,000 mg       Controlled Substances Monitoring:     DIAGNOSTIC RESULTS     EKG: All EKG's are interpreted by the Emergency Department Physician who either signs or Co-signs this chart in the [address] a cardiologist.      RADIOLOGY: All images are read by the radiologist and their interpretations are reviewed. CT HEAD WO CONTRAST   Final Result   No acute intracranial abnormality. XR CHEST PORTABLE   Final Result   No acute abnormality. No results found. LABS:  Results for orders placed or performed during the hospital encounter of 01/21/23   COVID-19, Rapid    Specimen: Nasopharyngeal Swab   Result Value Ref Range    Specimen Description . NASOPHARYNGEAL SWAB     SARS-CoV-2, Rapid Not Detected Not Detected   Rapid Influenza A/B Antigens    Specimen: Nasopharyngeal   Result Value Ref Range    Flu A Antigen NEGATIVE NEGATIVE    Flu B Antigen NEGATIVE NEGATIVE   CBC with Auto Differential   Result Value Ref Range    WBC 4.4 3.5 - 11.0 k/uL    RBC 4.72 4.0 - 5.2 m/uL    Hemoglobin 14.1 12.0 - 16.0 g/dL    Hematocrit 41.2 36 - 46 %    MCV 87.3 80 - 100 fL    MCH 29.8 26 - 34 pg    MCHC 34.1 31 - 37 g/dL    RDW 13.9 12.5 - 15.4 %    Platelets 195 585 - 636 k/uL    MPV 7.4 6.0 - 12.0 fL    Seg Neutrophils 50 36 - 66 %    Lymphocytes 36 24 - 44 %    Monocytes 8 2 - 11 %    Eosinophils % 5 (H) 1 - 4 %    Basophils 1 0 - 2 %    Segs Absolute 2.20 1.8 - 7.7 k/uL    Absolute Lymph # 1.60 1.0 - 4.8 k/uL    Absolute Mono # 0.40 0.1 - 1.2 k/uL    Absolute Eos # 0.20 0.0 - 0.4 k/uL    Basophils Absolute 0.00 0.0 - 0.2 k/uL   Comprehensive Metabolic Panel w/ Reflex to MG   Result Value Ref Range    Glucose 93 70 - 99 mg/dL    BUN 7 6 - 20 mg/dL    Creatinine 0.42 (L) 0.50 - 0.90 mg/dL    Est, Glom Filt Rate >60 >60 mL/min/1.73m2 Calcium 9.1 8.6 - 10.4 mg/dL    Sodium 136 135 - 144 mmol/L    Potassium 3.5 (L) 3.7 - 5.3 mmol/L    Chloride 101 98 - 107 mmol/L    CO2 25 20 - 31 mmol/L    Anion Gap 10 9 - 17 mmol/L    Alkaline Phosphatase 83 35 - 104 U/L    ALT 17 5 - 33 U/L    AST 19 <32 U/L    Total Bilirubin 0.3 0.3 - 1.2 mg/dL    Total Protein 6.3 (L) 6.4 - 8.3 g/dL    Albumin 3.9 3.5 - 5.2 g/dL    Albumin/Globulin Ratio 1.6 1.0 - 2.5   Troponin   Result Value Ref Range    Troponin, High Sensitivity <6 0 - 14 ng/L   D-Dimer, Quantitative   Result Value Ref Range    D-Dimer, Quant 0.45 mg/L FEU   Urinalysis with Reflex to Culture    Specimen: Urine, clean catch   Result Value Ref Range    Color, UA Yellow Yellow    Turbidity UA Clear Clear    Glucose, Ur NEGATIVE NEGATIVE    Bilirubin Urine NEGATIVE NEGATIVE    Ketones, Urine NEGATIVE NEGATIVE    Specific Gravity, UA 1.015 1.005 - 1.030    Urine Hgb NEGATIVE NEGATIVE    pH, UA 6.0 5.0 - 8.0    Protein, UA NEGATIVE NEGATIVE    Urobilinogen, Urine Normal Normal    Nitrite, Urine NEGATIVE NEGATIVE    Leukocyte Esterase, Urine TRACE (A) NEGATIVE   Magnesium   Result Value Ref Range    Magnesium 1.9 1.6 - 2.6 mg/dL   Microscopic Urinalysis   Result Value Ref Range    WBC, UA 0 TO 2 0 - 5 /HPF    RBC, UA None 0 - 2 /HPF    Epithelial Cells UA 0 TO 2 0 - 5 /HPF    Bacteria, UA FEW (A) None    Other Observations UA (A) NOT REQ. Utilizing a urinalysis as the only screening method to exclude a potential uropathogen can be unreliable in many patient populations. Rapid screening tests are less sensitive than culture and if UTI is a clinical possibility, culture should be considered despite a negative urinalysis.      TSH with Reflex   Result Value Ref Range    TSH 1.76 0.30 - 5.00 uIU/mL   EKG 12 Lead   Result Value Ref Range    Ventricular Rate 71 BPM    Atrial Rate 71 BPM    P-R Interval 106 ms    QRS Duration 76 ms    Q-T Interval 396 ms    QTc Calculation (Bazett) 430 ms    P Axis 41 degrees    R Axis 52 degrees    T Axis 61 degrees       EMERGENCY DEPARTMENT COURSE           Vitals:    Vitals:    01/21/23 1958 01/21/23 2030 01/21/23 2032 01/21/23 2213   BP: (!) 158/100 (!) 164/97 (!) 145/104 (!) 136/100   Pulse:  68  74   Resp:  10  10   Temp:       TempSrc:       SpO2:  100%  99%     -------------------------  BP: (!) 136/100, Temp: 98.2 °F (36.8 °C), Heart Rate: 74, Resp: 10      RE-EVALUATION:  See ED Course notes above. CONSULTS:  None    PROCEDURES:  None    FINAL IMPRESSION      1. Vertigo    2. Chronic fatigue          DISPOSITION / PLAN     CONDITION ON DISPOSITION:   Good / Stable for discharge. PATIENT REFERRED TO:  Kristine Jo MD  [phone] [facility]  [phone]    Schedule an appointment as soon as possible for a visit       [facility] Emergency Department  [address]   [address] [id number] [phone]  Go to   If symptoms worsen    Kyle Garcia MD    Schedule an appointment as soon as possible for a visit       DISCHARGE MEDICATIONS:  Discharge Medication List as of 1/21/2023 10:29 PM          ZUHAIR Gomez NP   Emergency Medicine Nurse Practitioner    (Please note that portions of this note were completed with a voice recognition program.  Efforts were made to edit the dictations but occasionally words aremis-transcribed.)      ZUHAIR Gomez NP  01/22/23 [id number]

## 2023-01-22 NOTE — ED PROVIDER NOTES
[facility] Emergency Department  [address] [facility] [address] [facility] [address]  Phone: [phone]  Fax: [phone]      Attending Physician Attestation    I performed a history and physical examination of the patient and discussed management with the mid level provideer. I reviewed the mid level provider's note and agree with the documented findings and plan of care. Any areas of disagreement are noted on the chart. I was personally present for the key portions of any procedures. I have documented in the chart those procedures where I was not present during the key portions. I have reviewed the emergency nurses triage note. I agree with the chief complaint, past medical history, past surgical history, allergies, medications, social and family history as documented unless otherwise noted below. Documentation of the HPI, Physical Exam and Medical Decision Making performed by mid level providers is based on my personal performance of the HPI, PE and MDM. For Physician Assistant/ Nurse Practitioner cases/documentation I have personally evaluated this patient and have completed at least one if not all key elements of the E/M (history, physical exam, and MDM). Additional findings are as noted. CHIEF COMPLAINT       Chief Complaint   Patient presents with    Fatigue     X1 week     Hypertension     Pt states she has blurry vision with HA    Dizziness     No LOC, noticed Thursday     Shortness of Breath     Heaviness in chest & hard to deep breath           HISTORY OF PRESENT ILLNESS    Erich Plata is a 61 y.o. female who presents to department with complaints of fatigue high blood pressure and dizziness she also has some chest pressure. States that she has been dealing with some chronic vertigo as well. Patient has not taken her Antivert for the last couple of days. Has a seizure history but has not had any seizures recently. No fevers chills or exposure.       PAST MEDICAL HISTORY    has a past medical history of Migraines, Seizures ([facility] [address] and Vertigo. SURGICAL HISTORY      has a past surgical history that includes shoulder surgery (Left, ); Mandible fracture surgery;  section; Hysterectomy, total abdominal; Total knee arthroplasty (Right, 2019); Total knee arthroplasty (Left, 2017); Cholecystectomy, laparoscopic (); Hip Arthroplasty (Right, 2022); and hip surgery (Right, 2022). CURRENT MEDICATIONS       Previous Medications    ALPRAZOLAM (XANAX) 1 MG TABLET    Take 1 tablet by mouth nightly as needed for Sleep for up to 90 days. IBUPROFEN (ADVIL;MOTRIN) 800 MG TABLET    TK 1 T PO TID PRN    ONDANSETRON (ZOFRAN-ODT) 4 MG DISINTEGRATING TABLET    Take 1 tablet by mouth 3 times daily as needed for Nausea or Vomiting    PHENOBARBITAL (LUMINAL) 64.8 MG TABLET    Take 1 tablet by mouth 2 times daily for 90 days. SUMATRIPTAN (IMITREX) 100 MG TABLET    Take 1 tablet by mouth daily as needed for Migraine TAKE 2 TABLETS BY MOUTH DAILY AS NEEDED FOR HEADACHE       ALLERGIES     is allergic to carbamazepine, metoclopramide, phenytoin sodium extended, topiramate, verapamil, sulfamethoxazole-trimethoprim, and amoxicillin. FAMILY HISTORY     She indicated that the status of her mother is unknown. She indicated that the status of her brother is unknown. She indicated that the status of her maternal grandmother is unknown.     family history includes Cancer in her brother, maternal grandmother, and mother; Heart Disease in her mother. SOCIAL HISTORY      reports that she has never smoked. She has never used smokeless tobacco. She reports current alcohol use. She reports that she does not use drugs. PHYSICAL EXAM     INITIAL VITALS:  oral temperature is 98.2 °F (36.8 °C). Her blood pressure is 158/100 (abnormal) and her pulse is 86. Her respiration is 12 and oxygen saturation is 98%.       Constitutional: Alert, oriented x3, nontoxic, afebrile, answering questions appropriately, acting properly for age, in no acute distress  HEENT: Extraocular muscles intact, mucus membranes moist, TMs clear bilaterally, no posterior pharyngeal erythema or exudates, Pupils equal, round, reactive to light,   Neck: Trachea midline, Supple without lymphadenopathy, no posterior midline neck tenderness to palpation  Cardiovascular: Regular rhythm and rate no S3, S4, or murmurs  Respiratory: Clear to auscultation bilaterally no wheezes, rhonchi, rales, no respiratory distress  Gastrointestinal: Soft, nontender, nondistended, positive bowel sounds. No rebound, rigidity, or guarding.   Musculoskeletal: No extremity pain or swelling  Neurologic: Moving all 4 extremities without difficulty there are no gross focal neurologic deficits  Skin: Warm and dry        DIAGNOSTIC RESULTS     EKG: All EKG's are interpreted by the Emergency Department Physician who either signs or Co-signs this chart in the absence of a cardiologist.          Differential diagnosis considered: Subarachnoid COVID, influenza, pneumonia    Chronic Conditions affecting care (DM,HTN,CA, etc): Migraines, seizures, vertigo    Social Determinants of Health affecting care (unable to care for self, lives alone, unemployed, homeless,etc): Not applicable    History source(s) (patient,spouse,parent,family,friend,EMS,etc): Patient and family    Review of external sources (ECF,Hospital records,EMS report, radiology reports, etc): Hospital    Tests considered but not ordered: not applicaple    Independent interpretation of tests (eg.  X-ray, CAT scan, Doppler studies, EKG): see below    Discussion of x-ray results with radiology: see below    Consults: see below    Consideration for admission/observation (even if discharged): admission and or observation considered based on test results and patient status    Prescription considerations: see below    Sepsis considered: Considered, sepsis not considered    Critical Care note written: see below      RADIOLOGY:   Non-plain film images such as CT, Ultrasound and MRI are read by the radiologist. Plain radiographic images are visualized and the radiologist interpretations are reviewed as follows:         LABS:  Results for orders placed or performed during the hospital encounter of 01/21/23   CBC with Auto Differential   Result Value Ref Range    WBC 4.4 3.5 - 11.0 k/uL    RBC 4.72 4.0 - 5.2 m/uL    Hemoglobin 14.1 12.0 - 16.0 g/dL    Hematocrit 41.2 36 - 46 %    MCV 87.3 80 - 100 fL    MCH 29.8 26 - 34 pg    MCHC 34.1 31 - 37 g/dL    RDW 13.9 12.5 - 15.4 %    Platelets 794 320 - 708 k/uL    MPV 7.4 6.0 - 12.0 fL    Seg Neutrophils 50 36 - 66 %    Lymphocytes 36 24 - 44 %    Monocytes 8 2 - 11 %    Eosinophils % 5 (H) 1 - 4 %    Basophils 1 0 - 2 %    Segs Absolute 2.20 1.8 - 7.7 k/uL    Absolute Lymph # 1.60 1.0 - 4.8 k/uL    Absolute Mono # 0.40 0.1 - 1.2 k/uL    Absolute Eos # 0.20 0.0 - 0.4 k/uL    Basophils Absolute 0.00 0.0 - 0.2 k/uL   Comprehensive Metabolic Panel w/ Reflex to MG   Result Value Ref Range    Glucose 93 70 - 99 mg/dL    BUN 7 6 - 20 mg/dL    Creatinine 0.42 (L) 0.50 - 0.90 mg/dL    Est, Glom Filt Rate >60 >60 mL/min/1.73m2    Calcium 9.1 8.6 - 10.4 mg/dL    Sodium 136 135 - 144 mmol/L    Potassium 3.5 (L) 3.7 - 5.3 mmol/L    Chloride 101 98 - 107 mmol/L    CO2 25 20 - 31 mmol/L    Anion Gap 10 9 - 17 mmol/L    Alkaline Phosphatase 83 35 - 104 U/L    ALT 17 5 - 33 U/L    AST 19 <32 U/L    Total Bilirubin 0.3 0.3 - 1.2 mg/dL    Total Protein 6.3 (L) 6.4 - 8.3 g/dL    Albumin 3.9 3.5 - 5.2 g/dL    Albumin/Globulin Ratio 1.6 1.0 - 2.5   Troponin   Result Value Ref Range    Troponin, High Sensitivity <6 0 - 14 ng/L   D-Dimer, Quantitative   Result Value Ref Range    D-Dimer, Quant 0.45 mg/L FEU   Urinalysis with Reflex to Culture    Specimen: Urine, clean catch   Result Value Ref Range    Color, UA Yellow Yellow    Turbidity UA Clear Clear    Glucose, Ur NEGATIVE NEGATIVE    Bilirubin Urine NEGATIVE NEGATIVE    Ketones, Urine NEGATIVE NEGATIVE    Specific Gravity, UA 1.015 1.005 - 1.030    Urine Hgb NEGATIVE NEGATIVE    pH, UA 6.0 5.0 - 8.0    Protein, UA NEGATIVE NEGATIVE    Urobilinogen, Urine Normal Normal    Nitrite, Urine NEGATIVE NEGATIVE    Leukocyte Esterase, Urine TRACE (A) NEGATIVE   Magnesium   Result Value Ref Range    Magnesium 1.9 1.6 - 2.6 mg/dL   Microscopic Urinalysis   Result Value Ref Range    WBC, UA 0 TO 2 0 - 5 /HPF    RBC, UA None 0 - 2 /HPF    Epithelial Cells UA 0 TO 2 0 - 5 /HPF    Bacteria, UA FEW (A) None    Other Observations UA (A) NOT REQ. Utilizing a urinalysis as the only screening method to exclude a potential uropathogen can be unreliable in many patient populations. Rapid screening tests are less sensitive than culture and if UTI is a clinical possibility, culture should be considered despite a negative urinalysis. EKG 12 Lead   Result Value Ref Range    Ventricular Rate 71 BPM    Atrial Rate 71 BPM    P-R Interval 106 ms    QRS Duration 76 ms    Q-T Interval 396 ms    QTc Calculation (Bazett) 430 ms    P Axis 41 degrees    R Axis 52 degrees    T Axis 61 degrees           EMERGENCY DEPARTMENT COURSE:   Vitals:    Vitals:    01/21/23 1858 01/21/23 1911 01/21/23 1958   BP: (!) 159/111  (!) 158/100   Pulse: 86     Resp: 12     Temp:  98.2 °F (36.8 °C)    TempSrc:  Oral    SpO2: 98%       -------------------------  BP: (!) 158/100, Temp: 98.2 °F (36.8 °C), Heart Rate: 86, Resp: 12      PERTINENT ATTENDING PHYSICIAN COMMENTS:    Patient will get a pertinent laboratory work-up also, CT her head she got an EKG which is unremarkable then she will be reassessed. To get a CT scan of her head which was negative for any acute disease we have also given her some Tylenol for headache and we feel like she is stable for discharge home to follow-up closely with her own doctors.   (Please note that portions of this note were completed with a voice recognition program.  Efforts were made to edit the dictations but occasionally words are mis-transcribed.)    Meagan Bernstein MD,, MD, F.A.C.E.P.   Attending Emergency Medicine Physician        Meagan Bernstein MD  01/21/23 9963

## 2023-01-23 LAB
EKG ATRIAL RATE: 71 BPM
EKG P AXIS: 41 DEGREES
EKG P-R INTERVAL: 106 MS
EKG Q-T INTERVAL: 396 MS
EKG QRS DURATION: 76 MS
EKG QTC CALCULATION (BAZETT): 430 MS
EKG R AXIS: 52 DEGREES
EKG T AXIS: 61 DEGREES
EKG VENTRICULAR RATE: 71 BPM

## 2023-02-06 ENCOUNTER — TELEPHONE (OUTPATIENT)
Dept: PRIMARY CARE CLINIC | Age: 58
End: 2023-02-06

## 2023-02-06 RX ORDER — LOSARTAN POTASSIUM 25 MG/1
25 TABLET ORAL DAILY
Qty: 30 TABLET | Refills: 0 | Status: SHIPPED | OUTPATIENT
Start: 2023-02-06

## 2023-02-06 NOTE — TELEPHONE ENCOUNTER
Was in ED a couple week ago for high BP. They thought she had a virus. Today BP is high again 171/111   170/107. She did go to ED but the wait was so long she left. Also had a seizure today. Please advise.

## 2023-02-07 ENCOUNTER — OFFICE VISIT (OUTPATIENT)
Dept: PRIMARY CARE CLINIC | Age: 58
End: 2023-02-07
Payer: COMMERCIAL

## 2023-02-07 VITALS
OXYGEN SATURATION: 98 % | WEIGHT: 111 LBS | BODY MASS INDEX: 20.43 KG/M2 | HEART RATE: 80 BPM | SYSTOLIC BLOOD PRESSURE: 140 MMHG | HEIGHT: 62 IN | DIASTOLIC BLOOD PRESSURE: 90 MMHG

## 2023-02-07 DIAGNOSIS — G40.909 SEIZURE DISORDER (HCC): ICD-10-CM

## 2023-02-07 DIAGNOSIS — I10 ESSENTIAL HYPERTENSION: Primary | ICD-10-CM

## 2023-02-07 PROCEDURE — 99213 OFFICE O/P EST LOW 20 MIN: CPT | Performed by: FAMILY MEDICINE

## 2023-02-07 PROCEDURE — G8427 DOCREV CUR MEDS BY ELIG CLIN: HCPCS | Performed by: FAMILY MEDICINE

## 2023-02-07 PROCEDURE — G8420 CALC BMI NORM PARAMETERS: HCPCS | Performed by: FAMILY MEDICINE

## 2023-02-07 PROCEDURE — 1036F TOBACCO NON-USER: CPT | Performed by: FAMILY MEDICINE

## 2023-02-07 PROCEDURE — 3017F COLORECTAL CA SCREEN DOC REV: CPT | Performed by: FAMILY MEDICINE

## 2023-02-07 PROCEDURE — 3077F SYST BP >= 140 MM HG: CPT | Performed by: FAMILY MEDICINE

## 2023-02-07 PROCEDURE — G8484 FLU IMMUNIZE NO ADMIN: HCPCS | Performed by: FAMILY MEDICINE

## 2023-02-07 PROCEDURE — 3080F DIAST BP >= 90 MM HG: CPT | Performed by: FAMILY MEDICINE

## 2023-02-07 RX ORDER — CYCLOBENZAPRINE HCL 5 MG
5 TABLET ORAL 3 TIMES DAILY PRN
COMMUNITY

## 2023-02-07 RX ORDER — HYDRALAZINE HYDROCHLORIDE 10 MG/1
10 TABLET, FILM COATED ORAL 4 TIMES DAILY PRN
Qty: 120 TABLET | Refills: 3 | Status: SHIPPED | OUTPATIENT
Start: 2023-02-07 | End: 2023-03-09

## 2023-02-07 SDOH — ECONOMIC STABILITY: FOOD INSECURITY: WITHIN THE PAST 12 MONTHS, THE FOOD YOU BOUGHT JUST DIDN'T LAST AND YOU DIDN'T HAVE MONEY TO GET MORE.: NEVER TRUE

## 2023-02-07 SDOH — ECONOMIC STABILITY: HOUSING INSECURITY
IN THE LAST 12 MONTHS, WAS THERE A TIME WHEN YOU DID NOT HAVE A STEADY PLACE TO SLEEP OR SLEPT IN A SHELTER (INCLUDING NOW)?: NO

## 2023-02-07 SDOH — ECONOMIC STABILITY: INCOME INSECURITY: HOW HARD IS IT FOR YOU TO PAY FOR THE VERY BASICS LIKE FOOD, HOUSING, MEDICAL CARE, AND HEATING?: NOT HARD AT ALL

## 2023-02-07 SDOH — ECONOMIC STABILITY: FOOD INSECURITY: WITHIN THE PAST 12 MONTHS, YOU WORRIED THAT YOUR FOOD WOULD RUN OUT BEFORE YOU GOT MONEY TO BUY MORE.: NEVER TRUE

## 2023-02-07 ASSESSMENT — ENCOUNTER SYMPTOMS
ABDOMINAL PAIN: 0
EYE REDNESS: 0
NAUSEA: 0
SHORTNESS OF BREATH: 0
SORE THROAT: 0
RHINORRHEA: 0
DIARRHEA: 0
COUGH: 0
VOMITING: 0
WHEEZING: 0
EYE DISCHARGE: 0

## 2023-02-07 ASSESSMENT — PATIENT HEALTH QUESTIONNAIRE - PHQ9
SUM OF ALL RESPONSES TO PHQ QUESTIONS 1-9: 0
SUM OF ALL RESPONSES TO PHQ QUESTIONS 1-9: 0
2. FEELING DOWN, DEPRESSED OR HOPELESS: 0
1. LITTLE INTEREST OR PLEASURE IN DOING THINGS: 0
SUM OF ALL RESPONSES TO PHQ QUESTIONS 1-9: 0
SUM OF ALL RESPONSES TO PHQ9 QUESTIONS 1 & 2: 0
SUM OF ALL RESPONSES TO PHQ QUESTIONS 1-9: 0

## 2023-02-07 NOTE — PROGRESS NOTES
[facility]  [address]  [address] [address]  [address]pt: [address] is a 61 y.o. female Established patient, who presents today for her medical conditions/complaints as noted below. Chief Complaint   Patient presents with    Hypertension    Fatigue    Seizures       HPI:     HPI  Pt states blood pressure was 179 yesterday. Pt states has a history of seizures. Last one occurred possible yesterday. No phenobarb level was checked in the hospital.  Patient describes the seizures more as staring off and having severe shaking. Has no loss of consciousness. Patient was not impressed with her last neurology encounter. States has never felt this poorly before  Last phenobarb level was noted to be subtherapeutic. Patient states blood pressures have often been running in the 663 or systolic above. Denies any headaches or chest pain. Patient has been feeling fatigued. States she is not feeling right. MRI brain was reviewed.     Reviewed prior notes Neurology  Reviewed previous Labs and Imaging    LDL Cholesterol (mg/dL)   Date Value   2022 211 (H)   2019 162 (H)       (goal LDL is <100)   AST (U/L)   Date Value   2023 19     ALT (U/L)   Date Value   2023 17     BUN (mg/dL)   Date Value   2023 7     TSH (uIU/mL)   Date Value   2023 1.76     BP Readings from Last 3 Encounters:   23 (!) 140/90   23 (!) 136/100   22 132/78          (goal 120/80)    Past Medical History:   Diagnosis Date    Migraines     Seizures ([address]     Vertigo       Past Surgical History:   Procedure Laterality Date     SECTION      CHOLECYSTECTOMY, LAPAROSCOPIC      HIP ARTHROPLASTY Right 2022    TOTAL HIP ARTHROPLASTY  BEACH & NEPHEW (Right )    HIP SURGERY Right 2022    TOTAL HIP ARTHROPLASTY  BEACH & NEPHEW performed by Peng Bianchi DO at [facility] (CERVIX REMOVED)      MANDIBLE FRACTURE SURGERY      SHOULDER SURGERY Left 1987    TOTAL KNEE ARTHROPLASTY Right 12/12/2019    TOTAL KNEE ARTHROPLASTY Left 07/13/2017       Family History   Problem Relation Age of Onset    Cancer Mother     Heart Disease Mother     Cancer Brother     Cancer Maternal Grandmother        Social History     Tobacco Use    Smoking status: Never    Smokeless tobacco: Never   Substance Use Topics    Alcohol use: Yes     Comment: Socially      Current Outpatient Medications   Medication Sig Dispense Refill    cyclobenzaprine (FLEXERIL) 5 MG tablet Take 5 mg by mouth 3 times daily as needed for Muscle spasms      hydrALAZINE (APRESOLINE) 10 MG tablet Take 1 tablet by mouth 4 times daily as needed (systolic greater than 013) 120 tablet 3    losartan (COZAAR) 25 MG tablet Take 1 tablet by mouth daily 30 tablet 0    PHENobarbital (LUMINAL) 64.8 MG tablet Take 1 tablet by mouth 2 times daily for 90 days. 180 tablet 1    SUMAtriptan (IMITREX) 100 MG tablet Take 1 tablet by mouth daily as needed for Migraine TAKE 2 TABLETS BY MOUTH DAILY AS NEEDED FOR HEADACHE 9 tablet 5    ALPRAZolam (XANAX) 1 MG tablet Take 1 tablet by mouth nightly as needed for Sleep for up to 90 days. 90 tablet 1    ondansetron (ZOFRAN-ODT) 4 MG disintegrating tablet Take 1 tablet by mouth 3 times daily as needed for Nausea or Vomiting 21 tablet 0    ibuprofen (ADVIL;MOTRIN) 800 MG tablet TK 1 T PO TID  tablet 2     No current facility-administered medications for this visit.      Allergies   Allergen Reactions    Carbamazepine Nausea Only and Nausea And Vomiting    Metoclopramide Other (See Comments)     Seizures      Phenytoin Sodium Extended Other (See Comments)     \"Causes seizure\"    Topiramate Nausea Only and Nausea And Vomiting     unknown    Verapamil Nausea Only and Nausea And Vomiting    Sulfamethoxazole-Trimethoprim Hives    Amoxicillin        Health Maintenance   Topic Date Due    DTaP/Tdap/Td vaccine (2 - Td or Tdap) 09/13/2012    Shingles vaccine (1 of 2) Never done    Breast cancer screen  03/12/2022    COVID-19 Vaccine (3 - Booster for Pfizer series) 03/20/2022    Flu vaccine (1) 08/01/2022    Depression Screen  09/01/2023    Lipids  12/14/2027    Colorectal Cancer Screen  09/18/2029    Hepatitis C screen  Completed    Hepatitis A vaccine  Aged Out    Hib vaccine  Aged Out    Meningococcal (ACWY) vaccine  Aged Out    Pneumococcal 0-64 years Vaccine  Aged Out    HIV screen  Discontinued       Subjective:      Review of Systems   Constitutional:  Positive for fatigue. Negative for chills and fever. HENT:  Negative for rhinorrhea and sore throat. Eyes:  Negative for discharge and redness. Respiratory:  Negative for cough, shortness of breath and wheezing. Cardiovascular:  Negative for chest pain and palpitations. Gastrointestinal:  Negative for abdominal pain, diarrhea, nausea and vomiting. Genitourinary:  Negative for dysuria and frequency. Musculoskeletal:  Negative for arthralgias and myalgias. Neurological:  Positive for seizures. Negative for dizziness, light-headedness and headaches. Psychiatric/Behavioral:  Negative for sleep disturbance. Objective:     BP (!) 140/90   Pulse 80   Ht 5' 2.4\" (1.585 m)   Wt 111 lb (50.3 kg)   SpO2 98%   BMI 20.04 kg/m²   Physical Exam  Vitals and nursing note reviewed. Constitutional:       General: She is not in acute distress. Appearance: She is well-developed. She is not ill-appearing. HENT:      Head: Normocephalic and atraumatic. Right Ear: External ear normal.      Left Ear: External ear normal.   Eyes:      General: No scleral icterus. Right eye: No discharge. Left eye: No discharge. Conjunctiva/sclera: Conjunctivae normal.   Neck:      Thyroid: No thyromegaly. Trachea: No tracheal deviation. Cardiovascular:      Rate and Rhythm: Normal rate and regular rhythm. Heart sounds: Normal heart sounds. Pulmonary:      Effort: Pulmonary effort is normal. No respiratory distress. Breath sounds: Normal breath sounds. No wheezing. Lymphadenopathy:      Cervical: No cervical adenopathy. Skin:     General: Skin is warm. Findings: No rash. Neurological:      Mental Status: She is alert and oriented to person, place, and time. Psychiatric:         Mood and Affect: Mood normal.         Behavior: Behavior normal.         Thought Content: Thought content normal.       Assessment:       Diagnosis Orders   1. Essential hypertension        2. Seizure disorder (HCC)  Phenobarbital Level           Plan:   Check phenobarb level. If low, would adjust medication  Continue losartan 25 mg daily for blood pressure. Patient just started yesterday. Patient given prescription for Apresoline 10 mg 4 times daily as needed systolic greater than 146  Recheck in 4 weeks    Return in about 4 weeks (around 3/7/2023). Orders Placed This Encounter   Procedures    Phenobarbital Level     Standing Status:   Future     Standing Expiration Date:   2/7/2024     Order Specific Question:   Dose Schedule & Time of Last Dose? Answer:   ask patient     Orders Placed This Encounter   Medications    hydrALAZINE (APRESOLINE) 10 MG tablet     Sig: Take 1 tablet by mouth 4 times daily as needed (systolic greater than 700)     Dispense:  120 tablet     Refill:  3       Patient given educational materials - see patient instructions. Discussed use, benefit, and side effects of prescribed medications. All patient questions answered. Pt voiced understanding. Reviewed health maintenance. Instructed to continue current medications, diet andexercise. Patient agreed with treatment plan. Follow up as directed.      Electronicallysigned by Faye Keene MD on 2/7/2023 at 2:47 PM

## 2023-02-08 LAB
PHENOBARBITAL DATE LAST DOSE: ABNORMAL
PHENOBARBITAL DOSE AMOUNT: ABNORMAL
PHENOBARBITAL TIME LAST DOSE: ABNORMAL
PHENOBARBITAL: 13.2 UG/ML (ref 15–40)

## 2023-02-10 DIAGNOSIS — G43.909 MIGRAINE WITHOUT STATUS MIGRAINOSUS, NOT INTRACTABLE, UNSPECIFIED MIGRAINE TYPE: ICD-10-CM

## 2023-02-10 RX ORDER — PHENOBARBITAL 64.8 MG/1
64.8 TABLET ORAL 3 TIMES DAILY
Qty: 270 TABLET | Refills: 1 | Status: SHIPPED | OUTPATIENT
Start: 2023-02-10 | End: 2023-05-11

## 2023-02-10 NOTE — RESULT ENCOUNTER NOTE
Advise patient phenobarb level is low. Increase her phenobarb to 3 times a day.   New prescription sent to pharmacy

## 2023-03-01 ENCOUNTER — OFFICE VISIT (OUTPATIENT)
Dept: PRIMARY CARE CLINIC | Age: 58
End: 2023-03-01
Payer: COMMERCIAL

## 2023-03-01 VITALS
SYSTOLIC BLOOD PRESSURE: 136 MMHG | DIASTOLIC BLOOD PRESSURE: 88 MMHG | WEIGHT: 115 LBS | HEIGHT: 62 IN | BODY MASS INDEX: 21.16 KG/M2 | OXYGEN SATURATION: 98 % | HEART RATE: 79 BPM

## 2023-03-01 DIAGNOSIS — R11.0 NAUSEA: ICD-10-CM

## 2023-03-01 DIAGNOSIS — G40.909 SEIZURE DISORDER (HCC): Primary | ICD-10-CM

## 2023-03-01 DIAGNOSIS — I10 ESSENTIAL HYPERTENSION: ICD-10-CM

## 2023-03-01 PROCEDURE — G8427 DOCREV CUR MEDS BY ELIG CLIN: HCPCS | Performed by: FAMILY MEDICINE

## 2023-03-01 PROCEDURE — G8420 CALC BMI NORM PARAMETERS: HCPCS | Performed by: FAMILY MEDICINE

## 2023-03-01 PROCEDURE — 99213 OFFICE O/P EST LOW 20 MIN: CPT | Performed by: FAMILY MEDICINE

## 2023-03-01 PROCEDURE — 3075F SYST BP GE 130 - 139MM HG: CPT | Performed by: FAMILY MEDICINE

## 2023-03-01 PROCEDURE — G8484 FLU IMMUNIZE NO ADMIN: HCPCS | Performed by: FAMILY MEDICINE

## 2023-03-01 PROCEDURE — 96372 THER/PROPH/DIAG INJ SC/IM: CPT | Performed by: FAMILY MEDICINE

## 2023-03-01 PROCEDURE — 3079F DIAST BP 80-89 MM HG: CPT | Performed by: FAMILY MEDICINE

## 2023-03-01 PROCEDURE — 1036F TOBACCO NON-USER: CPT | Performed by: FAMILY MEDICINE

## 2023-03-01 PROCEDURE — 3017F COLORECTAL CA SCREEN DOC REV: CPT | Performed by: FAMILY MEDICINE

## 2023-03-01 RX ORDER — PROMETHAZINE HYDROCHLORIDE 25 MG/ML
6.25 INJECTION, SOLUTION INTRAMUSCULAR; INTRAVENOUS ONCE
Status: COMPLETED | OUTPATIENT
Start: 2023-03-01 | End: 2023-03-01

## 2023-03-01 RX ORDER — LOSARTAN POTASSIUM 50 MG/1
50 TABLET ORAL DAILY
Qty: 90 TABLET | Refills: 3 | Status: SHIPPED | OUTPATIENT
Start: 2023-03-01

## 2023-03-01 RX ADMIN — PROMETHAZINE HYDROCHLORIDE 6.25 MG: 25 INJECTION, SOLUTION INTRAMUSCULAR; INTRAVENOUS at 17:11

## 2023-03-01 ASSESSMENT — ENCOUNTER SYMPTOMS
SORE THROAT: 0
VOMITING: 0
RHINORRHEA: 0
ABDOMINAL PAIN: 0
COUGH: 0
NAUSEA: 0
EYE DISCHARGE: 0
SHORTNESS OF BREATH: 0
WHEEZING: 0
DIARRHEA: 0
EYE REDNESS: 0

## 2023-03-01 NOTE — PROGRESS NOTES
[facility]  [address]  [address] [address]  Dept: [address] is a 61 y.o. female Established patient, who presents today for her medical conditions/complaints as noted below. Chief Complaint   Patient presents with    Hypertension    Seizures       HPI:     HPI  Patient with seizure-like activity in the exam room. Patient was noted to be rocking back and forth and not responding. Had no loss of bowel or bladder. Patient having headaches afterwards. Patient was somewhat obtunded. Patient continues to complain of nausea. Patient states blood pressures have been variable at home. Running anywhere is from the 180s down to 130s. Denies any chest pain or shortness of breath. Last EEG in December was normal.  Patient currently seeing a counselor for her depression. Does not want to be on any medications. Feels that doing the counseling alone has been beneficial.  Patient continues to have her migraine headaches. Patient did increase her phenobarb to 3 a day. However still having seizure-like activity.     Reviewed prior notes None  Reviewed previous Labs    LDL Cholesterol (mg/dL)   Date Value   2022 211 (H)   2019 162 (H)       (goal LDL is <100)   AST (U/L)   Date Value   2023 19     ALT (U/L)   Date Value   2023 17     BUN (mg/dL)   Date Value   2023 7     TSH (uIU/mL)   Date Value   2023 1.76     BP Readings from Last 3 Encounters:   23 136/88   23 (!) 140/90   23 (!) 136/100          (goal 120/80)    Past Medical History:   Diagnosis Date    Migraines     Seizures ([address]     Vertigo       Past Surgical History:   Procedure Laterality Date     SECTION      CHOLECYSTECTOMY, LAPAROSCOPIC      HIP ARTHROPLASTY Right 2022    TOTAL HIP ARTHROPLASTY  BEACH & NEPHEW (Right )    HIP SURGERY Right 2022    TOTAL HIP ARTHROPLASTY  BEACH & NEPHEW performed by David Varela DO at [address] [place] [address], TOTAL ABDOMINAL (CERVIX REMOVED)      MANDIBLE FRACTURE SURGERY      SHOULDER SURGERY Left 1987    TOTAL KNEE ARTHROPLASTY Right 12/12/2019    TOTAL KNEE ARTHROPLASTY Left 07/13/2017       Family History   Problem Relation Age of Onset    Cancer Mother     Heart Disease Mother     Cancer Brother     Cancer Maternal Grandmother        Social History     Tobacco Use    Smoking status: Never    Smokeless tobacco: Never   Substance Use Topics    Alcohol use: Yes     Comment: Socially      Current Outpatient Medications   Medication Sig Dispense Refill    losartan (COZAAR) 50 MG tablet Take 1 tablet by mouth daily 90 tablet 3    PHENobarbital (LUMINAL) 64.8 MG tablet Take 1 tablet by mouth 3 times daily for 90 days. Max Daily Amount: 194.4 mg 270 tablet 1    cyclobenzaprine (FLEXERIL) 5 MG tablet Take 5 mg by mouth 3 times daily as needed for Muscle spasms      hydrALAZINE (APRESOLINE) 10 MG tablet Take 1 tablet by mouth 4 times daily as needed (systolic greater than 297) 120 tablet 3    SUMAtriptan (IMITREX) 100 MG tablet Take 1 tablet by mouth daily as needed for Migraine TAKE 2 TABLETS BY MOUTH DAILY AS NEEDED FOR HEADACHE 9 tablet 5    ALPRAZolam (XANAX) 1 MG tablet Take 1 tablet by mouth nightly as needed for Sleep for up to 90 days. 90 tablet 1    ondansetron (ZOFRAN-ODT) 4 MG disintegrating tablet Take 1 tablet by mouth 3 times daily as needed for Nausea or Vomiting 21 tablet 0    ibuprofen (ADVIL;MOTRIN) 800 MG tablet TK 1 T PO TID  tablet 2     No current facility-administered medications for this visit.      Allergies   Allergen Reactions    Carbamazepine Nausea Only and Nausea And Vomiting    Metoclopramide Other (See Comments)     Seizures      Phenytoin Sodium Extended Other (See Comments)     \"Causes seizure\"    Topiramate Nausea Only and Nausea And Vomiting     unknown    Verapamil Nausea Only and Nausea And Vomiting    Sulfamethoxazole-Trimethoprim Hives    Amoxicillin        Health Maintenance   Topic Date Due    DTaP/Tdap/Td vaccine (2 - Td or Tdap) 09/13/2012    Shingles vaccine (1 of 2) Never done    Breast cancer screen  03/12/2022    COVID-19 Vaccine (3 - Booster for Pfizer series) 03/20/2022    Flu vaccine (1) 08/01/2022    Depression Screen  02/07/2024    Lipids  12/14/2027    Colorectal Cancer Screen  09/18/2029    Hepatitis C screen  Completed    Hepatitis A vaccine  Aged Out    Hib vaccine  Aged Out    Meningococcal (ACWY) vaccine  Aged Out    Pneumococcal 0-64 years Vaccine  Aged Out    HIV screen  Discontinued       Subjective:      Review of Systems   Constitutional:  Negative for chills and fever. HENT:  Negative for rhinorrhea and sore throat. Eyes:  Negative for discharge and redness. Respiratory:  Negative for cough, shortness of breath and wheezing. Cardiovascular:  Negative for chest pain and palpitations. Gastrointestinal:  Negative for abdominal pain, diarrhea, nausea and vomiting. Genitourinary:  Negative for dysuria and frequency. Musculoskeletal:  Negative for arthralgias and myalgias. Neurological:  Positive for seizures. Negative for dizziness, light-headedness and headaches. Psychiatric/Behavioral:  Negative for sleep disturbance. Objective:     /88   Pulse 79   Ht 5' 2.4\" (1.585 m)   Wt 115 lb (52.2 kg)   SpO2 98%   BMI 20.76 kg/m²   Physical Exam  Vitals and nursing note reviewed. Constitutional:       General: She is not in acute distress. Appearance: She is well-developed. She is not ill-appearing. HENT:      Head: Normocephalic and atraumatic. Right Ear: External ear normal.      Left Ear: External ear normal.   Eyes:      General: No scleral icterus. Right eye: No discharge. Left eye: No discharge. Conjunctiva/sclera: Conjunctivae normal.   Neck:      Thyroid: No thyromegaly. Trachea: No tracheal deviation.    Cardiovascular:      Rate and Rhythm: Normal rate and regular rhythm. Heart sounds: Normal heart sounds. Pulmonary:      Effort: Pulmonary effort is normal. No respiratory distress. Breath sounds: Normal breath sounds. No wheezing. Lymphadenopathy:      Cervical: No cervical adenopathy. Skin:     General: Skin is warm. Findings: No rash. Neurological:      Mental Status: She is alert and oriented to person, place, and time. Psychiatric:         Mood and Affect: Mood normal.         Behavior: Behavior normal.         Thought Content: Thought content normal.       Assessment:       Diagnosis Orders   1. Seizure disorder ([address]  Phenobarbital Level    External Referral To Neurology      2. Essential hypertension             Plan:   Repeat phenobarb level. May need to adjust.  EEG was reviewed from December  Referral to [facility] neurology department for second opinion on seizure versus pseudoseizure  Increase losartan to 50 mg daily for hypertension    Return in about 6 weeks (around 4/12/2023). Orders Placed This Encounter   Procedures    Phenobarbital Level     Standing Status:   Future     Standing Expiration Date:   3/1/2024     Order Specific Question:   Dose Schedule & Time of Last Dose? Answer:   ask patient    External Referral To Neurology     Referral Priority:   Routine     Referral Type:   Eval and Treat     Referral Reason:   Specialty Services Required     Requested Specialty:   Neurology     Number of Visits Requested:   1     Orders Placed This Encounter   Medications    losartan (COZAAR) 50 MG tablet     Sig: Take 1 tablet by mouth daily     Dispense:  90 tablet     Refill:  3       Patient given educational materials - see patient instructions. Discussed use, benefit, and side effects of prescribed medications. All patient questions answered. Pt voiced understanding. Reviewed health maintenance. Instructed to continue current medications, diet andexercise. Patient agreed with treatment plan. Follow up as directed. Electronicallysigned by Tawny Mackay MD on 3/1/2023 at 4:36 PM

## 2023-03-23 ENCOUNTER — TELEPHONE (OUTPATIENT)
Dept: PRIMARY CARE CLINIC | Age: 58
End: 2023-03-23

## 2023-03-23 DIAGNOSIS — G43.009 MIGRAINE WITHOUT AURA AND WITHOUT STATUS MIGRAINOSUS, NOT INTRACTABLE: Primary | ICD-10-CM

## 2023-03-23 DIAGNOSIS — G40.909 SEIZURE DISORDER (HCC): ICD-10-CM

## 2023-03-23 NOTE — TELEPHONE ENCOUNTER
Pt called asking for a referral to [facility] neurology. Pt states she had a seizure x1 day ago. She states it happened so fast she didn't get any warning signs. She states she was on the floor for about x4 hours. She does not remember anything from it.      [id number] [address] for referral?

## 2023-04-18 ENCOUNTER — TELEPHONE (OUTPATIENT)
Dept: PRIMARY CARE CLINIC | Age: 58
End: 2023-04-18

## 2023-04-18 DIAGNOSIS — M15.9 PRIMARY OSTEOARTHRITIS INVOLVING MULTIPLE JOINTS: Primary | ICD-10-CM

## 2023-04-18 RX ORDER — METHOCARBAMOL 750 MG/1
750 TABLET, FILM COATED ORAL 3 TIMES DAILY
Qty: 90 TABLET | Refills: 0 | Status: SHIPPED | OUTPATIENT
Start: 2023-04-18 | End: 2023-04-28

## 2023-04-18 RX ORDER — GABAPENTIN 300 MG/1
600 CAPSULE ORAL 3 TIMES DAILY
Qty: 180 CAPSULE | Refills: 0 | Status: SHIPPED | OUTPATIENT
Start: 2023-04-18 | End: 2023-10-15

## 2023-04-18 NOTE — TELEPHONE ENCOUNTER
Asking for referral to Rheumatology Dr. Mary Angelucci referral pended. Motrin not helping. She is asking if you can fill Gabapentin 300mg 2 caps TID, and Robaxin 750mg TID for her until she can see the specialist. This is what they used when she had her hip surgery. Scripts pended. Approve or deny.      [facility]

## 2023-05-16 ENCOUNTER — TELEPHONE (OUTPATIENT)
Dept: PRIMARY CARE CLINIC | Age: 58
End: 2023-05-16

## 2023-05-23 ENCOUNTER — TELEPHONE (OUTPATIENT)
Dept: PRIMARY CARE CLINIC | Age: 58
End: 2023-05-23

## 2023-05-23 NOTE — TELEPHONE ENCOUNTER
[facility] - Rhuematology - saw Simon Big this morning as a new patient - Dr. Jaci Schulte would like to speak with you directly regarding patient. [phone] - Dr. Xavier Bernheim; please call after 5 today.

## 2023-05-26 DIAGNOSIS — G47.00 INSOMNIA, UNSPECIFIED TYPE: ICD-10-CM

## 2023-05-26 RX ORDER — ALPRAZOLAM 1 MG/1
1 TABLET ORAL NIGHTLY PRN
Qty: 90 TABLET | Refills: 1 | Status: SHIPPED | OUTPATIENT
Start: 2023-05-26 | End: 2023-08-24

## 2023-06-15 PROBLEM — M79.7 FIBROMYALGIA: Status: ACTIVE | Noted: 2023-06-15

## 2023-06-21 RX ORDER — GABAPENTIN 300 MG/1
600 CAPSULE ORAL 3 TIMES DAILY
Qty: 180 CAPSULE | Refills: 0 | Status: SHIPPED | OUTPATIENT
Start: 2023-06-21 | End: 2023-12-18

## 2023-06-21 NOTE — TELEPHONE ENCOUNTER
LAST VISIT:   6/15/2023     Future Appointments   Date Time Provider Mari Padron   6/29/2023  6:00 PM KATHY Blue   10/5/2023  9:20 AM MD SHAYY Marks

## 2023-06-23 RX ORDER — METHOCARBAMOL 750 MG/1
750 TABLET, FILM COATED ORAL 3 TIMES DAILY
Qty: 90 TABLET | Refills: 3 | Status: SHIPPED | OUTPATIENT
Start: 2023-06-23

## 2023-06-23 NOTE — TELEPHONE ENCOUNTER
LAST VISIT:   Visit date not found     Future Appointments   Date Time Provider Mari Nereida   6/29/2023  6:00 PM KATHY Blue   10/5/2023  9:20 AM MD SHAYY Wolff

## 2023-06-29 ENCOUNTER — HOSPITAL ENCOUNTER (OUTPATIENT)
Dept: MAMMOGRAPHY | Age: 58
Discharge: HOME | End: 2023-07-01
Attending: FAMILY MEDICINE
Payer: COMMERCIAL

## 2023-06-29 DIAGNOSIS — M81.0 OSTEOPOROSIS, UNSPECIFIED OSTEOPOROSIS TYPE, UNSPECIFIED PATHOLOGICAL FRACTURE PRESENCE: ICD-10-CM

## 2023-06-29 PROCEDURE — 77080 DXA BONE DENSITY AXIAL: CPT

## 2023-07-24 ENCOUNTER — TELEPHONE (OUTPATIENT)
Dept: PRIMARY CARE CLINIC | Age: 58
End: 2023-07-24

## 2023-07-24 DIAGNOSIS — R42 VERTIGO: ICD-10-CM

## 2023-07-24 RX ORDER — DULOXETIN HYDROCHLORIDE 60 MG/1
60 CAPSULE, DELAYED RELEASE ORAL DAILY
Qty: 30 CAPSULE | Refills: 5 | Status: SHIPPED | OUTPATIENT
Start: 2023-07-24

## 2023-07-24 RX ORDER — MECLIZINE HCL 12.5 MG/1
12.5 TABLET ORAL 3 TIMES DAILY PRN
Qty: 30 TABLET | Refills: 3 | Status: SHIPPED | OUTPATIENT
Start: 2023-07-24 | End: 2023-09-02

## 2023-07-24 NOTE — TELEPHONE ENCOUNTER
Pt asking if she can get a rx for Meclizine 12.5 mg? Was on it recently. States that she had a concussion and would like a refill. Uses [facility] [address] listed.

## 2023-07-24 NOTE — TELEPHONE ENCOUNTER
Pt asking if you can increase the Duloxetine dose. Works a little, but feels it needs increased. Uses [facility].

## 2023-07-25 RX ORDER — GABAPENTIN 300 MG/1
600 CAPSULE ORAL 3 TIMES DAILY
Qty: 180 CAPSULE | Refills: 0 | Status: SHIPPED | OUTPATIENT
Start: 2023-07-25 | End: 2024-01-21

## 2023-07-25 NOTE — TELEPHONE ENCOUNTER
LAST VISIT:   6/15/2023     Future Appointments   Date Time Provider [address]   10/5/2023  9:20 AM MD SHAYY [place] [facility]

## 2023-08-23 ENCOUNTER — TELEPHONE (OUTPATIENT)
Dept: PRIMARY CARE CLINIC | Age: 58
End: 2023-08-23

## 2023-08-23 NOTE — TELEPHONE ENCOUNTER
Pt asking if you would send in a rx for Acetaminophen  MG for her. States that it works better than Reg Tyl? The Motrin is starting to hurt her stomach. Uses [facility] listed.

## 2023-08-24 RX ORDER — ACETAMINOPHEN 500 MG
1000 TABLET ORAL 3 TIMES DAILY PRN
Qty: 180 TABLET | Refills: 5 | Status: SHIPPED | OUTPATIENT
Start: 2023-08-24

## 2023-09-19 NOTE — TELEPHONE ENCOUNTER
LAST VISIT:   6/15/2023     Future Appointments   Date Time Provider [address]   10/26/2023 11:00 AM MD SHAYY Marie

## 2023-09-20 RX ORDER — ONDANSETRON 4 MG/1
4 TABLET, ORALLY DISINTEGRATING ORAL 3 TIMES DAILY PRN
Qty: 21 TABLET | Refills: 0 | Status: SHIPPED | OUTPATIENT
Start: 2023-09-20

## 2023-09-20 RX ORDER — SUMATRIPTAN 100 MG/1
100 TABLET, FILM COATED ORAL DAILY PRN
Qty: 9 TABLET | Refills: 5 | Status: SHIPPED | OUTPATIENT
Start: 2023-09-20

## 2023-09-28 RX ORDER — GABAPENTIN 300 MG/1
600 CAPSULE ORAL 3 TIMES DAILY
Qty: 180 CAPSULE | Refills: 0 | Status: SHIPPED | OUTPATIENT
Start: 2023-09-28 | End: 2024-03-26

## 2023-09-28 NOTE — TELEPHONE ENCOUNTER
LAST VISIT:   6/15/2023     Future Appointments   Date Time Provider [address]   10/26/2023 11:00 AM MD SHAYY Dickinson in [place]

## 2023-11-06 ENCOUNTER — OFFICE VISIT (OUTPATIENT)
Dept: PRIMARY CARE CLINIC | Age: 58
End: 2023-11-06
Payer: COMMERCIAL

## 2023-11-06 VITALS
OXYGEN SATURATION: 97 % | SYSTOLIC BLOOD PRESSURE: 126 MMHG | DIASTOLIC BLOOD PRESSURE: 72 MMHG | WEIGHT: 120.4 LBS | HEIGHT: 62 IN | HEART RATE: 78 BPM | BODY MASS INDEX: 22.16 KG/M2

## 2023-11-06 DIAGNOSIS — Z12.31 ENCOUNTER FOR SCREENING MAMMOGRAM FOR MALIGNANT NEOPLASM OF BREAST: ICD-10-CM

## 2023-11-06 DIAGNOSIS — F41.1 GENERALIZED ANXIETY DISORDER: ICD-10-CM

## 2023-11-06 DIAGNOSIS — R07.9 CHEST PAIN, UNSPECIFIED TYPE: Primary | ICD-10-CM

## 2023-11-06 DIAGNOSIS — M79.7 FIBROMYALGIA: ICD-10-CM

## 2023-11-06 PROCEDURE — 99214 OFFICE O/P EST MOD 30 MIN: CPT | Performed by: FAMILY MEDICINE

## 2023-11-06 PROCEDURE — 3017F COLORECTAL CA SCREEN DOC REV: CPT | Performed by: FAMILY MEDICINE

## 2023-11-06 PROCEDURE — G8420 CALC BMI NORM PARAMETERS: HCPCS | Performed by: FAMILY MEDICINE

## 2023-11-06 PROCEDURE — G8427 DOCREV CUR MEDS BY ELIG CLIN: HCPCS | Performed by: FAMILY MEDICINE

## 2023-11-06 PROCEDURE — G8484 FLU IMMUNIZE NO ADMIN: HCPCS | Performed by: FAMILY MEDICINE

## 2023-11-06 PROCEDURE — 1036F TOBACCO NON-USER: CPT | Performed by: FAMILY MEDICINE

## 2023-11-06 RX ORDER — ALPRAZOLAM 1 MG/1
1 TABLET ORAL NIGHTLY PRN
COMMUNITY
Start: 2023-10-17

## 2023-11-06 RX ORDER — ESCITALOPRAM OXALATE 10 MG/1
10 TABLET ORAL DAILY
Qty: 30 TABLET | Refills: 11 | Status: SHIPPED | OUTPATIENT
Start: 2023-11-06

## 2023-11-06 RX ORDER — GABAPENTIN 300 MG/1
600 CAPSULE ORAL 3 TIMES DAILY
Qty: 180 CAPSULE | Refills: 0 | Status: SHIPPED | OUTPATIENT
Start: 2023-11-06 | End: 2024-05-04

## 2023-11-06 ASSESSMENT — ENCOUNTER SYMPTOMS
VOMITING: 0
DIARRHEA: 0
RHINORRHEA: 0
ABDOMINAL PAIN: 0
EYE DISCHARGE: 0
WHEEZING: 0
NAUSEA: 0
EYE REDNESS: 0
COUGH: 0
SORE THROAT: 0
SHORTNESS OF BREATH: 0

## 2023-11-06 NOTE — PROGRESS NOTES
regular rhythm. Heart sounds: Normal heart sounds. Pulmonary:      Effort: Pulmonary effort is normal. No respiratory distress. Breath sounds: Normal breath sounds. No wheezing. Lymphadenopathy:      Cervical: No cervical adenopathy. Skin:     General: Skin is warm. Findings: No rash. Neurological:      Mental Status: She is alert and oriented to person, place, and time. Psychiatric:         Mood and Affect: Mood normal.         Behavior: Behavior normal.         Thought Content: Thought content normal.         Assessment:       Diagnosis Orders   1. Chest pain, unspecified type  Nuclear stress test with myocardial perfusion      2. [address] [place] [address] DO Stanton, Pain Management, [place]      3. Generalized anxiety disorder  escitalopram (LEXAPRO) 10 MG tablet      4. Encounter for screening mammogram for malignant neoplasm of breast  [facility] DIGITAL SCREEN W OR WO CAD BILATERAL           Plan:    Cardiolite stress test ordered    Change Cymbalta to Lexapro 10 mg daily    Referral to pain management for second opinion on what can be done to help with her overall pain    Mammogram ordered    Return in about 6 months (around 5/6/2024). Orders Placed This Encounter   Procedures    [facility] DIGITAL SCREEN W OR WO CAD BILATERAL     Standing Status:   Future     Standing Expiration Date:   1/6/2025     Order Specific Question:   Reason for exam:     Answer:   screen    [place] - Narciso Meier DO, Pain Management, [place]     Referral Priority:   Routine     Referral Type:   Eval and Treat     Referral Reason:   Specialty Services Required     Referred to Provider:   Narciso Meier DO     Number of Visits Requested:   1     Orders Placed This Encounter   Medications    escitalopram (LEXAPRO) 10 MG tablet     Sig: Take 1 tablet by mouth daily     Dispense:  30 tablet     Refill:  11       Patient given educational materials - see patient instructions.   Discussed use, benefit, and

## 2023-11-24 DIAGNOSIS — F41.9 ANXIETY: Primary | ICD-10-CM

## 2023-11-24 RX ORDER — ALPRAZOLAM 1 MG/1
TABLET ORAL
Qty: 40 TABLET | Refills: 0 | Status: SHIPPED | OUTPATIENT
Start: 2023-11-24 | End: 2023-12-24

## 2023-11-29 ENCOUNTER — TELEPHONE (OUTPATIENT)
Dept: PRIMARY CARE CLINIC | Age: 58
End: 2023-11-29

## 2023-11-29 DIAGNOSIS — M79.7 FIBROMYALGIA: Primary | ICD-10-CM

## 2023-11-29 NOTE — TELEPHONE ENCOUNTER
Pt asking that her PM referral be faxed to Dr. Tari Gongora at the [facility] due to bad experience with [facility]. Referral faxed.

## 2023-12-11 ENCOUNTER — TELEPHONE (OUTPATIENT)
Dept: PRIMARY CARE CLINIC | Age: 58
End: 2023-12-11

## 2023-12-11 RX ORDER — AZITHROMYCIN 250 MG/1
250 TABLET, FILM COATED ORAL SEE ADMIN INSTRUCTIONS
Qty: 6 TABLET | Refills: 0 | Status: SHIPPED | OUTPATIENT
Start: 2023-12-11 | End: 2023-12-16

## 2023-12-11 NOTE — TELEPHONE ENCOUNTER
Pt just over covid. Tested neg this am. Still coughing up green phlegm and blowing out yellow/green. Asking if a Isaac Mate can be sent in to [facility] listed?

## 2023-12-26 DIAGNOSIS — G43.909 MIGRAINE WITHOUT STATUS MIGRAINOSUS, NOT INTRACTABLE, UNSPECIFIED MIGRAINE TYPE: ICD-10-CM

## 2023-12-26 DIAGNOSIS — F41.9 ANXIETY: ICD-10-CM

## 2023-12-26 RX ORDER — METHOCARBAMOL 750 MG/1
750 TABLET, FILM COATED ORAL 3 TIMES DAILY
Qty: 90 TABLET | Refills: 3 | Status: SHIPPED | OUTPATIENT
Start: 2023-12-26

## 2023-12-26 RX ORDER — GABAPENTIN 300 MG/1
CAPSULE ORAL
Qty: 180 CAPSULE | Refills: 0 | Status: SHIPPED | OUTPATIENT
Start: 2023-12-26 | End: 2024-01-25

## 2023-12-26 NOTE — TELEPHONE ENCOUNTER
LAST VISIT:   Visit date not found     Future Appointments   Date Time Provider [address]   5/6/2024  4:15 PM MD SHAYY Church

## 2023-12-26 NOTE — TELEPHONE ENCOUNTER
LAST VISIT:   Visit date not found     Future Appointments   Date Time Provider [address]   5/6/2024  4:15 PM MD SHAYY Reis

## 2023-12-27 RX ORDER — ALPRAZOLAM 1 MG/1
TABLET ORAL
Qty: 40 TABLET | Refills: 0 | Status: SHIPPED | OUTPATIENT
Start: 2023-12-27 | End: 2024-01-26

## 2023-12-27 RX ORDER — PHENOBARBITAL 64.8 MG/1
TABLET ORAL
Qty: 180 TABLET | Refills: 1 | Status: SHIPPED | OUTPATIENT
Start: 2023-12-27 | End: 2024-01-26

## 2024-01-16 ENCOUNTER — OFFICE VISIT (OUTPATIENT)
Dept: ORTHOPEDIC SURGERY | Age: 59
End: 2024-01-16
Payer: COMMERCIAL

## 2024-01-16 VITALS — BODY MASS INDEX: 22.08 KG/M2 | HEIGHT: 62 IN | WEIGHT: 120 LBS

## 2024-01-16 DIAGNOSIS — S72.001D CLOSED FRACTURE OF NECK OF RIGHT FEMUR WITH ROUTINE HEALING, SUBSEQUENT ENCOUNTER: Primary | ICD-10-CM

## 2024-01-16 PROCEDURE — G8420 CALC BMI NORM PARAMETERS: HCPCS | Performed by: ORTHOPAEDIC SURGERY

## 2024-01-16 PROCEDURE — G8427 DOCREV CUR MEDS BY ELIG CLIN: HCPCS | Performed by: ORTHOPAEDIC SURGERY

## 2024-01-16 PROCEDURE — 3017F COLORECTAL CA SCREEN DOC REV: CPT | Performed by: ORTHOPAEDIC SURGERY

## 2024-01-16 PROCEDURE — G8484 FLU IMMUNIZE NO ADMIN: HCPCS | Performed by: ORTHOPAEDIC SURGERY

## 2024-01-16 PROCEDURE — 1036F TOBACCO NON-USER: CPT | Performed by: ORTHOPAEDIC SURGERY

## 2024-01-16 RX ORDER — MELOXICAM 7.5 MG/1
7.5 TABLET ORAL DAILY
Qty: 30 TABLET | Refills: 0 | Status: SHIPPED | OUTPATIENT
Start: 2024-01-16

## 2024-01-16 NOTE — PROGRESS NOTES
[facility]  [address]  [place] [address]  Dept: [phone]  Dept Fax: [phone]        [facility] Follow Up      Subjective:   Date of Surgery: 2/14/2022    Devi Mckinney is a 58 y.o. year old female who presents to the clinic today for routine followup regarding her right total hip arthroplasty.  Patient states that she has been doing excellent following her total hip until the last 3 to 6 months.  Patient states that she has been able to exercise go to work and do the things that she enjoys after her operation.  Patient states that she longer complete her physical therapy and has been doing well when she started develop pain or the outside of her hip.  She describes a snapping and popping sensation over the last 3 to 6 months that has gotten progressively worse.  Patient states that she can no longer sleep on her right side due to the pain at night and extreme tenderness to palpation on the right hip.  She denies any new traumas, injuries, or falls.  She denies any new numbness, tingling or weakness.  Patient states that she has done well in every other regard with her hip until she started developing this pain.  She otherwise has no other orthopedic complaints or concerns at this time, and all of her questions were answered to her satisfaction.      Review of Systems  Gen: no fever, chills, malaise  CV: no chest pain or palpitations  Resp: no cough or shortness of breath  GI: no nausea, vomiting, diarrhea, or constipation  Neuro: no numbness, tingling, or weakness  Msk: Right hip pain  10 remaining systems reviewed and negative    Objective :   There were no vitals filed for this visit.Body mass index is 21.95 kg/m².  General: No acute distress, resting comfortably in the clinic  Neuro: alert. oriented  Eyes: Extra-ocular muscles intact  Pulm: Respirations unlabored and regular.  Skin: warm, well

## 2024-01-23 RX ORDER — GABAPENTIN 300 MG/1
600 CAPSULE ORAL 3 TIMES DAILY
Qty: 180 CAPSULE | Refills: 0 | Status: SHIPPED | OUTPATIENT
Start: 2024-01-23 | End: 2024-02-22

## 2024-01-27 DIAGNOSIS — G47.00 INSOMNIA, UNSPECIFIED TYPE: ICD-10-CM

## 2024-01-29 DIAGNOSIS — G40.909 SEIZURE DISORDER (HCC): Primary | ICD-10-CM

## 2024-01-29 RX ORDER — ALPRAZOLAM 1 MG/1
1 TABLET ORAL NIGHTLY PRN
Qty: 90 TABLET | Refills: 1 | Status: SHIPPED | OUTPATIENT
Start: 2024-01-29 | End: 2024-07-27

## 2024-01-29 RX ORDER — DULOXETIN HYDROCHLORIDE 60 MG/1
60 CAPSULE, DELAYED RELEASE ORAL DAILY
Qty: 90 CAPSULE | Refills: 3 | Status: SHIPPED | OUTPATIENT
Start: 2024-01-29

## 2024-02-13 ENCOUNTER — TELEPHONE (OUTPATIENT)
Dept: NEUROLOGY | Age: 59
End: 2024-02-13

## 2024-02-13 NOTE — TELEPHONE ENCOUNTER
02 13 2024 I called the patient times 2 (02 06 2024 and 02 13 2024 at  ) to schedule new patient appointment with one of our providers, LUDY both times, no response.  I mailed the patient a letter asking them to call the office back to schedule this appointment.  KS

## 2024-02-26 RX ORDER — GABAPENTIN 300 MG/1
CAPSULE ORAL
Qty: 180 CAPSULE | Refills: 0 | Status: SHIPPED | OUTPATIENT
Start: 2024-02-26 | End: 2024-03-27

## 2024-02-26 RX ORDER — HYDRALAZINE HYDROCHLORIDE 10 MG/1
TABLET, FILM COATED ORAL
Qty: 360 TABLET | Refills: 2 | Status: SHIPPED | OUTPATIENT
Start: 2024-02-26

## 2024-02-27 ENCOUNTER — OFFICE VISIT (OUTPATIENT)
Dept: ORTHOPEDIC SURGERY | Age: 59
End: 2024-02-27
Payer: COMMERCIAL

## 2024-02-27 VITALS — BODY MASS INDEX: 22.86 KG/M2 | HEIGHT: 62 IN | WEIGHT: 124.2 LBS

## 2024-02-27 DIAGNOSIS — S72.001D CLOSED FRACTURE OF NECK OF RIGHT FEMUR WITH ROUTINE HEALING, SUBSEQUENT ENCOUNTER: ICD-10-CM

## 2024-02-27 DIAGNOSIS — G95.9 CERVICAL MYELOPATHY (HCC): Primary | ICD-10-CM

## 2024-02-27 PROCEDURE — G8420 CALC BMI NORM PARAMETERS: HCPCS | Performed by: ORTHOPAEDIC SURGERY

## 2024-02-27 PROCEDURE — 99213 OFFICE O/P EST LOW 20 MIN: CPT | Performed by: ORTHOPAEDIC SURGERY

## 2024-02-27 PROCEDURE — G8427 DOCREV CUR MEDS BY ELIG CLIN: HCPCS | Performed by: ORTHOPAEDIC SURGERY

## 2024-02-27 PROCEDURE — 3017F COLORECTAL CA SCREEN DOC REV: CPT | Performed by: ORTHOPAEDIC SURGERY

## 2024-02-27 PROCEDURE — 1036F TOBACCO NON-USER: CPT | Performed by: ORTHOPAEDIC SURGERY

## 2024-02-27 PROCEDURE — G8484 FLU IMMUNIZE NO ADMIN: HCPCS | Performed by: ORTHOPAEDIC SURGERY

## 2024-02-27 NOTE — TELEPHONE ENCOUNTER
Medication refill request from pharmacy. Please advise if refill appropriate.     right greater trochanteric bursitis.  Prior right DENIZ.  DOS: 2/14/2022

## 2024-02-28 RX ORDER — MELOXICAM 7.5 MG/1
7.5 TABLET ORAL DAILY
Qty: 30 TABLET | Refills: 0 | Status: SHIPPED | OUTPATIENT
Start: 2024-02-28

## 2024-02-29 NOTE — PROGRESS NOTES
positive sagittal balance noticed.  Notable paraspinal tenderness.  No gross step-offs noted.    Upper extremities: No gross deformities noted.  Bilateral motor 5/5 to manual resistance C5-T1.  Bilaterally sensation intact C4-T1.  Bilateral reflexes C5-T1 2+.  Left C5 3+ reflex.  Bilateral Nataly sign negative.  Bilateral finger escape sign positive.  No clonus noted.  Bilaterally the hands are warm and well-perfused with BCR.    Lower extremities:  Right lower extremity: No gross deformity noted.  Motor L3-5/5, L4-5/5, L5-4/5, S1-4+/5.  Sensation L3-S1 intact.  L3-4, S1-S2 reflexes 3+.  The foot is warm and well-perfused with BCR.  Left lower extremity: No gross deformities noted.  Motor L3-4/5, L4-/5, L5-/5, S1-/5.  Sensation L3-S1 intact.  L3-4, S1-S2 reflexes 3+.  The foot is warm and well-perfused with BCR.    Radiology:  No new radiographs obtained today.  Previous radiographs reviewed.     Assessment:   58 y.o. year old female being seen for:    1.  Right total hip arthroplasty  2.  Cervical myelopathy    Plan:      Patient is a 58-year-old female seen and evaluated in clinic today.  We had extensive discussion regarding her current clinical state.  We reviewed her radiographs together.    - She received significant improvement in her right hip pain from the corticosteroid injection.    - Most concerning to her today is her overall weakness.  Based off of physical exam and her presenting history she has symptoms of cervical myelopathy.  We will provide a referral to Dr. Elizabeth today.      -Patient will follow-up with Dr. Elizabeth if she has any issues or her right hip pain returns she will call our office to be seen.  Patient voices understanding and agrees with this plan.    Follow up:Return if symptoms worsen or fail to improve.    No orders of the defined types were placed in this encounter.         Orders Placed This Encounter   Procedures    CORI - Rob Elizabeth MD, Orthopaedic Surgery, [place]

## 2024-03-03 ENCOUNTER — APPOINTMENT (OUTPATIENT)
Dept: CT IMAGING | Age: 59
End: 2024-03-03
Payer: COMMERCIAL

## 2024-03-03 ENCOUNTER — HOSPITAL ENCOUNTER (EMERGENCY)
Age: 59
Discharge: HOME | End: 2024-03-03
Attending: EMERGENCY MEDICINE
Payer: COMMERCIAL

## 2024-03-03 VITALS
SYSTOLIC BLOOD PRESSURE: 144 MMHG | TEMPERATURE: 97.9 F | RESPIRATION RATE: 16 BRPM | HEART RATE: 67 BPM | OXYGEN SATURATION: 99 % | DIASTOLIC BLOOD PRESSURE: 95 MMHG

## 2024-03-03 DIAGNOSIS — R10.13 EPIGASTRIC PAIN: Primary | ICD-10-CM

## 2024-03-03 LAB
ALBUMIN SERPL-MCNC: 4.2 G/DL (ref 3.5–5.2)
ALBUMIN/GLOB SERPL: 1.8 {RATIO} (ref 1–2.5)
ALP SERPL-CCNC: 98 U/L (ref 35–104)
ALT SERPL-CCNC: 20 U/L (ref 5–33)
ANION GAP SERPL CALCULATED.3IONS-SCNC: 8 MMOL/L (ref 9–17)
AST SERPL-CCNC: 20 U/L
BASOPHILS # BLD: 0 K/UL (ref 0–0.2)
BASOPHILS NFR BLD: 1 % (ref 0–2)
BILIRUB SERPL-MCNC: 0.2 MG/DL (ref 0.3–1.2)
BILIRUB UR QL STRIP: NEGATIVE
BUN SERPL-MCNC: 8 MG/DL (ref 6–20)
CALCIUM SERPL-MCNC: 9.6 MG/DL (ref 8.6–10.4)
CHLORIDE SERPL-SCNC: 105 MMOL/L (ref 98–107)
CLARITY UR: CLEAR
CO2 SERPL-SCNC: 28 MMOL/L (ref 20–31)
COLOR UR: YELLOW
COMMENT: NORMAL
CREAT SERPL-MCNC: 0.6 MG/DL (ref 0.5–0.9)
EOSINOPHIL # BLD: 0.1 K/UL (ref 0–0.4)
EOSINOPHILS RELATIVE PERCENT: 1 % (ref 1–4)
ERYTHROCYTE [DISTWIDTH] IN BLOOD BY AUTOMATED COUNT: 14.8 % (ref 12.5–15.4)
GFR SERPL CREATININE-BSD FRML MDRD: >60 ML/MIN/1.73M2
GLUCOSE SERPL-MCNC: 86 MG/DL (ref 70–99)
GLUCOSE UR STRIP-MCNC: NEGATIVE MG/DL
HCT VFR BLD AUTO: 39.1 % (ref 36–46)
HGB BLD-MCNC: 13.2 G/DL (ref 12–16)
HGB UR QL STRIP.AUTO: NEGATIVE
KETONES UR STRIP-MCNC: NEGATIVE MG/DL
LEUKOCYTE ESTERASE UR QL STRIP: NEGATIVE
LIPASE SERPL-CCNC: 36 U/L (ref 13–60)
LYMPHOCYTES NFR BLD: 1.8 K/UL (ref 1–4.8)
LYMPHOCYTES RELATIVE PERCENT: 40 % (ref 24–44)
MCH RBC QN AUTO: 30.7 PG (ref 26–34)
MCHC RBC AUTO-ENTMCNC: 33.7 G/DL (ref 31–37)
MCV RBC AUTO: 91.1 FL (ref 80–100)
MONOCYTES NFR BLD: 0.2 K/UL (ref 0.1–1.2)
MONOCYTES NFR BLD: 5 % (ref 2–11)
NEUTROPHILS NFR BLD: 53 % (ref 36–66)
NEUTS SEG NFR BLD: 2.5 K/UL (ref 1.8–7.7)
NITRITE UR QL STRIP: NEGATIVE
PH UR STRIP: 6 [PH] (ref 5–8)
PLATELET # BLD AUTO: 262 K/UL (ref 140–450)
PMV BLD AUTO: 7.5 FL (ref 6–12)
POTASSIUM SERPL-SCNC: 3.6 MMOL/L (ref 3.7–5.3)
PROT SERPL-MCNC: 6.5 G/DL (ref 6.4–8.3)
PROT UR STRIP-MCNC: NEGATIVE MG/DL
RBC # BLD AUTO: 4.3 M/UL (ref 4–5.2)
SODIUM SERPL-SCNC: 141 MMOL/L (ref 135–144)
SP GR UR STRIP: 1.02 (ref 1–1.03)
UROBILINOGEN UR STRIP-ACNC: NORMAL EU/DL (ref 0–1)
WBC OTHER # BLD: 4.6 K/UL (ref 3.5–11)

## 2024-03-03 PROCEDURE — 80053 COMPREHEN METABOLIC PANEL: CPT

## 2024-03-03 PROCEDURE — 81003 URINALYSIS AUTO W/O SCOPE: CPT

## 2024-03-03 PROCEDURE — A4216 STERILE WATER/SALINE, 10 ML: HCPCS | Performed by: NURSE PRACTITIONER

## 2024-03-03 PROCEDURE — 96374 THER/PROPH/DIAG INJ IV PUSH: CPT

## 2024-03-03 PROCEDURE — 96375 TX/PRO/DX INJ NEW DRUG ADDON: CPT

## 2024-03-03 PROCEDURE — 74176 CT ABD & PELVIS W/O CONTRAST: CPT

## 2024-03-03 PROCEDURE — 36415 COLL VENOUS BLD VENIPUNCTURE: CPT

## 2024-03-03 PROCEDURE — 83690 ASSAY OF LIPASE: CPT

## 2024-03-03 PROCEDURE — 6370000000 HC RX 637 (ALT 250 FOR IP): Performed by: NURSE PRACTITIONER

## 2024-03-03 PROCEDURE — 99284 EMERGENCY DEPT VISIT MOD MDM: CPT

## 2024-03-03 PROCEDURE — 85025 COMPLETE CBC W/AUTO DIFF WBC: CPT

## 2024-03-03 PROCEDURE — 6360000002 HC RX W HCPCS: Performed by: NURSE PRACTITIONER

## 2024-03-03 PROCEDURE — 2580000003 HC RX 258: Performed by: NURSE PRACTITIONER

## 2024-03-03 PROCEDURE — C9113 INJ PANTOPRAZOLE SODIUM, VIA: HCPCS | Performed by: NURSE PRACTITIONER

## 2024-03-03 RX ORDER — MAGNESIUM HYDROXIDE/ALUMINUM HYDROXICE/SIMETHICONE 120; 1200; 1200 MG/30ML; MG/30ML; MG/30ML
30 SUSPENSION ORAL ONCE
Status: COMPLETED | OUTPATIENT
Start: 2024-03-03 | End: 2024-03-03

## 2024-03-03 RX ORDER — ONDANSETRON 2 MG/ML
4 INJECTION INTRAMUSCULAR; INTRAVENOUS ONCE
Status: COMPLETED | OUTPATIENT
Start: 2024-03-03 | End: 2024-03-03

## 2024-03-03 RX ORDER — PANTOPRAZOLE SODIUM 20 MG/1
20 TABLET, DELAYED RELEASE ORAL DAILY
Qty: 30 TABLET | Refills: 0 | Status: SHIPPED | OUTPATIENT
Start: 2024-03-03

## 2024-03-03 RX ORDER — 0.9 % SODIUM CHLORIDE 0.9 %
1000 INTRAVENOUS SOLUTION INTRAVENOUS ONCE
Status: COMPLETED | OUTPATIENT
Start: 2024-03-03 | End: 2024-03-03

## 2024-03-03 RX ORDER — LIDOCAINE HYDROCHLORIDE 20 MG/ML
10 SOLUTION OROPHARYNGEAL ONCE
Status: COMPLETED | OUTPATIENT
Start: 2024-03-03 | End: 2024-03-03

## 2024-03-03 RX ADMIN — LIDOCAINE HYDROCHLORIDE 10 ML: 20 SOLUTION ORAL; TOPICAL at 17:48

## 2024-03-03 RX ADMIN — ONDANSETRON 4 MG: 2 INJECTION INTRAMUSCULAR; INTRAVENOUS at 17:48

## 2024-03-03 RX ADMIN — SODIUM CHLORIDE 40 MG: 9 INJECTION INTRAMUSCULAR; INTRAVENOUS; SUBCUTANEOUS at 18:47

## 2024-03-03 RX ADMIN — SODIUM CHLORIDE 1000 ML: 9 INJECTION, SOLUTION INTRAVENOUS at 17:47

## 2024-03-03 RX ADMIN — ALUMINUM HYDROXIDE, MAGNESIUM HYDROXIDE, AND SIMETHICONE 30 ML: 200; 200; 20 SUSPENSION ORAL at 17:48

## 2024-03-03 ASSESSMENT — PAIN DESCRIPTION - LOCATION: LOCATION: ABDOMEN;BACK

## 2024-03-03 ASSESSMENT — PAIN DESCRIPTION - DESCRIPTORS: DESCRIPTORS: SHARP

## 2024-03-03 ASSESSMENT — PAIN - FUNCTIONAL ASSESSMENT: PAIN_FUNCTIONAL_ASSESSMENT: 0-10

## 2024-03-03 ASSESSMENT — PAIN SCALES - GENERAL: PAINLEVEL_OUTOF10: 7

## 2024-03-03 NOTE — ED PROVIDER NOTES
Attending Supervisory Note/Shared Visit   I have personally performed a face to face diagnostic evaluation on this patient. I have reviewed the mid-level’s findings and agree.     (Please note that portions of this note were completed with a voice recognition program.  Efforts were made to edit the dictations but occasionally words are mis-transcribed.)    Marciano Farmer MD  Attending Emergency Physician        Marciano Farmer MD  03/03/24 [id number]

## 2024-03-03 NOTE — ED TRIAGE NOTES
PT present to ED with c/o right upper quadrant ABD pain that also goes to pts back. PT states it has been going on for a few weeks  and Has been getting worse. Pt denies taking any over the counter medications to aide in pain relief

## 2024-03-03 NOTE — ED PROVIDER NOTES
[facility] EMERGENCY DEPARTMENT  Emergency Department Encounter  Mid Level Provider     Pt Name: Devi Mckinney  MRN: [id number]  Birthdate 1965  Date of evaluation: 3/3/24  PCP:  Jose G Michel MD    CHIEF COMPLAINT       Chief Complaint   Patient presents with    Abdominal Pain    Back Pain       HISTORY OF PRESENT ILLNESS  (Location/Symptom, Timing/Onset,Context/Setting, Quality, Duration, Modifying Factors, Severity.)      Devi Mckinney is a 58 y.o. female who presents with right upper quadrant pain and epigastric pain for the last 3 weeks but intensified more recently.  Went to urgent care was sent to the ER for further evaluation.  Has had some nausea and vomiting.  History of cholecystectomy, appendectomy.  She states she was scoped many years ago and was told she had ulcers.  No urinary symptoms.  No fever or chills.  No diarrhea and mild constipation    PAST MEDICAL /SURGICAL / SOCIAL / FAMILY HISTORY      has a past medical history of Migraines, Osteoarthritis, Seizures (HCC), and Vertigo.     has a past surgical history that includes shoulder surgery (Left, ); Mandible fracture surgery;  section; Hysterectomy, total abdominal; Total knee arthroplasty (Right, 2019); Total knee arthroplasty (Left, 2017); Cholecystectomy, laparoscopic (); Hip Arthroplasty (Right, 2022); and hip surgery (Right, 2022).    Social History     Socioeconomic History    Marital status:      Spouse name: Not on file    Number of children: Not on file    Years of education: Not on file    Highest education level: Not on file   Occupational History    Not on file   Tobacco Use    Smoking status: Never    Smokeless tobacco: Never   Vaping Use    Vaping Use: Never used   Substance and Sexual Activity    Alcohol use: Not Currently     Comment: Socially    Drug use: Never    Sexual activity: Not Currently   Other Topics Concern    Not on file   Social History

## 2024-03-03 NOTE — DISCHARGE INSTRUCTIONS
Please call your family provider for close follow-up appointment.  I have provided referral to a GI service.  Please take the Protonix daily with next dose being tomorrow.  If symptoms worsen or new concerns develop return to the emergency room

## 2024-03-07 RX ORDER — SUMATRIPTAN 100 MG/1
TABLET, FILM COATED ORAL
Qty: 30 TABLET | Refills: 0 | Status: SHIPPED | OUTPATIENT
Start: 2024-03-07

## 2024-03-07 RX ORDER — METHOCARBAMOL 750 MG/1
750 TABLET, FILM COATED ORAL 3 TIMES DAILY
Qty: 270 TABLET | Refills: 0 | Status: SHIPPED | OUTPATIENT
Start: 2024-03-07

## 2024-03-13 ENCOUNTER — TELEPHONE (OUTPATIENT)
Dept: PRIMARY CARE CLINIC | Age: 59
End: 2024-03-13

## 2024-03-13 NOTE — TELEPHONE ENCOUNTER
Pt having abd pain, bloating x4 weeks. Was seen at hospital but wants to discuss with Dr. Michel before she gets the testing done. [phone] or [phone]

## 2024-03-25 RX ORDER — GABAPENTIN 300 MG/1
600 CAPSULE ORAL 3 TIMES DAILY
Qty: 180 CAPSULE | Refills: 0 | Status: SHIPPED | OUTPATIENT
Start: 2024-03-25 | End: 2024-04-24

## 2024-03-25 RX ORDER — LOSARTAN POTASSIUM 50 MG/1
50 TABLET ORAL DAILY
Qty: 90 TABLET | Refills: 3 | Status: SHIPPED | OUTPATIENT
Start: 2024-03-25

## 2024-04-08 RX ORDER — ONDANSETRON 4 MG/1
TABLET, ORALLY DISINTEGRATING ORAL
Qty: 270 TABLET | Refills: 0 | Status: SHIPPED | OUTPATIENT
Start: 2024-04-08

## 2024-04-15 DIAGNOSIS — G43.909 MIGRAINE WITHOUT STATUS MIGRAINOSUS, NOT INTRACTABLE, UNSPECIFIED MIGRAINE TYPE: ICD-10-CM

## 2024-04-16 RX ORDER — PHENOBARBITAL 64.8 MG/1
TABLET ORAL
Qty: 180 TABLET | Refills: 0 | Status: SHIPPED | OUTPATIENT
Start: 2024-04-16 | End: 2024-05-16

## 2024-04-29 ENCOUNTER — TELEPHONE (OUTPATIENT)
Dept: PRIMARY CARE CLINIC | Age: 59
End: 2024-04-29

## 2024-04-29 RX ORDER — GABAPENTIN 300 MG/1
CAPSULE ORAL
Qty: 60 CAPSULE | Refills: 0 | Status: SHIPPED | OUTPATIENT
Start: 2024-04-29 | End: 2024-05-09

## 2024-04-29 NOTE — TELEPHONE ENCOUNTER
Pt asking if fmla forms were received and if so what the status is of them being filled out? Please contact pt

## 2024-04-30 ENCOUNTER — TELEPHONE (OUTPATIENT)
Dept: PRIMARY CARE CLINIC | Age: 59
End: 2024-04-30

## 2024-04-30 NOTE — TELEPHONE ENCOUNTER
Was just sent in April 16, cannot change medication  Should discuss with her new pcp 5/3 at her appointment

## 2024-04-30 NOTE — TELEPHONE ENCOUNTER
See other message. Pt was off 4/18 and returned to work on 4/29. States that her boss is getting on her about the p-work.

## 2024-04-30 NOTE — TELEPHONE ENCOUNTER
Pt asking if you would send in the Phenobarbital with an increase on how often she takes it? Pt went back up to tid, from bid due to having more seizures.     Uses Dennise Abbott listed.

## 2024-05-01 NOTE — TELEPHONE ENCOUNTER
Left message for patient to call office back. Received forms last Thursday and spoke to Dr. Michel because we had not seen patient since November and she has new patient appt. Scheduled at another primary care office. Dr. Michel stated patient would have to wait until her scheduled appt. With Dr. Michel to get forms filled out, he did not write patient off work.

## 2024-05-02 NOTE — TELEPHONE ENCOUNTER
Patient called back. Patient states she was going to be fired if the forms were not completed. Patient was very upset and seemed confused.    I explained that if she kept the new patient appointment with the new provider she would no longer be a patient at this office as you can not have multiple primary care providers.     Patient did not understand why forms could not be completed as she's been a patient with Dr. Michel for years and he is familiar with her disorder. I explained that given the complexity of the FMLA forms it is common for providers in our office to have patients come in to discuss the paperwork to ensure its completed properly.    Patient verbalized understanding. Patient did not confirm if she was going to keep the new patient appointment with the other office or not.

## 2024-05-03 ENCOUNTER — OFFICE VISIT (OUTPATIENT)
Age: 59
End: 2024-05-03
Payer: COMMERCIAL

## 2024-05-03 VITALS
DIASTOLIC BLOOD PRESSURE: 95 MMHG | RESPIRATION RATE: 12 BRPM | HEART RATE: 71 BPM | SYSTOLIC BLOOD PRESSURE: 141 MMHG | BODY MASS INDEX: 23.63 KG/M2 | HEIGHT: 62 IN | WEIGHT: 128.4 LBS

## 2024-05-03 DIAGNOSIS — G47.00 INSOMNIA, UNSPECIFIED TYPE: Primary | ICD-10-CM

## 2024-05-03 DIAGNOSIS — M15.9 PRIMARY OSTEOARTHRITIS INVOLVING MULTIPLE JOINTS: ICD-10-CM

## 2024-05-03 DIAGNOSIS — G43.009 MIGRAINE WITHOUT AURA AND WITHOUT STATUS MIGRAINOSUS, NOT INTRACTABLE: ICD-10-CM

## 2024-05-03 DIAGNOSIS — M79.7 FIBROMYALGIA: ICD-10-CM

## 2024-05-03 DIAGNOSIS — K21.9 GASTROESOPHAGEAL REFLUX DISEASE, UNSPECIFIED WHETHER ESOPHAGITIS PRESENT: ICD-10-CM

## 2024-05-03 DIAGNOSIS — G40.909 SEIZURE DISORDER (HCC): ICD-10-CM

## 2024-05-03 DIAGNOSIS — M54.50 CHRONIC BILATERAL LOW BACK PAIN WITHOUT SCIATICA: ICD-10-CM

## 2024-05-03 DIAGNOSIS — G89.29 CHRONIC BILATERAL LOW BACK PAIN WITHOUT SCIATICA: ICD-10-CM

## 2024-05-03 PROCEDURE — G8427 DOCREV CUR MEDS BY ELIG CLIN: HCPCS | Performed by: FAMILY MEDICINE

## 2024-05-03 PROCEDURE — 99215 OFFICE O/P EST HI 40 MIN: CPT | Performed by: FAMILY MEDICINE

## 2024-05-03 PROCEDURE — G8420 CALC BMI NORM PARAMETERS: HCPCS | Performed by: FAMILY MEDICINE

## 2024-05-03 PROCEDURE — 3017F COLORECTAL CA SCREEN DOC REV: CPT | Performed by: FAMILY MEDICINE

## 2024-05-03 PROCEDURE — 1036F TOBACCO NON-USER: CPT | Performed by: FAMILY MEDICINE

## 2024-05-03 RX ORDER — DIAZEPAM 5 MG/1
5 TABLET ORAL EVERY 6 HOURS PRN
COMMUNITY
Start: 2024-04-09

## 2024-05-03 RX ORDER — MECLIZINE HYDROCHLORIDE 25 MG/1
25 TABLET ORAL 3 TIMES DAILY PRN
COMMUNITY

## 2024-05-03 RX ORDER — ALPRAZOLAM 1 MG/1
1 TABLET ORAL NIGHTLY PRN
Qty: 30 TABLET | Refills: 1 | Status: SHIPPED | OUTPATIENT
Start: 2024-05-03 | End: 2024-10-30

## 2024-05-03 SDOH — ECONOMIC STABILITY: INCOME INSECURITY: HOW HARD IS IT FOR YOU TO PAY FOR THE VERY BASICS LIKE FOOD, HOUSING, MEDICAL CARE, AND HEATING?: HARD

## 2024-05-03 SDOH — ECONOMIC STABILITY: FOOD INSECURITY: WITHIN THE PAST 12 MONTHS, THE FOOD YOU BOUGHT JUST DIDN'T LAST AND YOU DIDN'T HAVE MONEY TO GET MORE.: NEVER TRUE

## 2024-05-03 SDOH — ECONOMIC STABILITY: TRANSPORTATION INSECURITY
IN THE PAST 12 MONTHS, HAS LACK OF TRANSPORTATION KEPT YOU FROM MEETINGS, WORK, OR FROM GETTING THINGS NEEDED FOR DAILY LIVING?: NO

## 2024-05-03 SDOH — SOCIAL STABILITY: SOCIAL NETWORK: HOW OFTEN DO YOU GET TOGETHER WITH FRIENDS OR RELATIVES?: ONCE A WEEK

## 2024-05-03 SDOH — SOCIAL STABILITY: SOCIAL NETWORK: HOW OFTEN DO YOU ATTEND CHURCH OR RELIGIOUS SERVICES?: MORE THAN 4 TIMES PER YEAR

## 2024-05-03 SDOH — HEALTH STABILITY: MENTAL HEALTH: HOW OFTEN DO YOU HAVE A DRINK CONTAINING ALCOHOL?: MONTHLY OR LESS

## 2024-05-03 SDOH — SOCIAL STABILITY: SOCIAL NETWORK: IN A TYPICAL WEEK, HOW MANY TIMES DO YOU TALK ON THE PHONE WITH FAMILY, FRIENDS, OR NEIGHBORS?: THREE TIMES A WEEK

## 2024-05-03 SDOH — SOCIAL STABILITY: SOCIAL INSECURITY: WITHIN THE LAST YEAR, HAVE YOU BEEN HUMILIATED OR EMOTIONALLY ABUSED IN OTHER WAYS BY YOUR PARTNER OR EX-PARTNER?: NO

## 2024-05-03 SDOH — SOCIAL STABILITY: SOCIAL NETWORK
DO YOU BELONG TO ANY CLUBS OR ORGANIZATIONS SUCH AS CHURCH GROUPS UNIONS, FRATERNAL OR ATHLETIC GROUPS, OR SCHOOL GROUPS?: NO

## 2024-05-03 SDOH — ECONOMIC STABILITY: TRANSPORTATION INSECURITY
IN THE PAST 12 MONTHS, HAS THE LACK OF TRANSPORTATION KEPT YOU FROM MEDICAL APPOINTMENTS OR FROM GETTING MEDICATIONS?: NO

## 2024-05-03 SDOH — SOCIAL STABILITY: SOCIAL INSECURITY: WITHIN THE LAST YEAR, HAVE YOU BEEN AFRAID OF YOUR PARTNER OR EX-PARTNER?: NO

## 2024-05-03 SDOH — ECONOMIC STABILITY: HOUSING INSECURITY: IN THE LAST 12 MONTHS, HOW MANY PLACES HAVE YOU LIVED?: 1

## 2024-05-03 SDOH — ECONOMIC STABILITY: INCOME INSECURITY: IN THE LAST 12 MONTHS, WAS THERE A TIME WHEN YOU WERE NOT ABLE TO PAY THE MORTGAGE OR RENT ON TIME?: YES

## 2024-05-03 SDOH — SOCIAL STABILITY: SOCIAL INSECURITY
WITHIN THE LAST YEAR, HAVE TO BEEN RAPED OR FORCED TO HAVE ANY KIND OF SEXUAL ACTIVITY BY YOUR PARTNER OR EX-PARTNER?: NO

## 2024-05-03 SDOH — HEALTH STABILITY: PHYSICAL HEALTH: ON AVERAGE, HOW MANY DAYS PER WEEK DO YOU ENGAGE IN MODERATE TO STRENUOUS EXERCISE (LIKE A BRISK WALK)?: 0 DAYS

## 2024-05-03 SDOH — HEALTH STABILITY: PHYSICAL HEALTH: ON AVERAGE, HOW MANY MINUTES DO YOU ENGAGE IN EXERCISE AT THIS LEVEL?: 0 MIN

## 2024-05-03 SDOH — SOCIAL STABILITY: SOCIAL NETWORK: HOW OFTEN DO YOU ATTENT MEETINGS OF THE CLUB OR ORGANIZATION YOU BELONG TO?: NEVER

## 2024-05-03 SDOH — ECONOMIC STABILITY: FOOD INSECURITY: WITHIN THE PAST 12 MONTHS, YOU WORRIED THAT YOUR FOOD WOULD RUN OUT BEFORE YOU GOT MONEY TO BUY MORE.: NEVER TRUE

## 2024-05-03 SDOH — SOCIAL STABILITY: SOCIAL NETWORK: ARE YOU MARRIED, WIDOWED, DIVORCED, SEPARATED, NEVER MARRIED, OR LIVING WITH A PARTNER?: WIDOWED

## 2024-05-03 SDOH — SOCIAL STABILITY: SOCIAL INSECURITY
WITHIN THE LAST YEAR, HAVE YOU BEEN KICKED, HIT, SLAPPED, OR OTHERWISE PHYSICALLY HURT BY YOUR PARTNER OR EX-PARTNER?: NO

## 2024-05-03 SDOH — HEALTH STABILITY: MENTAL HEALTH
STRESS IS WHEN SOMEONE FEELS TENSE, NERVOUS, ANXIOUS, OR CAN'T SLEEP AT NIGHT BECAUSE THEIR MIND IS TROUBLED. HOW STRESSED ARE YOU?: TO SOME EXTENT

## 2024-05-03 SDOH — HEALTH STABILITY: MENTAL HEALTH: HOW MANY STANDARD DRINKS CONTAINING ALCOHOL DO YOU HAVE ON A TYPICAL DAY?: PATIENT DOES NOT DRINK

## 2024-05-03 ASSESSMENT — PATIENT HEALTH QUESTIONNAIRE - PHQ9
SUM OF ALL RESPONSES TO PHQ QUESTIONS 1-9: 18
4. FEELING TIRED OR HAVING LITTLE ENERGY: NEARLY EVERY DAY
SUM OF ALL RESPONSES TO PHQ9 QUESTIONS 1 & 2: 3
SUM OF ALL RESPONSES TO PHQ QUESTIONS 1-9: 18
8. MOVING OR SPEAKING SO SLOWLY THAT OTHER PEOPLE COULD HAVE NOTICED. OR THE OPPOSITE, BEING SO FIGETY OR RESTLESS THAT YOU HAVE BEEN MOVING AROUND A LOT MORE THAN USUAL: NEARLY EVERY DAY
SUM OF ALL RESPONSES TO PHQ QUESTIONS 1-9: 18
7. TROUBLE CONCENTRATING ON THINGS, SUCH AS READING THE NEWSPAPER OR WATCHING TELEVISION: NEARLY EVERY DAY
1. LITTLE INTEREST OR PLEASURE IN DOING THINGS: NEARLY EVERY DAY
2. FEELING DOWN, DEPRESSED OR HOPELESS: NOT AT ALL
SUM OF ALL RESPONSES TO PHQ QUESTIONS 1-9: 18
9. THOUGHTS THAT YOU WOULD BE BETTER OFF DEAD, OR OF HURTING YOURSELF: NOT AT ALL
10. IF YOU CHECKED OFF ANY PROBLEMS, HOW DIFFICULT HAVE THESE PROBLEMS MADE IT FOR YOU TO DO YOUR WORK, TAKE CARE OF THINGS AT HOME, OR GET ALONG WITH OTHER PEOPLE: EXTREMELY DIFFICULT
6. FEELING BAD ABOUT YOURSELF - OR THAT YOU ARE A FAILURE OR HAVE LET YOURSELF OR YOUR FAMILY DOWN: NOT AT ALL
3. TROUBLE FALLING OR STAYING ASLEEP: NEARLY EVERY DAY
5. POOR APPETITE OR OVEREATING: NEARLY EVERY DAY

## 2024-05-03 NOTE — PATIENT INSTRUCTIONS
[facility] Financial Resources*  (Call 211 if need more resources).     [facility] Veterans Service Commission:  What they offer:  Electric and gas payment services for veterans  Phone Number: (521) 996-6971 Service-Intake    [facility] Action Partnership (GLCAP):   [place], [place], [place], [place], [place]ys Veliz  counites  What they offer: Assistance with utility and housing expenses.  Phone Number: 886.581.9059     [facility] Community Action Commission (NOCAC):   [place], [place], [place], and [place] counites  What they offer: Services for homelessness, housing, and home weatherization and repair.  Phone Number: 245.563.3353    [employer]:   [facility]  What they offer: Heating Fuel Payment Assistance, Electric Service Payment Assistance and Water Service Payment Assistance  Phone Number: (405) 981-9555 ext: x11 Service-Intake    [employer]:  What they offer: Heat, Electric, Gas and water payment services.  Phone Number: (126) 502-4912 Service-Intake    Community Action Commission of [place] Counites:  What they offer: Electric, gas and water payment service.  Phone: 530.571.1002        [place] Department of Job and Family Services (ODJSF):  What they offer: Government programs including Medicaid, SNAP(food stamps), TANF (cash assistance), and childcare assistance.  Phone Number: 754.345.7256    Area Office on Aging of [facility] ([facility]):  What they offer: Medical cab rides for seniors and referral to community resources  Phone Number: [phone]     Area Agency on Aging, District 5:    [place], [place], [place], [place], [place], [place], [place], [place],  [facility]:  What they offer: Referral to transportation and other resources for seniors.  Phone Number: [phone]     Medications/Medical    [facility] Financial Assistance  What they offer: Assistance with [facility] bills  Phone: [phone];

## 2024-05-03 NOTE — PROGRESS NOTES
[facility]  [address] [place] [address]  Phone: [phone]  Fax: [phone]      Date of Visit:  5/3/24  Patient Name: Devi Mckinney   Patient :  1965     ASSESSMENT/PLAN     1. Insomnia, unspecified type  Assessment & Plan:  Patient is taking the Xanax for help with sleep onset insomnia.  She reports taking half a tablet up to a whole tablet 2 possibly 3 times a week so 30 tablets with 1 refill should last her at least 6 months 180 days.    Orders:  -     ALPRAZolam (XANAX) 1 MG tablet; Take 1 tablet by mouth nightly as needed for Sleep for up to 180 days. Max Daily Amount: 1 mg, Disp-30 tablet, R-1Normal  2. Migraine without aura and without status migrainosus, not intractable  Assessment & Plan:  Encourage patient to keep her follow-up appointment with neurology.    3. Seizure disorder (HCC)  Assessment & Plan:  Patient has adjusted her discharge instructions from review of chart currently taking phenobarbital 3 times a day and gabapentin 600 mg 3 times a day.  She is also not taking the same amount of Xanax was written in her discharge summary but only at bedtime as needed.  The liver because she is not taking.  Needs to follow-up with neurology with regards to her seizure history.  She is complaining of feeling tired and I suggest that she stop the Robaxin because of the drug drug interactions associated with it.    4. Primary osteoarthritis involving multiple joints  Assessment & Plan:  Suggest she continue to follow-up with chronic pain management.    5. Chronic bilateral low back pain without sciatica  6. Fibromyalgia  Assessment & Plan:  The use of pain medication and muscle relaxants really has not been shown to be clearly effective with fibromyalgia.  The dose of Cymbalta that she is taking in addition to the Neurontin certainly should be more than enough in terms of medication.    7. Gastroesophageal reflux disease,

## 2024-05-06 ENCOUNTER — OFFICE VISIT (OUTPATIENT)
Dept: PRIMARY CARE CLINIC | Age: 59
End: 2024-05-06
Payer: COMMERCIAL

## 2024-05-06 VITALS
HEIGHT: 61 IN | SYSTOLIC BLOOD PRESSURE: 120 MMHG | DIASTOLIC BLOOD PRESSURE: 80 MMHG | OXYGEN SATURATION: 95 % | BODY MASS INDEX: 24.35 KG/M2 | HEART RATE: 74 BPM | WEIGHT: 129 LBS

## 2024-05-06 DIAGNOSIS — G43.909 MIGRAINE WITHOUT STATUS MIGRAINOSUS, NOT INTRACTABLE, UNSPECIFIED MIGRAINE TYPE: ICD-10-CM

## 2024-05-06 DIAGNOSIS — G89.29 CHRONIC BILATERAL LOW BACK PAIN WITHOUT SCIATICA: ICD-10-CM

## 2024-05-06 DIAGNOSIS — I10 ESSENTIAL HYPERTENSION: ICD-10-CM

## 2024-05-06 DIAGNOSIS — M79.7 FIBROMYALGIA: ICD-10-CM

## 2024-05-06 DIAGNOSIS — G40.909 SEIZURE DISORDER (HCC): Primary | ICD-10-CM

## 2024-05-06 DIAGNOSIS — M54.50 CHRONIC BILATERAL LOW BACK PAIN WITHOUT SCIATICA: ICD-10-CM

## 2024-05-06 PROCEDURE — 3074F SYST BP LT 130 MM HG: CPT | Performed by: FAMILY MEDICINE

## 2024-05-06 PROCEDURE — 1036F TOBACCO NON-USER: CPT | Performed by: FAMILY MEDICINE

## 2024-05-06 PROCEDURE — 99214 OFFICE O/P EST MOD 30 MIN: CPT | Performed by: FAMILY MEDICINE

## 2024-05-06 PROCEDURE — G8420 CALC BMI NORM PARAMETERS: HCPCS | Performed by: FAMILY MEDICINE

## 2024-05-06 PROCEDURE — 3079F DIAST BP 80-89 MM HG: CPT | Performed by: FAMILY MEDICINE

## 2024-05-06 PROCEDURE — G8427 DOCREV CUR MEDS BY ELIG CLIN: HCPCS | Performed by: FAMILY MEDICINE

## 2024-05-06 PROCEDURE — 3017F COLORECTAL CA SCREEN DOC REV: CPT | Performed by: FAMILY MEDICINE

## 2024-05-06 RX ORDER — GABAPENTIN 300 MG/1
600 CAPSULE ORAL 3 TIMES DAILY
Qty: 180 CAPSULE | Refills: 0 | Status: SHIPPED | OUTPATIENT
Start: 2024-05-06 | End: 2024-06-05

## 2024-05-06 ASSESSMENT — ENCOUNTER SYMPTOMS
COUGH: 0
WHEEZING: 0
SHORTNESS OF BREATH: 0
RHINORRHEA: 0
EYE DISCHARGE: 0
NAUSEA: 0
SORE THROAT: 0
DIARRHEA: 0
EYE REDNESS: 0
ABDOMINAL PAIN: 0
VOMITING: 0

## 2024-05-06 NOTE — PROGRESS NOTES
[facility]  [facility]  [address]  [place] [address]  Dept: [phone]    Devi Mckinney is a 58 y.o. female Established patient, who presents today for her medical conditions/complaints as noted below.      Chief Complaint   Patient presents with    Seizures     Pt was seen as a new pt with Dr. Feliz 5/3/2024       HPI:     HPI  Pt states had 3 seizures at work, and then 4 at [facility].  Was admitted .  No chest pain or sob.  Pt was off for a week due to the seizures.   Pt having extreme fatigue.  Pt states having cognitive functions.    Pt not able to see Dr. Mesa until July now.  Patient was at [facility] originally loaded with phenobarb as well as Keppra.  However her phenobarb dose was not changed.  Patient was not discharged on Keppra.    Patient with history of hypertension.  Stable at present.    Pt did not do stress test.  States chemical stress test makes her sick.  Pt states still having a lot of shortness and fatigue.    Pt going to have trial block for possible ablation this week with Dr. Mclcellan       Reviewed prior notes Neurology  Reviewed previous Labs and Imaging    No components found for: \"LDLCHOLESTEROL\", \"LDLCALC\"    (goal LDL is <100)   AST (U/L)   Date Value   2024 20     ALT (U/L)   Date Value   2024 20     BUN (mg/dL)   Date Value   2024 8     TSH (uIU/mL)   Date Value   2023 1.76     BP Readings from Last 3 Encounters:   24 120/80   24 (!) 141/95   24 (!) 144/95          (goal 120/80)    Past Medical History:   Diagnosis Date    Hx of psychiatric care     Hyperlipidemia     Hypertension     Migraines     Osteoarthritis     Osteoporosis     Seizures (HCC)     Suicide attempt (HCC)     Vertigo       Past Surgical History:   Procedure Laterality Date     SECTION      CHOLECYSTECTOMY, LAPAROSCOPIC      HIP ARTHROPLASTY Right 2022    TOTAL HIP ARTHROPLASTY  SMITH &

## 2024-05-13 PROBLEM — K21.9 GASTROESOPHAGEAL REFLUX DISEASE: Status: ACTIVE | Noted: 2024-05-13

## 2024-05-13 PROBLEM — G47.00 INSOMNIA: Status: ACTIVE | Noted: 2024-05-13

## 2024-05-13 ASSESSMENT — ENCOUNTER SYMPTOMS
ABDOMINAL DISTENTION: 1
BLOOD IN STOOL: 0
WHEEZING: 0
COLOR CHANGE: 0
CHEST TIGHTNESS: 0
COUGH: 0
CONSTIPATION: 0
SORE THROAT: 0
BACK PAIN: 1
PHOTOPHOBIA: 0
FACIAL SWELLING: 0

## 2024-05-13 NOTE — ASSESSMENT & PLAN NOTE
Patient is taking the Xanax for help with sleep onset insomnia.  She reports taking half a tablet up to a whole tablet 2 possibly 3 times a week so 30 tablets with 1 refill should last her at least 6 months 180 days.

## 2024-05-13 NOTE — ASSESSMENT & PLAN NOTE
The use of pain medication and muscle relaxants really has not been shown to be clearly effective with fibromyalgia.  The dose of Cymbalta that she is taking in addition to the Neurontin certainly should be more than enough in terms of medication.

## 2024-05-13 NOTE — ASSESSMENT & PLAN NOTE
Patient has adjusted her discharge instructions from review of chart currently taking phenobarbital 3 times a day and gabapentin 600 mg 3 times a day.  She is also not taking the same amount of Xanax was written in her discharge summary but only at bedtime as needed.  The liver because she is not taking.  Needs to follow-up with neurology with regards to her seizure history.  She is complaining of feeling tired and I suggest that she stop the Robaxin because of the drug drug interactions associated with it.

## 2024-05-13 NOTE — ASSESSMENT & PLAN NOTE
Findings on exam suggest some mild tenderness in the epigastric region.  It is possible you may be having some reflux triggering vasovagal episodes resulting in drop in blood pressure and/or pulse.  During her time in the hospital they did not comment on any issues associated with hypotension or arrhythmia.  Seizure-like motor activity can be seen with hypotensive episodes related to vagal stimulation.  Will start you on pantoprazole or Protonix 20 mg once a day make sure you are taking it.

## 2024-06-05 RX ORDER — GABAPENTIN 300 MG/1
600 CAPSULE ORAL 3 TIMES DAILY
Qty: 180 CAPSULE | Refills: 0 | Status: SHIPPED | OUTPATIENT
Start: 2024-06-05 | End: 2024-07-05

## 2024-07-03 RX ORDER — GABAPENTIN 300 MG/1
CAPSULE ORAL
Qty: 180 CAPSULE | Refills: 0 | Status: SHIPPED | OUTPATIENT
Start: 2024-07-03 | End: 2024-08-02

## 2024-07-03 NOTE — TELEPHONE ENCOUNTER
LAST VISIT:   5/6/2024     Future Appointments   Date Time Provider Department Center   7/23/2024 10:40 AM Josemanuel Mesa MD Neuro Spec Neurology -   8/6/2024  4:15 PM [facility]

## 2024-07-23 ENCOUNTER — OFFICE VISIT (OUTPATIENT)
Dept: NEUROLOGY | Age: 59
End: 2024-07-23
Payer: COMMERCIAL

## 2024-07-23 VITALS
BODY MASS INDEX: 22.63 KG/M2 | WEIGHT: 123 LBS | HEIGHT: 62 IN | SYSTOLIC BLOOD PRESSURE: 153 MMHG | HEART RATE: 71 BPM | DIASTOLIC BLOOD PRESSURE: 113 MMHG

## 2024-07-23 DIAGNOSIS — G40.909 SEIZURE DISORDER (HCC): Primary | ICD-10-CM

## 2024-07-23 DIAGNOSIS — G43.009 MIGRAINE WITHOUT AURA AND WITHOUT STATUS MIGRAINOSUS, NOT INTRACTABLE: Primary | ICD-10-CM

## 2024-07-23 DIAGNOSIS — G43.009 MIGRAINE WITHOUT AURA AND WITHOUT STATUS MIGRAINOSUS, NOT INTRACTABLE: ICD-10-CM

## 2024-07-23 PROCEDURE — 3017F COLORECTAL CA SCREEN DOC REV: CPT | Performed by: PSYCHIATRY & NEUROLOGY

## 2024-07-23 PROCEDURE — 99214 OFFICE O/P EST MOD 30 MIN: CPT | Performed by: PSYCHIATRY & NEUROLOGY

## 2024-07-23 PROCEDURE — 1036F TOBACCO NON-USER: CPT | Performed by: PSYCHIATRY & NEUROLOGY

## 2024-07-23 PROCEDURE — G8420 CALC BMI NORM PARAMETERS: HCPCS | Performed by: PSYCHIATRY & NEUROLOGY

## 2024-07-23 PROCEDURE — G8427 DOCREV CUR MEDS BY ELIG CLIN: HCPCS | Performed by: PSYCHIATRY & NEUROLOGY

## 2024-07-23 RX ORDER — ERENUMAB-AOOE 70 MG/ML
INJECTION SUBCUTANEOUS
Qty: 1 ADJUSTABLE DOSE PRE-FILLED PEN SYRINGE | Refills: 3 | Status: SHIPPED | OUTPATIENT
Start: 2024-07-23

## 2024-07-23 RX ORDER — PHENOBARBITAL 64.8 MG/1
TABLET ORAL
COMMUNITY
Start: 2024-07-09

## 2024-07-23 RX ORDER — LEVETIRACETAM 500 MG/1
500 TABLET ORAL 2 TIMES DAILY
Qty: 180 TABLET | Refills: 1 | Status: SHIPPED | OUTPATIENT
Start: 2024-07-23

## 2024-07-23 RX ORDER — LEVETIRACETAM 500 MG/1
500 TABLET ORAL 2 TIMES DAILY
Qty: 60 TABLET | Refills: 5 | Status: SHIPPED | OUTPATIENT
Start: 2024-07-23 | End: 2024-07-23

## 2024-07-23 RX ORDER — ERENUMAB-AOOE 70 MG/ML
70 INJECTION SUBCUTANEOUS
Qty: 1 ML | Refills: 0 | Status: SHIPPED | COMMUNITY
Start: 2024-07-23

## 2024-07-23 ASSESSMENT — ENCOUNTER SYMPTOMS
EYES NEGATIVE: 1
RESPIRATORY NEGATIVE: 1
ALLERGIC/IMMUNOLOGIC NEGATIVE: 1
GASTROINTESTINAL NEGATIVE: 1

## 2024-07-23 NOTE — TELEPHONE ENCOUNTER
Patient is requesting a 90 day supply of this medication for insurance purposes.    Pharmacy requesting refill of Keppra 500mg.      Medication active on med list yes      Date of last Rx: 7/23/2024 with 5 refills          verified by CHAS CONN      Date of last appointment 7/23/2024    Next Visit Date:  12/31/2024

## 2024-07-23 NOTE — PROGRESS NOTES
Active Problem patient was admitted to [facility] on April 19 for seizures .She has history of seizure disorder for 30 years . She reports that she has staring seizures , partial seizures with tonic flexion of right am and extension of right leg with alteration of consciousness followed by weakness along with grandmal seizures on phenobarbital 64. 8 mg po bid for 20 years . She has fibromyalgia neurontin 600 mg po tid . She has seen previously neurologists at [facility] and [facility] with concern whether some seizures were nonepileptiform . She will have seizures once to twice per month with staring followed by grandmal . She has been on depakote , topamax, dilantin and tegretol in the past. On April 19 she had a staring episode with unresponsiveness for 1 to 2 minutes at work without incontinence or tongue biting  . EMS reported similar episode in route to [facility]. She was then noted to have right facial droop and right-sided weakness . Stroke workup with CT head was unremarkable. 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 She was loaded with Keppra 20 mg/kg with total being 1152 mg. She had another witnessed seizure while at [facility] was given 2 mg of Ativan which aborted the seizure. Afterwards she had another witnessed seizure like event of staring and becoming unresponsive with flexion of her upper extremities and fingers. She was given another 2 mg of Ativan. She continued to be unresponsive for 3 to 4 minutes and was given a dose of 4 mg of Ativan. Seizure activity lasted for approximately 7 minutes. She was loaded with phenobarbital 15mg/kg totaling 754 mg. She was then transferred to [facility] for EEG monitoring for status epilepticus concern. Head CT normal . LTME mid diffuse slowing .She was discharged on phenobarbital 64.8 mg po bid .  She has headaches with migraine headaches 2 to 3 days out of he week over bilateral frontal head of stabbing component

## 2024-07-23 NOTE — TELEPHONE ENCOUNTER
Per provider, a sample of Aimovig 70mg/mL was given to the patient. Instructions were given to patient on how to self-inject, and this was demonstrated back by the patient. The medication label was completed, signed by provider, and attached to the medication.

## 2024-07-24 ENCOUNTER — TELEPHONE (OUTPATIENT)
Dept: NEUROLOGY | Age: 59
End: 2024-07-24

## 2024-07-24 ENCOUNTER — TELEPHONE (OUTPATIENT)
Dept: PRIMARY CARE CLINIC | Age: 59
End: 2024-07-24

## 2024-07-24 RX ORDER — LOSARTAN POTASSIUM 100 MG/1
100 TABLET ORAL DAILY
Qty: 90 TABLET | OUTPATIENT
Start: 2024-07-24

## 2024-07-24 RX ORDER — LOSARTAN POTASSIUM 100 MG/1
100 TABLET ORAL DAILY
Qty: 30 TABLET | Refills: 5 | Status: SHIPPED | OUTPATIENT
Start: 2024-07-24

## 2024-07-24 NOTE — TELEPHONE ENCOUNTER
Patient called stating she had a seizure on Monday morning. She has been having headaches, high blood pressure and been shaking. She did see a neurologist x1 day ago. She states blood pressure is still high 150/110 is average blood pressure. Patient would like to know what she can do with her sx and bp. She's been off of work x3 days.     Please advise.

## 2024-07-24 NOTE — TELEPHONE ENCOUNTER
Received a fax from pharmacy stating Aimovig needs PA. This was completed on CM.    PA approved through 10/22/2024.     Attempted to notify patient; a message was left with this information as well as the office phone number in the event there are further questions.

## 2024-07-26 NOTE — TELEPHONE ENCOUNTER
Called placed, spoke with patient who asked how often she can take them apart. I spoke with Stephanie Vela PA-C who stated no more than 4 hours between each pill. Patient notified and voice understanding.

## 2024-08-06 ENCOUNTER — OFFICE VISIT (OUTPATIENT)
Dept: PRIMARY CARE CLINIC | Age: 59
End: 2024-08-06
Payer: COMMERCIAL

## 2024-08-06 VITALS
OXYGEN SATURATION: 98 % | SYSTOLIC BLOOD PRESSURE: 144 MMHG | HEART RATE: 87 BPM | DIASTOLIC BLOOD PRESSURE: 86 MMHG | HEIGHT: 62 IN | WEIGHT: 138 LBS | BODY MASS INDEX: 25.4 KG/M2

## 2024-08-06 DIAGNOSIS — M13.0 POLYARTHRITIS: ICD-10-CM

## 2024-08-06 DIAGNOSIS — Z13.220 ENCOUNTER FOR LIPID SCREENING FOR CARDIOVASCULAR DISEASE: ICD-10-CM

## 2024-08-06 DIAGNOSIS — I10 ESSENTIAL HYPERTENSION: Primary | ICD-10-CM

## 2024-08-06 DIAGNOSIS — Z13.6 ENCOUNTER FOR LIPID SCREENING FOR CARDIOVASCULAR DISEASE: ICD-10-CM

## 2024-08-06 DIAGNOSIS — Z00.00 ANNUAL PHYSICAL EXAM: ICD-10-CM

## 2024-08-06 DIAGNOSIS — R53.83 FATIGUE, UNSPECIFIED TYPE: ICD-10-CM

## 2024-08-06 PROCEDURE — 3017F COLORECTAL CA SCREEN DOC REV: CPT | Performed by: FAMILY MEDICINE

## 2024-08-06 PROCEDURE — 3077F SYST BP >= 140 MM HG: CPT | Performed by: FAMILY MEDICINE

## 2024-08-06 PROCEDURE — 99214 OFFICE O/P EST MOD 30 MIN: CPT | Performed by: FAMILY MEDICINE

## 2024-08-06 PROCEDURE — 1036F TOBACCO NON-USER: CPT | Performed by: FAMILY MEDICINE

## 2024-08-06 PROCEDURE — G8427 DOCREV CUR MEDS BY ELIG CLIN: HCPCS | Performed by: FAMILY MEDICINE

## 2024-08-06 PROCEDURE — G8419 CALC BMI OUT NRM PARAM NOF/U: HCPCS | Performed by: FAMILY MEDICINE

## 2024-08-06 PROCEDURE — 3079F DIAST BP 80-89 MM HG: CPT | Performed by: FAMILY MEDICINE

## 2024-08-06 RX ORDER — DULOXETIN HYDROCHLORIDE 30 MG/1
30 CAPSULE, DELAYED RELEASE ORAL DAILY
Qty: 30 CAPSULE | Refills: 5 | Status: SHIPPED | OUTPATIENT
Start: 2024-08-06

## 2024-08-06 RX ORDER — METOPROLOL SUCCINATE 25 MG/1
25 TABLET, EXTENDED RELEASE ORAL DAILY
Qty: 30 TABLET | Refills: 5 | Status: SHIPPED | OUTPATIENT
Start: 2024-08-06

## 2024-08-06 ASSESSMENT — ENCOUNTER SYMPTOMS
COUGH: 0
ABDOMINAL PAIN: 0
WHEEZING: 0
VOMITING: 0
SHORTNESS OF BREATH: 0
RHINORRHEA: 0
EYE DISCHARGE: 0
EYE REDNESS: 0
SORE THROAT: 0
NAUSEA: 0
DIARRHEA: 0

## 2024-08-06 NOTE — PROGRESS NOTES
[facility]  [facility]  [address]  [place] [address]  Dept: [phone]    Devi Mckinney is a 58 y.o. female Established patient, who presents today for her medical conditions/complaints as noted below.      Chief Complaint   Patient presents with    Seizures     3 month f/u    Hypertension       HPI:     HPI  Patient states was diagnosed with seizures.  Was started on medication per neurology.  Patient states still having her fibromyalgia pain.  States is diffuse.  Medication has not been beneficial.  Denies any melena or hematochezia.  No lightheadedness or dizziness.  Patient states her mood has been more irritable lately.  Used to be on Cymbalta.  Patient stopped the medication possibly causing weight gain.  Patient is concerned because her weight continues to go up.    Reviewed prior notes None  Reviewed previous Labs    No components found for: \"LDLCHOLESTEROL\", \"LDLCALC\"    (goal LDL is <100)   AST (U/L)   Date Value   2024 20     ALT (U/L)   Date Value   2024 20     BUN (mg/dL)   Date Value   2024 8     TSH (uIU/mL)   Date Value   2023 1.76     BP Readings from Last 3 Encounters:   24 (!) 144/86   24 (!) 153/113   24 120/80          (goal 120/80)    Past Medical History:   Diagnosis Date    Hx of psychiatric care     Hyperlipidemia     Hypertension     Migraines     Osteoarthritis     Osteoporosis     Seizures (HCC)     Suicide attempt (HCC)     Vertigo       Past Surgical History:   Procedure Laterality Date     SECTION      CHOLECYSTECTOMY, LAPAROSCOPIC      HIP ARTHROPLASTY Right 2022    TOTAL HIP ARTHROPLASTY  BEACH & NEPHEW (Right )    HIP SURGERY Right 2022    TOTAL HIP ARTHROPLASTY  BEACH & NEPHEW performed by Rj Culp DO at [facility] OR    HYSTERECTOMY, TOTAL ABDOMINAL (CERVIX REMOVED)      MANDIBLE FRACTURE SURGERY      SHOULDER SURGERY Left     TOTAL KNEE ARTHROPLASTY Right

## 2024-08-07 RX ORDER — METOPROLOL SUCCINATE 25 MG/1
25 TABLET, EXTENDED RELEASE ORAL DAILY
Qty: 90 TABLET | OUTPATIENT
Start: 2024-08-07

## 2024-08-09 RX ORDER — GABAPENTIN 300 MG/1
CAPSULE ORAL
Qty: 180 CAPSULE | Refills: 0 | Status: SHIPPED | OUTPATIENT
Start: 2024-08-09 | End: 2024-09-08

## 2024-08-13 DIAGNOSIS — G47.00 INSOMNIA, UNSPECIFIED TYPE: ICD-10-CM

## 2024-08-14 RX ORDER — ALPRAZOLAM 1 MG/1
TABLET ORAL
Qty: 90 TABLET | Refills: 0 | Status: SHIPPED | OUTPATIENT
Start: 2024-08-14 | End: 2024-09-13

## 2024-08-14 NOTE — TELEPHONE ENCOUNTER
Last Visit:  8/6/2024     Next Visit Date:  Future Appointments   Date Time Provider Department Center   12/31/2024  4:20 PM Josemanuel Mesa MD Neuro Spec Neurology -

## 2024-09-17 DIAGNOSIS — G40.909 SEIZURE DISORDER (HCC): Primary | ICD-10-CM

## 2024-09-18 ENCOUNTER — TELEPHONE (OUTPATIENT)
Dept: NEUROLOGY | Age: 59
End: 2024-09-18

## 2024-09-18 RX ORDER — SUMATRIPTAN 100 MG/1
TABLET, FILM COATED ORAL
Qty: 30 TABLET | Refills: 0 | Status: SHIPPED | OUTPATIENT
Start: 2024-09-18

## 2024-09-18 RX ORDER — PHENOBARBITAL 64.8 MG/1
TABLET ORAL
Qty: 180 TABLET | Refills: 0 | Status: SHIPPED | OUTPATIENT
Start: 2024-09-18 | End: 2024-10-18

## 2024-09-19 RX ORDER — OXCARBAZEPINE 150 MG/1
TABLET, FILM COATED ORAL
Qty: 120 TABLET | Refills: 5 | Status: SHIPPED | OUTPATIENT
Start: 2024-09-19 | End: 2024-09-25 | Stop reason: SINTOL

## 2024-09-20 RX ORDER — OXCARBAZEPINE 150 MG/1
TABLET, FILM COATED ORAL
Qty: 327 TABLET | OUTPATIENT
Start: 2024-09-20

## 2024-10-23 RX ORDER — METHOCARBAMOL 750 MG/1
750 TABLET, FILM COATED ORAL 3 TIMES DAILY
Qty: 270 TABLET | Refills: 0 | Status: SHIPPED | OUTPATIENT
Start: 2024-10-23

## 2024-11-01 ENCOUNTER — TELEPHONE (OUTPATIENT)
Dept: NEUROLOGY | Age: 59
End: 2024-11-01

## 2024-11-01 NOTE — TELEPHONE ENCOUNTER
11 01 2024 12 31 2024 appointment was cancelled by the provider, unable to reach patient to reschedule, cancelled appointment and mailed letter. [place]

## 2024-11-22 DIAGNOSIS — G47.00 INSOMNIA, UNSPECIFIED TYPE: ICD-10-CM

## 2024-11-25 RX ORDER — ALPRAZOLAM 1 MG/1
TABLET ORAL
Qty: 90 TABLET | Refills: 0 | OUTPATIENT
Start: 2024-11-25 | End: 2024-12-25

## 2024-11-29 DIAGNOSIS — G47.00 INSOMNIA, UNSPECIFIED TYPE: ICD-10-CM

## 2024-11-29 RX ORDER — ALPRAZOLAM 1 MG/1
1 TABLET ORAL NIGHTLY PRN
Qty: 30 TABLET | Refills: 0 | Status: SHIPPED | OUTPATIENT
Start: 2024-11-29 | End: 2024-12-29

## 2024-12-12 ENCOUNTER — OFFICE VISIT (OUTPATIENT)
Dept: PRIMARY CARE CLINIC | Age: 59
End: 2024-12-12
Payer: COMMERCIAL

## 2024-12-12 VITALS
SYSTOLIC BLOOD PRESSURE: 146 MMHG | WEIGHT: 138 LBS | BODY MASS INDEX: 25.4 KG/M2 | HEART RATE: 75 BPM | DIASTOLIC BLOOD PRESSURE: 82 MMHG | OXYGEN SATURATION: 99 % | HEIGHT: 62 IN

## 2024-12-12 DIAGNOSIS — G40.909 SEIZURE DISORDER (HCC): ICD-10-CM

## 2024-12-12 DIAGNOSIS — I10 ESSENTIAL HYPERTENSION: Primary | ICD-10-CM

## 2024-12-12 DIAGNOSIS — Z12.31 ENCOUNTER FOR SCREENING MAMMOGRAM FOR MALIGNANT NEOPLASM OF BREAST: ICD-10-CM

## 2024-12-12 PROBLEM — S72.001A CLOSED FRACTURE OF NECK OF RIGHT FEMUR (HCC): Status: RESOLVED | Noted: 2022-02-14 | Resolved: 2024-12-12

## 2024-12-12 PROCEDURE — G8484 FLU IMMUNIZE NO ADMIN: HCPCS | Performed by: FAMILY MEDICINE

## 2024-12-12 PROCEDURE — 3017F COLORECTAL CA SCREEN DOC REV: CPT | Performed by: FAMILY MEDICINE

## 2024-12-12 PROCEDURE — 1036F TOBACCO NON-USER: CPT | Performed by: FAMILY MEDICINE

## 2024-12-12 PROCEDURE — 3077F SYST BP >= 140 MM HG: CPT | Performed by: FAMILY MEDICINE

## 2024-12-12 PROCEDURE — 3079F DIAST BP 80-89 MM HG: CPT | Performed by: FAMILY MEDICINE

## 2024-12-12 PROCEDURE — 99214 OFFICE O/P EST MOD 30 MIN: CPT | Performed by: FAMILY MEDICINE

## 2024-12-12 PROCEDURE — G8419 CALC BMI OUT NRM PARAM NOF/U: HCPCS | Performed by: FAMILY MEDICINE

## 2024-12-12 PROCEDURE — G8427 DOCREV CUR MEDS BY ELIG CLIN: HCPCS | Performed by: FAMILY MEDICINE

## 2024-12-12 RX ORDER — HYDRALAZINE HYDROCHLORIDE 10 MG/1
10 TABLET, FILM COATED ORAL 3 TIMES DAILY
Qty: 270 TABLET | Refills: 3 | Status: SHIPPED | OUTPATIENT
Start: 2024-12-12

## 2024-12-12 RX ORDER — PHENOBARBITAL 64.8 MG/1
64.8 TABLET ORAL 3 TIMES DAILY
Qty: 270 TABLET | Refills: 1 | Status: SHIPPED | OUTPATIENT
Start: 2024-12-12 | End: 2025-06-10

## 2024-12-12 ASSESSMENT — ENCOUNTER SYMPTOMS
SORE THROAT: 0
COUGH: 0
WHEEZING: 0
NAUSEA: 0
EYE REDNESS: 0
SHORTNESS OF BREATH: 0
VOMITING: 0
ABDOMINAL PAIN: 0
RHINORRHEA: 0
DIARRHEA: 0
EYE DISCHARGE: 0

## 2024-12-12 NOTE — PROGRESS NOTES
[facility]  [facility]  [address]  [place] [address]  Dept: [phone]    Devi Mckinney is a 59 y.o. female Established patient, who presents today for her medical conditions/complaints as noted below.      Chief Complaint   Patient presents with    Hypertension     3 month f/u        HPI:     HPI  Pt states having dizzy spells.  No chest pain or sob.  No fever or chills.   States working long hours.  Patient states was very araujo and irritable.  Her neurologist felt this was due to the Keppra and it was discontinued.  Patient states her last seizure was 2 months ago.  Requesting to increase her phenobarb to 3 a day.  Wants to get the medication from here instead of neurology.    Denies any chest heaviness or shortness of breath.    Patient with known history of hypertension.  Has not been checking her numbers outside the office.  Has no headaches.  Does have some lightheadedness but states this occurs even when she is just sitting or standing for prolonged periods.    Reviewed prior notes Neurology  Reviewed previous Labs    No components found for: \"LDLCHOLESTEROL\", \"LDLCALC\"    (goal LDL is <100)   AST (U/L)   Date Value   2024 20     ALT (U/L)   Date Value   2024 20     BUN (mg/dL)   Date Value   2024 8     TSH (uIU/mL)   Date Value   2023 1.76     BP Readings from Last 3 Encounters:   24 (!) 146/82   24 (!) 144/86   24 (!) 153/113          (goal 120/80)    Past Medical History:   Diagnosis Date    Hx of psychiatric care     Hyperlipidemia     Hypertension     Migraines     Osteoarthritis     Osteoporosis     Seizures (HCC)     Suicide attempt (HCC)     Vertigo       Past Surgical History:   Procedure Laterality Date     SECTION      CHOLECYSTECTOMY, LAPAROSCOPIC      HIP ARTHROPLASTY Right 2022    TOTAL HIP ARTHROPLASTY  BEACH & NEPHEW (Right )    HIP SURGERY Right 2022    TOTAL HIP

## 2024-12-26 ENCOUNTER — TELEPHONE (OUTPATIENT)
Dept: PRIMARY CARE CLINIC | Age: 59
End: 2024-12-26

## 2024-12-26 NOTE — TELEPHONE ENCOUNTER
Patient is requesting a letter to serve as a medical statement for the BM[facility] stating patient is cleared to drive with her seizure diagnosis. The letter should specify how long it is valid (between 1 and 4 years).    If agreed the letter will need to be printed and signed. Patient will  in office.

## 2024-12-30 ENCOUNTER — HOSPITAL ENCOUNTER (OUTPATIENT)
Dept: WOMENS IMAGING | Age: 59
Discharge: HOME | End: 2025-01-01
Payer: COMMERCIAL

## 2024-12-30 VITALS — BODY MASS INDEX: 25.4 KG/M2 | HEIGHT: 62 IN | WEIGHT: 138 LBS

## 2024-12-30 DIAGNOSIS — Z12.31 ENCOUNTER FOR SCREENING MAMMOGRAM FOR MALIGNANT NEOPLASM OF BREAST: ICD-10-CM

## 2024-12-30 PROCEDURE — 77063 BREAST TOMOSYNTHESIS BI: CPT

## 2025-01-03 DIAGNOSIS — G40.909 SEIZURE DISORDER (HCC): ICD-10-CM

## 2025-01-03 DIAGNOSIS — G47.00 INSOMNIA, UNSPECIFIED TYPE: ICD-10-CM

## 2025-01-03 RX ORDER — ALPRAZOLAM 1 MG/1
TABLET ORAL
Qty: 30 TABLET | Refills: 0 | Status: SHIPPED | OUTPATIENT
Start: 2025-01-03 | End: 2025-02-02

## 2025-01-03 RX ORDER — PHENOBARBITAL 64.8 MG/1
TABLET ORAL
Qty: 180 TABLET | Refills: 0 | Status: SHIPPED | OUTPATIENT
Start: 2025-01-03 | End: 2025-04-03

## 2025-01-28 ENCOUNTER — TELEPHONE (OUTPATIENT)
Dept: PRIMARY CARE CLINIC | Age: 60
End: 2025-01-28

## 2025-01-28 NOTE — TELEPHONE ENCOUNTER
Patient took the 1st hydralazine at 7am, did not check blood pressure before hand. Patient having high blood pressure still of 160/116 and pulse 72 and patient feels shakey. Asking how long until she is allowed to take the 2nd out 3 prn for the day.    Please Advise

## 2025-02-14 ENCOUNTER — TELEPHONE (OUTPATIENT)
Dept: PRIMARY CARE CLINIC | Age: 60
End: 2025-02-14

## 2025-02-14 DIAGNOSIS — G47.00 INSOMNIA, UNSPECIFIED TYPE: ICD-10-CM

## 2025-02-14 RX ORDER — AZITHROMYCIN 250 MG/1
TABLET, FILM COATED ORAL
Qty: 6 TABLET | Refills: 0 | Status: SHIPPED | OUTPATIENT
Start: 2025-02-14 | End: 2025-02-24

## 2025-02-14 NOTE — TELEPHONE ENCOUNTER
Advise pt sounds viral , could be the flu.  Zpak sent to pharmacy but if it is viral will not help

## 2025-02-14 NOTE — TELEPHONE ENCOUNTER
Patient c/o congestion, cough, nasal drainage. Sx started last Thursday. Patient declined appt and asked for a abx to be called in to pharmacy.    Pharmacy-[facility]

## 2025-02-17 RX ORDER — SUMATRIPTAN SUCCINATE 100 MG/1
TABLET ORAL
Qty: 30 TABLET | Refills: 0 | Status: SHIPPED | OUTPATIENT
Start: 2025-02-17

## 2025-02-17 RX ORDER — ALPRAZOLAM 1 MG/1
TABLET ORAL
Qty: 30 TABLET | Refills: 0 | Status: SHIPPED | OUTPATIENT
Start: 2025-02-17 | End: 2025-03-19

## 2025-02-21 ENCOUNTER — APPOINTMENT (OUTPATIENT)
Dept: GENERAL RADIOLOGY | Age: 60
DRG: 101 | End: 2025-02-21
Payer: COMMERCIAL

## 2025-02-21 ENCOUNTER — HOSPITAL ENCOUNTER (EMERGENCY)
Age: 60
Discharge: ANOTHER HOSPITAL | DRG: 101 | End: 2025-02-22
Attending: STUDENT IN AN ORGANIZED HEALTH CARE EDUCATION/TRAINING PROGRAM
Payer: COMMERCIAL

## 2025-02-21 ENCOUNTER — APPOINTMENT (OUTPATIENT)
Dept: CT IMAGING | Age: 60
DRG: 101 | End: 2025-02-21
Payer: COMMERCIAL

## 2025-02-21 DIAGNOSIS — G40.901 STATUS EPILEPTICUS, GENERALIZED CONVULSIVE (HCC): Primary | ICD-10-CM

## 2025-02-21 LAB
ALLEN TEST: POSITIVE
ANION GAP SERPL CALCULATED.3IONS-SCNC: 10 MMOL/L (ref 9–17)
B PARAP IS1001 DNA NPH QL NAA+NON-PROBE: NOT DETECTED
B PERT DNA SPEC QL NAA+PROBE: NOT DETECTED
BACTERIA URNS QL MICRO: ABNORMAL
BASOPHILS # BLD: 0 K/UL (ref 0–0.2)
BASOPHILS NFR BLD: 1 % (ref 0–2)
BILIRUB UR QL STRIP: NEGATIVE
BUN SERPL-MCNC: 15 MG/DL (ref 8–23)
C PNEUM DNA NPH QL NAA+NON-PROBE: NOT DETECTED
CALCIUM SERPL-MCNC: 8.7 MG/DL (ref 8.6–10.4)
CHLORIDE SERPL-SCNC: 109 MMOL/L (ref 98–107)
CLARITY UR: CLEAR
CO2 SERPL-SCNC: 24 MMOL/L (ref 20–31)
COLOR UR: YELLOW
CREAT SERPL-MCNC: 0.6 MG/DL (ref 0.5–0.9)
EOSINOPHIL # BLD: 0.1 K/UL (ref 0–0.4)
EOSINOPHILS RELATIVE PERCENT: 2 % (ref 1–4)
EPI CELLS #/AREA URNS HPF: ABNORMAL /HPF (ref 0–5)
ERYTHROCYTE [DISTWIDTH] IN BLOOD BY AUTOMATED COUNT: 13.9 % (ref 12.5–15.4)
FIO2: 100
FLUAV RNA NPH QL NAA+NON-PROBE: NOT DETECTED
FLUBV RNA NPH QL NAA+NON-PROBE: NOT DETECTED
GFR, ESTIMATED: >90 ML/MIN/1.73M2
GLUCOSE SERPL-MCNC: 104 MG/DL (ref 70–99)
GLUCOSE UR STRIP-MCNC: NEGATIVE MG/DL
HADV DNA NPH QL NAA+NON-PROBE: NOT DETECTED
HCOV 229E RNA NPH QL NAA+NON-PROBE: NOT DETECTED
HCOV HKU1 RNA NPH QL NAA+NON-PROBE: NOT DETECTED
HCOV NL63 RNA NPH QL NAA+NON-PROBE: NOT DETECTED
HCOV OC43 RNA NPH QL NAA+NON-PROBE: NOT DETECTED
HCT VFR BLD AUTO: 37.8 % (ref 36–46)
HGB BLD-MCNC: 12.5 G/DL (ref 12–16)
HGB UR QL STRIP.AUTO: NEGATIVE
HMPV RNA NPH QL NAA+NON-PROBE: NOT DETECTED
HPIV1 RNA NPH QL NAA+NON-PROBE: NOT DETECTED
HPIV2 RNA NPH QL NAA+NON-PROBE: NOT DETECTED
HPIV3 RNA NPH QL NAA+NON-PROBE: NOT DETECTED
HPIV4 RNA NPH QL NAA+NON-PROBE: NOT DETECTED
KETONES UR STRIP-MCNC: NEGATIVE MG/DL
LEUKOCYTE ESTERASE UR QL STRIP: NEGATIVE
LYMPHOCYTES NFR BLD: 2.5 K/UL (ref 1–4.8)
LYMPHOCYTES RELATIVE PERCENT: 41 % (ref 24–44)
M PNEUMO DNA NPH QL NAA+NON-PROBE: NOT DETECTED
MAGNESIUM SERPL-MCNC: 1.9 MG/DL (ref 1.6–2.6)
MCH RBC QN AUTO: 30.2 PG (ref 26–34)
MCHC RBC AUTO-ENTMCNC: 33 G/DL (ref 31–37)
MCV RBC AUTO: 91.3 FL (ref 80–100)
MODE: AC
MONOCYTES NFR BLD: 0.5 K/UL (ref 0.1–1.2)
MONOCYTES NFR BLD: 8 % (ref 2–11)
MUCOUS THREADS URNS QL MICRO: ABNORMAL
NEUTROPHILS NFR BLD: 48 % (ref 36–66)
NEUTS SEG NFR BLD: 2.9 K/UL (ref 1.8–7.7)
NITRITE UR QL STRIP: NEGATIVE
O2 DELIVERY DEVICE: ABNORMAL
PH UR STRIP: 6 [PH] (ref 5–8)
PLATELET # BLD AUTO: 259 K/UL (ref 140–450)
PMV BLD AUTO: 8.2 FL (ref 6–12)
POC HCO3: 28.4 MMOL/L (ref 21–28)
POC O2 SATURATION: 100 % (ref 94–98)
POC PCO2: 48.5 MM HG (ref 35–48)
POC PH: 7.38 (ref 7.35–7.45)
POC PO2: 490.3 MM HG (ref 83–108)
POSITIVE BASE EXCESS, ART: 2.4 MMOL/L (ref 0–3)
POTASSIUM SERPL-SCNC: 4.1 MMOL/L (ref 3.7–5.3)
PROT UR STRIP-MCNC: ABNORMAL MG/DL
RBC # BLD AUTO: 4.14 M/UL (ref 4–5.2)
RBC #/AREA URNS HPF: ABNORMAL /HPF (ref 0–2)
RSV RNA NPH QL NAA+NON-PROBE: NOT DETECTED
RV+EV RNA NPH QL NAA+NON-PROBE: NOT DETECTED
SAMPLE SITE: ABNORMAL
SARS-COV-2 RNA NPH QL NAA+NON-PROBE: NOT DETECTED
SODIUM SERPL-SCNC: 143 MMOL/L (ref 135–144)
SP GR UR STRIP: 1.04 (ref 1–1.03)
SPECIMEN DESCRIPTION: NORMAL
TROPONIN I SERPL HS-MCNC: <6 NG/L (ref 0–14)
TSH SERPL DL<=0.05 MIU/L-ACNC: 1.89 UIU/ML (ref 0.3–5)
UROBILINOGEN UR STRIP-ACNC: NORMAL EU/DL (ref 0–1)
WBC #/AREA URNS HPF: ABNORMAL /HPF (ref 0–5)
WBC OTHER # BLD: 6 K/UL (ref 3.5–11)

## 2025-02-21 PROCEDURE — 85025 COMPLETE CBC W/AUTO DIFF WBC: CPT

## 2025-02-21 PROCEDURE — 93005 ELECTROCARDIOGRAM TRACING: CPT

## 2025-02-21 PROCEDURE — 2580000003 HC RX 258

## 2025-02-21 PROCEDURE — 6360000002 HC RX W HCPCS

## 2025-02-21 PROCEDURE — 81001 URINALYSIS AUTO W/SCOPE: CPT

## 2025-02-21 PROCEDURE — 70450 CT HEAD/BRAIN W/O DYE: CPT

## 2025-02-21 PROCEDURE — 36415 COLL VENOUS BLD VENIPUNCTURE: CPT

## 2025-02-21 PROCEDURE — 84439 ASSAY OF FREE THYROXINE: CPT

## 2025-02-21 PROCEDURE — 2500000003 HC RX 250 WO HCPCS

## 2025-02-21 PROCEDURE — 0202U NFCT DS 22 TRGT SARS-COV-2: CPT

## 2025-02-21 PROCEDURE — 36600 WITHDRAWAL OF ARTERIAL BLOOD: CPT

## 2025-02-21 PROCEDURE — 94681 O2 UPTK CO2 OUTP % O2 XTRC: CPT

## 2025-02-21 PROCEDURE — 6360000002 HC RX W HCPCS: Performed by: STUDENT IN AN ORGANIZED HEALTH CARE EDUCATION/TRAINING PROGRAM

## 2025-02-21 PROCEDURE — 82803 BLOOD GASES ANY COMBINATION: CPT

## 2025-02-21 PROCEDURE — 84443 ASSAY THYROID STIM HORMONE: CPT

## 2025-02-21 PROCEDURE — 2700000000 HC OXYGEN THERAPY PER DAY

## 2025-02-21 PROCEDURE — 71045 X-RAY EXAM CHEST 1 VIEW: CPT

## 2025-02-21 PROCEDURE — 84484 ASSAY OF TROPONIN QUANT: CPT

## 2025-02-21 PROCEDURE — 83735 ASSAY OF MAGNESIUM: CPT

## 2025-02-21 PROCEDURE — 94761 N-INVAS EAR/PLS OXIMETRY MLT: CPT

## 2025-02-21 PROCEDURE — 84146 ASSAY OF PROLACTIN: CPT

## 2025-02-21 PROCEDURE — 80184 ASSAY OF PHENOBARBITAL: CPT

## 2025-02-21 PROCEDURE — 80048 BASIC METABOLIC PNL TOTAL CA: CPT

## 2025-02-21 RX ORDER — ETOMIDATE 2 MG/ML
INJECTION INTRAVENOUS DAILY PRN
Status: COMPLETED | OUTPATIENT
Start: 2025-02-21 | End: 2025-02-21

## 2025-02-21 RX ORDER — PROPOFOL 10 MG/ML
INJECTION, EMULSION INTRAVENOUS
Status: COMPLETED
Start: 2025-02-21 | End: 2025-02-21

## 2025-02-21 RX ORDER — MIDAZOLAM HYDROCHLORIDE 2 MG/2ML
2 INJECTION, SOLUTION INTRAMUSCULAR; INTRAVENOUS ONCE
Status: COMPLETED | OUTPATIENT
Start: 2025-02-21 | End: 2025-02-21

## 2025-02-21 RX ORDER — PHENOBARBITAL SODIUM 65 MG/ML
65 INJECTION, SOLUTION INTRAMUSCULAR; INTRAVENOUS ONCE
Status: COMPLETED | OUTPATIENT
Start: 2025-02-21 | End: 2025-02-21

## 2025-02-21 RX ORDER — MIDAZOLAM HYDROCHLORIDE 1 MG/ML
1-10 INJECTION, SOLUTION INTRAVENOUS CONTINUOUS
Status: DISCONTINUED | OUTPATIENT
Start: 2025-02-21 | End: 2025-02-22 | Stop reason: HOSPADM

## 2025-02-21 RX ORDER — MIDAZOLAM HYDROCHLORIDE 1 MG/ML
INJECTION, SOLUTION INTRAMUSCULAR; INTRAVENOUS
Status: COMPLETED
Start: 2025-02-21 | End: 2025-02-21

## 2025-02-21 RX ORDER — PROPOFOL 10 MG/ML
5-70 INJECTION, EMULSION INTRAVENOUS CONTINUOUS
Status: DISCONTINUED | OUTPATIENT
Start: 2025-02-21 | End: 2025-02-22 | Stop reason: HOSPADM

## 2025-02-21 RX ORDER — PROPOFOL 10 MG/ML
5-50 INJECTION, EMULSION INTRAVENOUS ONCE
Status: COMPLETED | OUTPATIENT
Start: 2025-02-21 | End: 2025-02-21

## 2025-02-21 RX ORDER — LORAZEPAM 2 MG/ML
2 INJECTION INTRAMUSCULAR ONCE
Status: DISCONTINUED | OUTPATIENT
Start: 2025-02-21 | End: 2025-02-22 | Stop reason: HOSPADM

## 2025-02-21 RX ORDER — SODIUM CHLORIDE, SODIUM LACTATE, POTASSIUM CHLORIDE, AND CALCIUM CHLORIDE .6; .31; .03; .02 G/100ML; G/100ML; G/100ML; G/100ML
1000 INJECTION, SOLUTION INTRAVENOUS ONCE
Status: COMPLETED | OUTPATIENT
Start: 2025-02-21 | End: 2025-02-21

## 2025-02-21 RX ORDER — SUCCINYLCHOLINE CHLORIDE 20 MG/ML
INJECTION INTRAMUSCULAR; INTRAVENOUS DAILY PRN
Status: COMPLETED | OUTPATIENT
Start: 2025-02-21 | End: 2025-02-21

## 2025-02-21 RX ORDER — MIDAZOLAM HYDROCHLORIDE 5 MG/ML
INJECTION INTRAMUSCULAR; INTRAVENOUS
Status: COMPLETED
Start: 2025-02-21 | End: 2025-02-21

## 2025-02-21 RX ORDER — NOREPINEPHRINE BITARTRATE 0.06 MG/ML
1-100 INJECTION, SOLUTION INTRAVENOUS CONTINUOUS
Status: DISCONTINUED | OUTPATIENT
Start: 2025-02-21 | End: 2025-02-22 | Stop reason: HOSPADM

## 2025-02-21 RX ADMIN — PROPOFOL 30 MCG/KG/MIN: 10 INJECTION, EMULSION INTRAVENOUS at 18:33

## 2025-02-21 RX ADMIN — MIDAZOLAM 5 MG: 5 INJECTION INTRAMUSCULAR; INTRAVENOUS at 18:18

## 2025-02-21 RX ADMIN — MIDAZOLAM HYDROCHLORIDE 2 MG: 2 INJECTION, SOLUTION INTRAMUSCULAR; INTRAVENOUS at 18:05

## 2025-02-21 RX ADMIN — MIDAZOLAM HYDROCHLORIDE 2 MG: 1 INJECTION, SOLUTION INTRAMUSCULAR; INTRAVENOUS at 18:05

## 2025-02-21 RX ADMIN — PROPOFOL INJECTABLE EMULSION 30 MCG/KG/MIN: 10 INJECTION, EMULSION INTRAVENOUS at 18:33

## 2025-02-21 RX ADMIN — ETOMIDATE 20 MG: 2 INJECTION INTRAVENOUS at 18:31

## 2025-02-21 RX ADMIN — SODIUM CHLORIDE, POTASSIUM CHLORIDE, SODIUM LACTATE AND CALCIUM CHLORIDE 1000 ML: 600; 310; 30; 20 INJECTION, SOLUTION INTRAVENOUS at 18:11

## 2025-02-21 RX ADMIN — PHENOBARBITAL SODIUM 65 MG: 65 INJECTION INTRAMUSCULAR; INTRAVENOUS at 18:27

## 2025-02-21 RX ADMIN — SUCCINYLCHOLINE CHLORIDE 100 MG: 20 INJECTION, SOLUTION INTRAMUSCULAR; INTRAVENOUS at 18:32

## 2025-02-21 RX ADMIN — PROPOFOL 70 MCG/KG/MIN: 10 INJECTION, EMULSION INTRAVENOUS at 21:17

## 2025-02-21 RX ADMIN — MIDAZOLAM HYDROCHLORIDE 2 MG/HR: 1 INJECTION, SOLUTION INTRAVENOUS at 18:50

## 2025-02-21 ASSESSMENT — PAIN SCALES - WONG BAKER: WONGBAKER_NUMERICALRESPONSE: NO HURT

## 2025-02-21 NOTE — ED NOTES
PT HAD 3RD WITNESSED SEIZURE, LASTING APPROX 3 MINUTES WITH O2 SATS DROPPING TO 50S WITH NRB, PREPARING FOR INTUABTION AT THIS TIME

## 2025-02-21 NOTE — ED PROVIDER NOTES
[facility] EMERGENCY DEPARTMENT  Emergency Department Encounter  Mid Level Provider     Pt Name: Devi Mckinney  MRN: [id number]  Birthdate 1965  Date of evaluation: 25  PCP:  Jose G Michel MD    CHIEF COMPLAINT       Chief Complaint   Patient presents with    Seizures       HISTORY OF PRESENT ILLNESS  (Location/Symptom, Timing/Onset,Context/Setting, Quality, Duration, Modifying Factors, Severity.)      Devi Mkcinney is a 59 y.o. female who presents with complaints of seizure activity noted by the  patient's coworkers.  She had approximately 1 minute seizure activity EMS was called upon their arrival she had another seizure event lasting approximately 1 minute then began asking for her .  Shortly after arrival had another seizure event while she was in the hallway moved immediately to room 5.    On chart review patient appears to be slowly on phenobarbital    PAST MEDICAL /SURGICAL / SOCIAL / FAMILY HISTORY      has a past medical history of Hx of psychiatric care, Hyperlipidemia, Hypertension, Migraines, Osteoarthritis, Osteoporosis, Seizures (HCC), Suicide attempt (HCC), and Vertigo.     has a past surgical history that includes shoulder surgery (Left, ); Mandible fracture surgery;  section; Hysterectomy, total abdominal; Total knee arthroplasty (Right, 2019); Total knee arthroplasty (Left, 2017); Cholecystectomy, laparoscopic (); Hip Arthroplasty (Right, 2022); and hip surgery (Right, 2022).    Social History     Socioeconomic History    Marital status:      Spouse name: Not on file    Number of children: Not on file    Years of education: Not on file    Highest education level: Not on file   Occupational History    Not on file   Tobacco Use    Smoking status: Never    Smokeless tobacco: Never   Vaping Use    Vaping status: Never Used   Substance and Sexual Activity    Alcohol use: Not Currently     Comment: Socially    Drug use:  2.90 1.8 - 7.7 k/uL    Lymphocytes Absolute 2.50 1.0 - 4.8 k/uL    Monocytes Absolute 0.50 0.1 - 1.2 k/uL    Eosinophils Absolute 0.10 0.0 - 0.4 k/uL    Basophils Absolute 0.00 0.0 - 0.2 k/uL   Magnesium   Result Value Ref Range    Magnesium 1.9 1.6 - 2.6 mg/dL       Consults:  Dr. Carroll- Neurocritical Care    Procedures:  Endotracheal intubation- See attending physicians documentation    CRITICAL CARE TIME     Due to the high probability of sudden and clinically significant deterioration in the patient's condition she required highest level of my preparedness to intervene urgently. I provided critical care time including documentation time, medication orders and management, reevaluation, vital sign assessment, ordering and reviewing of of lab tests ordering and reviewing of x-ray studies, and admission orders. Aggregate critical care time is 30 minutes including only time during which I was engaged in work directly related to her care and did not include time spent treating other patients simultaneously.     Re-Evaluation & Disposition:  See ED Course notes above.    The patient and/or family/guardian and I have discussed the diagnosis and risks, and we agree with admission to [facility]. The patient appears appropriate for admission. The patient and/or family/guardian understands that at this time, there is a medical necessity for admission. Routine admission counseling was given and the patient and/or family/guardian understands the rationale surrounding the decision for admission.     I have reviewed the disposition diagnosis with the patient and/or their family/guardian. I have answered their questions and made a plan for admission through shared decision making. They voiced understanding of these plans and did not have any further questions or concerns.     This patient was seen by the attending physician and they agreed with the assessment and plan.       FINAL IMPRESSION      1. Status epilepticus, generalized

## 2025-02-22 ENCOUNTER — HOSPITAL ENCOUNTER (INPATIENT)
Age: 60
LOS: 1 days | Discharge: HOME | DRG: 101 | End: 2025-02-23
Attending: PSYCHIATRY & NEUROLOGY | Admitting: PSYCHIATRY & NEUROLOGY
Payer: COMMERCIAL

## 2025-02-22 VITALS
HEART RATE: 66 BPM | DIASTOLIC BLOOD PRESSURE: 74 MMHG | WEIGHT: 139.99 LBS | TEMPERATURE: 97.5 F | BODY MASS INDEX: 25.6 KG/M2 | RESPIRATION RATE: 14 BRPM | SYSTOLIC BLOOD PRESSURE: 95 MMHG | OXYGEN SATURATION: 99 %

## 2025-02-22 DIAGNOSIS — G40.909 SEIZURE DISORDER (HCC): ICD-10-CM

## 2025-02-22 PROBLEM — G40.919 BREAKTHROUGH SEIZURE (HCC): Status: ACTIVE | Noted: 2025-02-22

## 2025-02-22 PROBLEM — G40.901 STATUS EPILEPTICUS (HCC): Status: ACTIVE | Noted: 2025-02-22

## 2025-02-22 LAB
ANION GAP SERPL CALCULATED.3IONS-SCNC: 8 MMOL/L (ref 9–16)
BUN SERPL-MCNC: 10 MG/DL (ref 8–23)
CALCIUM SERPL-MCNC: 8.6 MG/DL (ref 8.6–10.4)
CHLORIDE SERPL-SCNC: 112 MMOL/L (ref 98–107)
CO2 SERPL-SCNC: 23 MMOL/L (ref 20–31)
CREAT SERPL-MCNC: 0.4 MG/DL (ref 0.6–0.9)
GFR, ESTIMATED: >90 ML/MIN/1.73M2
GLUCOSE SERPL-MCNC: 96 MG/DL (ref 74–99)
MAGNESIUM SERPL-MCNC: 2 MG/DL (ref 1.6–2.4)
PHENOBARBITAL: 22.2 UG/ML (ref 10–30)
POTASSIUM SERPL-SCNC: 3.3 MMOL/L (ref 3.7–5.3)
PROLACTIN SERPL-MCNC: 55.4 NG/ML (ref 4.79–23.3)
SARS-COV-2 RDRP RESP QL NAA+PROBE: NOT DETECTED
SODIUM SERPL-SCNC: 143 MMOL/L (ref 136–145)
SPECIMEN DESCRIPTION: NORMAL
T4 FREE SERPL-MCNC: 1.1 NG/DL (ref 0.92–1.68)

## 2025-02-22 PROCEDURE — 95700 EEG CONT REC W/VID EEG TECH: CPT

## 2025-02-22 PROCEDURE — 2580000003 HC RX 258

## 2025-02-22 PROCEDURE — 6360000002 HC RX W HCPCS

## 2025-02-22 PROCEDURE — 82803 BLOOD GASES ANY COMBINATION: CPT

## 2025-02-22 PROCEDURE — 2500000003 HC RX 250 WO HCPCS

## 2025-02-22 PROCEDURE — 99223 1ST HOSP IP/OBS HIGH 75: CPT | Performed by: PSYCHIATRY & NEUROLOGY

## 2025-02-22 PROCEDURE — 0BH17EZ INSERTION OF ENDOTRACHEAL AIRWAY INTO TRACHEA, VIA NATURAL OR ARTIFICIAL OPENING: ICD-10-PCS | Performed by: PSYCHIATRY & NEUROLOGY

## 2025-02-22 PROCEDURE — 82947 ASSAY GLUCOSE BLOOD QUANT: CPT

## 2025-02-22 PROCEDURE — 2700000000 HC OXYGEN THERAPY PER DAY

## 2025-02-22 PROCEDURE — 95714 VEEG EA 12-26 HR UNMNTR: CPT

## 2025-02-22 PROCEDURE — 94761 N-INVAS EAR/PLS OXIMETRY MLT: CPT

## 2025-02-22 PROCEDURE — 6370000000 HC RX 637 (ALT 250 FOR IP)

## 2025-02-22 PROCEDURE — 2060000000 HC ICU INTERMEDIATE R&B

## 2025-02-22 PROCEDURE — 94002 VENT MGMT INPAT INIT DAY: CPT

## 2025-02-22 PROCEDURE — 80048 BASIC METABOLIC PNL TOTAL CA: CPT

## 2025-02-22 PROCEDURE — 87635 SARS-COV-2 COVID-19 AMP PRB: CPT

## 2025-02-22 PROCEDURE — 2000000000 HC ICU R&B

## 2025-02-22 PROCEDURE — 83735 ASSAY OF MAGNESIUM: CPT

## 2025-02-22 PROCEDURE — 36600 WITHDRAWAL OF ARTERIAL BLOOD: CPT

## 2025-02-22 PROCEDURE — 36415 COLL VENOUS BLD VENIPUNCTURE: CPT

## 2025-02-22 PROCEDURE — 5A1935Z RESPIRATORY VENTILATION, LESS THAN 24 CONSECUTIVE HOURS: ICD-10-PCS | Performed by: PSYCHIATRY & NEUROLOGY

## 2025-02-22 PROCEDURE — 99291 CRITICAL CARE FIRST HOUR: CPT | Performed by: PSYCHIATRY & NEUROLOGY

## 2025-02-22 RX ORDER — POTASSIUM CHLORIDE 29.8 MG/ML
20 INJECTION INTRAVENOUS PRN
Status: DISCONTINUED | OUTPATIENT
Start: 2025-02-22 | End: 2025-02-23 | Stop reason: HOSPADM

## 2025-02-22 RX ORDER — MIDAZOLAM HYDROCHLORIDE 2 MG/2ML
2 INJECTION, SOLUTION INTRAMUSCULAR; INTRAVENOUS ONCE
Status: COMPLETED | OUTPATIENT
Start: 2025-02-22 | End: 2025-02-22

## 2025-02-22 RX ORDER — PROPOFOL 10 MG/ML
5-50 INJECTION, EMULSION INTRAVENOUS CONTINUOUS
Status: DISCONTINUED | OUTPATIENT
Start: 2025-02-22 | End: 2025-02-22

## 2025-02-22 RX ORDER — ONDANSETRON 4 MG/1
4 TABLET, ORALLY DISINTEGRATING ORAL EVERY 8 HOURS PRN
Status: DISCONTINUED | OUTPATIENT
Start: 2025-02-22 | End: 2025-02-23 | Stop reason: HOSPADM

## 2025-02-22 RX ORDER — MAGNESIUM SULFATE IN WATER 40 MG/ML
2000 INJECTION, SOLUTION INTRAVENOUS PRN
Status: DISCONTINUED | OUTPATIENT
Start: 2025-02-22 | End: 2025-02-23 | Stop reason: HOSPADM

## 2025-02-22 RX ORDER — POLYETHYLENE GLYCOL 3350 17 G/17G
17 POWDER, FOR SOLUTION ORAL DAILY PRN
Status: DISCONTINUED | OUTPATIENT
Start: 2025-02-22 | End: 2025-02-23 | Stop reason: HOSPADM

## 2025-02-22 RX ORDER — SODIUM CHLORIDE 9 MG/ML
INJECTION, SOLUTION INTRAVENOUS PRN
Status: DISCONTINUED | OUTPATIENT
Start: 2025-02-22 | End: 2025-02-23 | Stop reason: HOSPADM

## 2025-02-22 RX ORDER — DULOXETIN HYDROCHLORIDE 30 MG/1
30 CAPSULE, DELAYED RELEASE ORAL DAILY
Status: DISCONTINUED | OUTPATIENT
Start: 2025-02-22 | End: 2025-02-23 | Stop reason: HOSPADM

## 2025-02-22 RX ORDER — ONDANSETRON 2 MG/ML
4 INJECTION INTRAMUSCULAR; INTRAVENOUS EVERY 6 HOURS PRN
Status: DISCONTINUED | OUTPATIENT
Start: 2025-02-22 | End: 2025-02-23 | Stop reason: HOSPADM

## 2025-02-22 RX ORDER — ACETAMINOPHEN 325 MG/1
650 TABLET ORAL EVERY 6 HOURS PRN
Status: DISCONTINUED | OUTPATIENT
Start: 2025-02-22 | End: 2025-02-23 | Stop reason: HOSPADM

## 2025-02-22 RX ORDER — POTASSIUM CHLORIDE 7.45 MG/ML
10 INJECTION INTRAVENOUS PRN
Status: DISCONTINUED | OUTPATIENT
Start: 2025-02-22 | End: 2025-02-23 | Stop reason: HOSPADM

## 2025-02-22 RX ORDER — MIDAZOLAM HYDROCHLORIDE 1 MG/ML
1-10 INJECTION, SOLUTION INTRAVENOUS CONTINUOUS
Status: DISCONTINUED | OUTPATIENT
Start: 2025-02-22 | End: 2025-02-22

## 2025-02-22 RX ORDER — METHOCARBAMOL 750 MG/1
750 TABLET, FILM COATED ORAL 3 TIMES DAILY
Status: DISCONTINUED | OUTPATIENT
Start: 2025-02-22 | End: 2025-02-23 | Stop reason: HOSPADM

## 2025-02-22 RX ORDER — SODIUM CHLORIDE 9 MG/ML
INJECTION, SOLUTION INTRAVENOUS CONTINUOUS
Status: DISCONTINUED | OUTPATIENT
Start: 2025-02-22 | End: 2025-02-23 | Stop reason: HOSPADM

## 2025-02-22 RX ORDER — ACETAMINOPHEN 650 MG/1
650 SUPPOSITORY RECTAL EVERY 6 HOURS PRN
Status: DISCONTINUED | OUTPATIENT
Start: 2025-02-22 | End: 2025-02-23 | Stop reason: HOSPADM

## 2025-02-22 RX ORDER — ENOXAPARIN SODIUM 100 MG/ML
40 INJECTION SUBCUTANEOUS DAILY
Status: DISCONTINUED | OUTPATIENT
Start: 2025-02-22 | End: 2025-02-23 | Stop reason: HOSPADM

## 2025-02-22 RX ORDER — PHENOBARBITAL 64.8 MG/1
64.8 TABLET ORAL 2 TIMES DAILY
Status: DISCONTINUED | OUTPATIENT
Start: 2025-02-22 | End: 2025-02-23 | Stop reason: HOSPADM

## 2025-02-22 RX ORDER — SODIUM CHLORIDE 0.9 % (FLUSH) 0.9 %
5-40 SYRINGE (ML) INJECTION PRN
Status: DISCONTINUED | OUTPATIENT
Start: 2025-02-22 | End: 2025-02-23 | Stop reason: HOSPADM

## 2025-02-22 RX ORDER — SODIUM CHLORIDE 0.9 % (FLUSH) 0.9 %
5-40 SYRINGE (ML) INJECTION EVERY 12 HOURS SCHEDULED
Status: DISCONTINUED | OUTPATIENT
Start: 2025-02-22 | End: 2025-02-23 | Stop reason: HOSPADM

## 2025-02-22 RX ORDER — GABAPENTIN 300 MG/1
300 CAPSULE ORAL 3 TIMES DAILY
Status: DISCONTINUED | OUTPATIENT
Start: 2025-02-22 | End: 2025-02-23 | Stop reason: HOSPADM

## 2025-02-22 RX ADMIN — SODIUM CHLORIDE, PRESERVATIVE FREE 10 ML: 5 INJECTION INTRAVENOUS at 08:32

## 2025-02-22 RX ADMIN — DULOXETINE HYDROCHLORIDE 30 MG: 30 CAPSULE, DELAYED RELEASE ORAL at 10:04

## 2025-02-22 RX ADMIN — PROPOFOL 70 MCG/KG/MIN: 10 INJECTION, EMULSION INTRAVENOUS at 01:45

## 2025-02-22 RX ADMIN — PROPOFOL 35 MCG/KG/MIN: 10 INJECTION, EMULSION INTRAVENOUS at 05:25

## 2025-02-22 RX ADMIN — GABAPENTIN 300 MG: 300 CAPSULE ORAL at 23:44

## 2025-02-22 RX ADMIN — SODIUM CHLORIDE: 0.9 INJECTION, SOLUTION INTRAVENOUS at 03:35

## 2025-02-22 RX ADMIN — METHOCARBAMOL 750 MG: 750 TABLET ORAL at 23:44

## 2025-02-22 RX ADMIN — Medication 2 MG/HR: at 05:12

## 2025-02-22 RX ADMIN — MIDAZOLAM HYDROCHLORIDE 2 MG: 1 INJECTION, SOLUTION INTRAMUSCULAR; INTRAVENOUS at 05:22

## 2025-02-22 RX ADMIN — SODIUM CHLORIDE, PRESERVATIVE FREE 10 ML: 5 INJECTION INTRAVENOUS at 20:18

## 2025-02-22 RX ADMIN — PHENOBARBITAL 64.8 MG: 64.8 TABLET ORAL at 20:18

## 2025-02-22 RX ADMIN — POTASSIUM BICARBONATE 40 MEQ: 782 TABLET, EFFERVESCENT ORAL at 10:04

## 2025-02-22 RX ADMIN — SODIUM CHLORIDE 40 MG: 9 INJECTION INTRAMUSCULAR; INTRAVENOUS; SUBCUTANEOUS at 08:29

## 2025-02-22 RX ADMIN — PHENOBARBITAL 64.8 MG: 64.8 TABLET ORAL at 10:04

## 2025-02-22 RX ADMIN — ACETAMINOPHEN 650 MG: 325 TABLET ORAL at 20:18

## 2025-02-22 ASSESSMENT — PULMONARY FUNCTION TESTS
PIF_VALUE: 14
PIF_VALUE: 14
PIF_VALUE: 15
PIF_VALUE: 15
PIF_VALUE: 18

## 2025-02-22 NOTE — PROGRESS NOTES
Spiritual Health History and Assessment/Progress Note  [facility]    Initial Encounter,  ,  ,      Name: Devi Mckinney MRN: [id number]    Age: 59 y.o.     Sex: female   Language: English   Confucianist: Oriental orthodox   Status epilepticus (HCC)     Date: 2/22/2025            Total Time Calculated: 20 min              Spiritual Assessment began in [facility] NEURO ICU        Referral/Consult From: Rounding   Encounter Overview/Reason: Initial Encounter  Service Provided For: Patient    Stephanie, Belief, Meaning:   Patient identifies as spiritual, is connected with a stephanie tradition or spiritual practice, and has beliefs or practices that help with coping during difficult times  Family/Friends No family/friends present      Importance and Influence:  Patient has spiritual/personal beliefs that influence decisions regarding their health and has no beliefs influential to healthcare decision-making identified during this visit  Family/Friends No family/friends present    Community:  Patient is connected with a spiritual community and feels well-supported. Support system includes: Children  Family/Friends No family/friends present    Assessment and Plan of Care:   Patient was awake and alert when  visited. Family was not present at the time. Patient remained hopeful and seemed to be doing well. Patient said she was raised Oriental orthodox. Patient received sacrament of anointing of the sick.  provided ministry of presence, offered support and prayed with patient. Patient expressed appreciation for the anointing and blessing she received.     Patient Interventions include: Facilitated expression of thoughts and feelings, Explored spiritual coping/struggle/distress, Affirmed coping skills/support systems, and Provided sacramental/Catholic ritual  Family/Friends Interventions include: No family/friends present    Patient Plan of Care: Spiritual Care available upon further referral  Family/Friends Plan of Care: No

## 2025-02-22 NOTE — H&P
Neuro ICU History & Physical    Patient Name: Devi Mckinney  Patient : 1965  Room/Bed: 0126/0126-01  Code Status: Full  Allergies:   Allergies   Allergen Reactions    Carbamazepine Nausea Only and Nausea And Vomiting    Metoclopramide Other (See Comments)     Seizures      Phenytoin Sodium Extended Other (See Comments)     \"Causes seizure\"    Topiramate Nausea Only and Nausea And Vomiting     unknown    Verapamil Nausea Only and Nausea And Vomiting    Sulfa Antibiotics Hives and Other (See Comments)    Sulfamethoxazole-Trimethoprim Hives    Amoxicillin     Phenytoin Other (See Comments)       CHIEF COMPLAINT     Seizure, Status Epilepticus  No chief complaint on file.      HPI    History Obtained From: Chart review    The patient is a 59 y.o. female presented to [facility] facility after seizing that occurred at work.    Patient has a history of seizure disorder, migraines, anxiety, fibromyalgia, vertigo.  Patient has seizure at work, as well as a seizure on arrival at [place] ED.  Patient on phenobarbital for seizures.  PN ES versus epileptic seizures.  In the emergency department, patient did desaturate during her seizures, multiple that were witnessed.  Diagnosis of status epilepticus.  Push dose of Versed with no  of seizures, patient was intubated at 23 at the teeth, patient was on propofol and Versed drips for seizure-like activity even after intubation.    Patient had 3 seizures total, 1 lasting approximately 1 minute, 1 lasting 2 minutes, 1 lasting 4 minutes.  Patient was intubated after this.  Was placed on Versed drip at that time.    Upon arrival, no seizure activity noted.  Peripheral drip restarted, LTM E ordered, on review of labs patient with phenobarbital level that was therapeutic, with previous levels none therapeutic.    Prolactin elevated, dirty urine sample, labs otherwise reassuring.  CT head showing no acute process.    Will continue to evaluate.  Patient will

## 2025-02-22 NOTE — PROCEDURES
PROCEDURE NOTE  Date: 2/23/2025   Name: Devi Mckinney  YOB: 1965    Procedures      LONG-TERM EEG-VIDEO MONITORING   CLINICAL NEUROPHYSIOLOGY LABORATORY  DEPARTMENT OF NEUROLOGY  [facility]     Patient: Devi Mckinney  Age: 59 y.o.  MRN: [id number]    Referring Physician: Chace Magallon, ZUHAIR Conner CNP  History: The patient is a 59 y.o. female who presented breakthrough seizure/encephalopathy. This long-term video-EEG monitoring study was performed to determine the nature of the patient's clinical events. The patient is on neuroactive medications.   Devi Mckinney   Current Facility-Administered Medications   Medication Dose Route Frequency Provider Last Rate Last Admin    sodium chloride flush 0.9 % injection 5-40 mL  5-40 mL IntraVENous 2 times per day Juan C Ren MD   10 mL at 02/23/25 0921    sodium chloride flush 0.9 % injection 5-40 mL  5-40 mL IntraVENous PRN Juan C Ren MD        0.9 % sodium chloride infusion   IntraVENous PRN Juan C Ren MD        potassium chloride 20 mEq/50 mL IVPB (Central Line)  20 mEq IntraVENous PRN Juan C Ren MD        Or    potassium chloride 10 mEq/100 mL IVPB (Peripheral Line)  10 mEq IntraVENous PRN Juan C Ren MD        magnesium sulfate 2000 mg in 50 mL IVPB premix  2,000 mg IntraVENous PRN Juan C Ren MD        enoxaparin (LOVENOX) injection 40 mg  40 mg SubCUTAneous Daily Juan C Ren MD        ondansetron (ZOFRAN-ODT) disintegrating tablet 4 mg  4 mg Oral Q8H PRN Juan C Ren MD        Or    ondansetron (ZOFRAN) injection 4 mg  4 mg IntraVENous Q6H PRN Juan C Ren MD        polyethylene glycol (GLYCOLAX) packet 17 g  17 g Oral Daily PRN Juan C Ren MD        acetaminophen (TYLENOL) tablet 650 mg  650 mg Oral Q6H PRN Juan C Ren MD   650 mg at 02/23/25 0700    Or    acetaminophen (TYLENOL) suppository 650 mg  650 mg Rectal Q6H PRN Juan C Ren MD        0.9 % sodium chloride

## 2025-02-22 NOTE — PROGRESS NOTES
Patient requesting children be called. Extubated this morning. Aox4. Pt visibly upset, verbalizing, \"Why am I here? I had a DNR. I was supposed to die yesterday.\" Pt verbalized daughter Kallie has POA paperwork.     Verbalized no plan to harm self. Denied not taking home meds on purpose.

## 2025-02-22 NOTE — ED PROVIDER NOTES
[facility] Emergency Department  [address]  [facility] [address]  Phone: [phone]  Fax: [phone]    Pt Name: Devi Mckinney  MRN: [id number]  Birthdate 1965  Date of evaluation: 25    Devi Mckinney is a 59 y.o. female with CC: Seizures      MDM:     59-year-old female with known history of seizure disorder, migraines, generalized anxiety, fibromyalgia, chronic vertigo.  Presents by EMS due to seizure that occurred at work, patient had a seizure upon arrival, did not return to baseline and then had another seizure.  On chart review previous admission shows that patient is on phenobarbital.  Previous admission at [facility] does say possible psychogenic nonepileptic seizures versus epileptic seizures.  Chart also says electroclinical seizures cannot be completely ruled out.  Patient has multiple witnessed seizures that are generalized tonic-clonic, she does have jaw clenching, and is unable to respond and does not return to baseline in between.  During the seizure she desaturates, received multiple doses of push dose of Versed with ou  of seizures.  Due to desaturation decision was made to intubate patient, 7.5 ET tube placed at 23 at the teeth, bilateral breath sounds, good color change.  She was given etomidate and succinylcholine.  Infectious workup, chest x-ray and CT head ordered.  Patient on propofol drip with Versed drip added on due to persistent seizure-like activity after intubation.  Plan for transfer to [facility] neuro ICU due to concern for status epilepticus.    Vitals:    25 2253 25 2254 25 2300 25 2315   BP: (!) 134/102  (!) 121/91 113/88   Pulse:  68 66 66   Resp:  15 14 14   Temp:       TempSrc:       SpO2:  100% 99% 100%   Weight:           ED Course as of 25   1805 Patient has now had 2 seizures since arrival to the emergency department the initial one lasted approximately 1 minute this 1  sulfamethoxazole-trimethoprim, amoxicillin, and phenytoin.  FAMILY HISTORY     She indicated that her mother is . She indicated that her father is . She indicated that her sister is . She indicated that her brother is . She indicated that her maternal grandmother is .     SOCIAL HISTORY       Social History     Tobacco Use    Smoking status: Never    Smokeless tobacco: Never   Vaping Use    Vaping status: Never Used   Substance Use Topics    Alcohol use: Not Currently     Comment: Socially    Drug use: Never       I performed a history and physical examination of the patient and discussed management with the mid level provideer. I reviewed the mid level provider's note and agree with the documented findings and plan of care. Any areas of disagreement are noted on the chart. I was personally present for the key portions of any procedures. I have documented in the chart those procedures where I was not present during the key portions. I have reviewed the emergency nurses triage note. I agree with the chief complaint, past medical history, past surgical history, allergies, medications, social and family history as documented unless otherwise noted below. Documentation of the HPI, Physical Exam and Medical Decision Making performed by mid level providers is based on my personal performance of the HPI, PE and MDM. For Physician Assistant/ Nurse Practitioner cases/documentation I have personally evaluated this patient and have completed at least one if not all key elements of the E/M (history, physical exam, and MDM). Additional findings are as noted.    Gerardo Lopez MD  Attending Emergency Physician        Gerardo Lopez MD  25 [phone]

## 2025-02-22 NOTE — H&P
[facility] Neurology   IN-PATIENT SERVICE   [facility]    HISTORY AND PHYSICAL EXAMINATION            Date:   2025  Patient name:  Devi Mckinney  Date of admission:  2025  2:10 AM  MRN:   [id number]  Account:  [id number]  YOB: 1965  PCP:    Jose G Michel MD  Room:   [address] [place] [address]  Code Status:    Full Code    Chief Complaint:     Seizures     History Obtained From:     patient, electronic medical record    History of Present Illness:     Per H&P  The patient is a 59 y.o. female presented to [facility] facility after seizing that occurred at work.     Patient has a history of seizure disorder, migraines, anxiety, fibromyalgia, vertigo.  Patient has seizure at work, as well as a seizure on arrival at [place] ED.  Patient on phenobarbital for seizures.  PN ES versus epileptic seizures.  In the emergency department, patient did desaturate during her seizures, multiple that were witnessed.  Diagnosis of status epilepticus.  Push dose of Versed with no  of seizures, patient was intubated at 23 at the teeth, patient was on propofol and Versed drips for seizure-like activity even after intubation.     Patient had 3 seizures total, 1 lasting approximately 1 minute, 1 lasting 2 minutes, 1 lasting 4 minutes.  Patient was intubated after this.  Was placed on Versed drip at that time.     Upon arrival, no seizure activity noted.  Peripheral drip restarted, LTM E ordered, on review of labs patient with phenobarbital level that was therapeutic, with previous levels none therapeutic.     Prolactin elevated, dirty urine sample, labs otherwise reassuring.  CT head showing no acute process.     Will continue to evaluate.  Patient will benefit from video EEG, continued support.    Patient was extubated today 25 at 7 am.       Past Medical History:     Past Medical History:   Diagnosis Date    Hx of psychiatric care     Hyperlipidemia     Hypertension     Migraines

## 2025-02-23 VITALS
TEMPERATURE: 98.1 F | OXYGEN SATURATION: 99 % | SYSTOLIC BLOOD PRESSURE: 121 MMHG | DIASTOLIC BLOOD PRESSURE: 71 MMHG | RESPIRATION RATE: 14 BRPM | HEART RATE: 72 BPM | BODY MASS INDEX: 25.72 KG/M2 | WEIGHT: 140.6 LBS

## 2025-02-23 PROBLEM — F33.1 MAJOR DEPRESSIVE DISORDER, RECURRENT, MODERATE (HCC): Status: ACTIVE | Noted: 2025-02-23

## 2025-02-23 LAB
ANION GAP SERPL CALCULATED.3IONS-SCNC: 11 MMOL/L (ref 9–16)
BASOPHILS # BLD: <0.03 K/UL (ref 0–0.2)
BASOPHILS NFR BLD: 0 % (ref 0–2)
BUN SERPL-MCNC: 5 MG/DL (ref 8–23)
CALCIUM SERPL-MCNC: 8.8 MG/DL (ref 8.6–10.4)
CHLORIDE SERPL-SCNC: 104 MMOL/L (ref 98–107)
CO2 SERPL-SCNC: 22 MMOL/L (ref 20–31)
CREAT SERPL-MCNC: 0.4 MG/DL (ref 0.6–0.9)
EKG ATRIAL RATE: 84 BPM
EKG P AXIS: 38 DEGREES
EKG P-R INTERVAL: 140 MS
EKG Q-T INTERVAL: 380 MS
EKG QRS DURATION: 78 MS
EKG QTC CALCULATION (BAZETT): 449 MS
EKG R AXIS: 6 DEGREES
EKG T AXIS: 26 DEGREES
EKG VENTRICULAR RATE: 84 BPM
EOSINOPHIL # BLD: 0.04 K/UL (ref 0–0.44)
EOSINOPHILS RELATIVE PERCENT: 1 % (ref 1–4)
ERYTHROCYTE [DISTWIDTH] IN BLOOD BY AUTOMATED COUNT: 12.8 % (ref 11.8–14.4)
GFR, ESTIMATED: >90 ML/MIN/1.73M2
GLUCOSE SERPL-MCNC: 102 MG/DL (ref 74–99)
HCT VFR BLD AUTO: 38.1 % (ref 36.3–47.1)
HGB BLD-MCNC: 12.2 G/DL (ref 11.9–15.1)
IMM GRANULOCYTES # BLD AUTO: <0.03 K/UL (ref 0–0.3)
IMM GRANULOCYTES NFR BLD: 0 %
LYMPHOCYTES NFR BLD: 1.13 K/UL (ref 1.1–3.7)
LYMPHOCYTES RELATIVE PERCENT: 24 % (ref 24–43)
MCH RBC QN AUTO: 29.6 PG (ref 25.2–33.5)
MCHC RBC AUTO-ENTMCNC: 32 G/DL (ref 28.4–34.8)
MCV RBC AUTO: 92.5 FL (ref 82.6–102.9)
MONOCYTES NFR BLD: 0.23 K/UL (ref 0.1–1.2)
MONOCYTES NFR BLD: 5 % (ref 3–12)
NEUTROPHILS NFR BLD: 70 % (ref 36–65)
NEUTS SEG NFR BLD: 3.28 K/UL (ref 1.5–8.1)
NRBC BLD-RTO: 0.4 PER 100 WBC
PLATELET # BLD AUTO: 216 K/UL (ref 138–453)
PMV BLD AUTO: 10.5 FL (ref 8.1–13.5)
POTASSIUM SERPL-SCNC: 3.7 MMOL/L (ref 3.7–5.3)
RBC # BLD AUTO: 4.12 M/UL (ref 3.95–5.11)
SODIUM SERPL-SCNC: 137 MMOL/L (ref 136–145)
WBC OTHER # BLD: 4.7 K/UL (ref 3.5–11.3)

## 2025-02-23 PROCEDURE — 85025 COMPLETE CBC W/AUTO DIFF WBC: CPT

## 2025-02-23 PROCEDURE — 97161 PT EVAL LOW COMPLEX 20 MIN: CPT

## 2025-02-23 PROCEDURE — 6370000000 HC RX 637 (ALT 250 FOR IP)

## 2025-02-23 PROCEDURE — 2500000003 HC RX 250 WO HCPCS

## 2025-02-23 PROCEDURE — 93010 ELECTROCARDIOGRAM REPORT: CPT | Performed by: INTERNAL MEDICINE

## 2025-02-23 PROCEDURE — 6360000002 HC RX W HCPCS

## 2025-02-23 PROCEDURE — 95714 VEEG EA 12-26 HR UNMNTR: CPT

## 2025-02-23 PROCEDURE — 80048 BASIC METABOLIC PNL TOTAL CA: CPT

## 2025-02-23 PROCEDURE — 99239 HOSP IP/OBS DSCHRG MGMT >30: CPT | Performed by: PSYCHIATRY & NEUROLOGY

## 2025-02-23 PROCEDURE — 95720 EEG PHY/QHP EA INCR W/VEEG: CPT | Performed by: PSYCHIATRY & NEUROLOGY

## 2025-02-23 PROCEDURE — 99253 IP/OBS CNSLTJ NEW/EST LOW 45: CPT

## 2025-02-23 PROCEDURE — 95718 EEG PHYS/QHP 2-12 HR W/VEEG: CPT | Performed by: PSYCHIATRY & NEUROLOGY

## 2025-02-23 PROCEDURE — 97116 GAIT TRAINING THERAPY: CPT

## 2025-02-23 PROCEDURE — 36415 COLL VENOUS BLD VENIPUNCTURE: CPT

## 2025-02-23 PROCEDURE — 2580000003 HC RX 258

## 2025-02-23 RX ORDER — PHENOBARBITAL 64.8 MG/1
64.8 TABLET ORAL 3 TIMES DAILY
Qty: 90 TABLET | Refills: 2 | Status: SHIPPED | OUTPATIENT
Start: 2025-02-23 | End: 2025-05-24

## 2025-02-23 RX ORDER — MAGNESIUM SULFATE IN WATER 40 MG/ML
2000 INJECTION, SOLUTION INTRAVENOUS ONCE
Status: COMPLETED | OUTPATIENT
Start: 2025-02-23 | End: 2025-02-23

## 2025-02-23 RX ORDER — KETOROLAC TROMETHAMINE 15 MG/ML
15 INJECTION, SOLUTION INTRAMUSCULAR; INTRAVENOUS ONCE
Status: COMPLETED | OUTPATIENT
Start: 2025-02-23 | End: 2025-02-23

## 2025-02-23 RX ORDER — DIPHENHYDRAMINE HYDROCHLORIDE 50 MG/ML
25 INJECTION INTRAMUSCULAR; INTRAVENOUS ONCE
Status: COMPLETED | OUTPATIENT
Start: 2025-02-23 | End: 2025-02-23

## 2025-02-23 RX ADMIN — GABAPENTIN 300 MG: 300 CAPSULE ORAL at 09:21

## 2025-02-23 RX ADMIN — ACETAMINOPHEN 650 MG: 325 TABLET ORAL at 07:00

## 2025-02-23 RX ADMIN — GABAPENTIN 300 MG: 300 CAPSULE ORAL at 13:33

## 2025-02-23 RX ADMIN — METHOCARBAMOL 750 MG: 750 TABLET ORAL at 13:33

## 2025-02-23 RX ADMIN — SODIUM CHLORIDE, PRESERVATIVE FREE 10 ML: 5 INJECTION INTRAVENOUS at 09:21

## 2025-02-23 RX ADMIN — PHENOBARBITAL 64.8 MG: 64.8 TABLET ORAL at 09:21

## 2025-02-23 RX ADMIN — METHOCARBAMOL 750 MG: 750 TABLET ORAL at 09:21

## 2025-02-23 RX ADMIN — KETOROLAC TROMETHAMINE 15 MG: 15 INJECTION, SOLUTION INTRAMUSCULAR; INTRAVENOUS at 13:37

## 2025-02-23 RX ADMIN — DULOXETINE HYDROCHLORIDE 30 MG: 30 CAPSULE, DELAYED RELEASE ORAL at 09:21

## 2025-02-23 RX ADMIN — DIPHENHYDRAMINE HYDROCHLORIDE 25 MG: 50 INJECTION, SOLUTION INTRAMUSCULAR; INTRAVENOUS at 13:37

## 2025-02-23 RX ADMIN — MAGNESIUM SULFATE HEPTAHYDRATE 2000 MG: 40 INJECTION, SOLUTION INTRAVENOUS at 13:34

## 2025-02-23 RX ADMIN — SODIUM CHLORIDE 40 MG: 9 INJECTION INTRAMUSCULAR; INTRAVENOUS; SUBCUTANEOUS at 09:21

## 2025-02-23 NOTE — PLAN OF CARE
Problem: Discharge Planning  Goal: Discharge to home or other facility with appropriate resources  2/23/2025 0608 by Maggie Fermin RN  Outcome: Progressing  2/22/2025 1851 by Thomas Ledesma RN  Outcome: Progressing     Problem: Pain  Goal: Verbalizes/displays adequate comfort level or baseline comfort level  2/23/2025 0608 by Maggie Fermin RN  Outcome: Progressing  2/22/2025 1851 by Thomas Ledesma, RN  Outcome: Progressing     Problem: Safety - Adult  Goal: Free from fall injury  2/23/2025 0608 by Maggie Fermin RN  Outcome: Progressing  2/22/2025 1851 by Thomas Ledesma, RN  Outcome: Progressing

## 2025-02-23 NOTE — PROGRESS NOTES
Physical Therapy  Facility/Department: [address] NEURO ICU   Physical Therapy Initial Evaluation    Patient Name: Devi Mckinney        MRN: [id number]    : 1965    Date of Service: 2025  CC: Had a seizure at work and in ED  Past Medical History:  has a past medical history of Hx of psychiatric care, Hyperlipidemia, Hypertension, Migraines, Osteoarthritis, Osteoporosis, Seizures (HCC), Suicide attempt (HCC), and Vertigo.  Past Surgical History:  has a past surgical history that includes shoulder surgery (Left, ); Mandible fracture surgery;  section; Hysterectomy, total abdominal; Total knee arthroplasty (Right, 2019); Total knee arthroplasty (Left, 2017); Cholecystectomy, laparoscopic (); Hip Arthroplasty (Right, 2022); and hip surgery (Right, 2022).    Discharge Recommendations  Discharge Recommendations: Home independently  PT Equipment Recommendations  Equipment Needed: No    Assessment     Assessment: No need for further PT either here or after discharge. Patient was able to ambulate 200' without device, negotiate stairs, stand on one leg for an adequate time to enable community ambulation. DC PT  Therapy Prognosis: Good  Decision Making: Low Complexity  Requires PT Follow-Up: No  Safety Devices  Type of Devices: All fall risk precautions in place, Call light within reach, Left in bed, Gait belt    AM-PAC  AM-PAC Basic Mobility - Inpatient   How much help is needed turning from your back to your side while in a flat bed without using bedrails?: None  How much help is needed moving from lying on your back to sitting on the side of a flat bed without using bedrails?: None  How much help is needed moving to and from a bed to a chair?: None  How much help is needed standing up from a chair using your arms?: None  How much help is needed walking in hospital room?: None  How much help is needed climbing 3-5 steps with a railing?: None  AM-PAC Inpatient Mobility Raw Score

## 2025-02-23 NOTE — CARE COORDINATION
Case Management Assessment  Initial Evaluation    Date/Time of Evaluation: 2/23/2025 10:53 AM  Assessment Completed by: HERNANDEZ GUADALUPE RN    If patient is discharged prior to next notation, then this note serves as note for discharge by case management.    Patient Name: Devi Mckinney                   YOB: 1965  Diagnosis: Status epilepticus (HCC) [G40.901]                   Date / Time: 2/22/2025  2:10 AM    Patient Admission Status: Inpatient   Readmission Risk (Low < 19, Mod (19-27), High > 27): Readmission Risk Score: 8.1    Current PCP: Jose G Michel MD  PCP verified by CM? (P) Yes    Chart Reviewed: Yes      History Provided by: (P) Patient  Patient Orientation: (P) Alert and Oriented    Patient Cognition: (P) Alert    Hospitalization in the last 30 days (Readmission):  No    If yes, Readmission Assessment in CM Navigator will be completed.    Advance Directives:      Code Status: Full Code   Patient's Primary Decision Maker is: (P) Legal Next of Kin      Discharge Planning:    Patient lives with: (P) Alone Type of Home: (P) House  Primary Care Giver: (P) Self  Patient Support Systems include: (P) Children   Current Financial resources: (P) None  Current community resources: (P) None  Current services prior to admission: (P) None            Current DME:              Type of Home Care services:  (P) None    ADLS  Prior functional level: (P) Independent in ADLs/IADLs  Current functional level: (P) Independent in ADLs/IADLs    PT AM-PAC:   /24  OT AM-PAC:   /24    Family can provide assistance at DC: (P) Yes  Would you like Case Management to discuss the discharge plan with any other family members/significant others, and if so, who? (P) No  Plans to Return to Present Housing: (P) Yes  Other Identified Issues/Barriers to RETURNING to current housing: none  Potential Assistance needed at discharge: (P) N/A            Potential DME:    Patient expects to discharge to: (P) House  Plan for

## 2025-02-23 NOTE — CONSULTS
DAY FOR HEADACHE 2/17/25  Yes Jose G Michel MD   hydrALAZINE (APRESOLINE) 10 MG tablet Take 1 tablet by mouth 3 times daily 12/12/24  Yes Jose G Michel MD   methocarbamol (ROBAXIN) 750 MG tablet TAKE 1 TABLET BY MOUTH THREE TIMES DAILY 10/23/24  Yes Jose G Michel MD   DULoxetine (CYMBALTA) 30 MG extended release capsule Take 1 capsule by mouth daily 8/6/24  Yes Jose G Michel MD   losartan (COZAAR) 100 MG tablet Take 1 tablet by mouth daily 7/24/24  Yes Jose G Michel MD   Erenumab-aooe (AIMOVIG) 70 MG/ML SOAJ Inject 70 mg into the skin every 30 days 7/23/24  Yes Josemanuel Mesa MD   azithromycin (ZITHROMAX) 250 MG tablet 500mg on day 1 followed by 250mg on days 2 - 5  Patient not taking: Reported on 2/22/2025 2/14/25 2/24/25  Jose G Michel MD   PHENobarbital 64.8 MG tablet TAKE 1 TABLET BY MOUTH TWICE DAILY. MAX DAILY AMOUNT: 129.6 MG  Patient taking differently: Take 1 tablet by mouth 3 times daily. 1/3/25 4/3/25  Jose G Michel MD   gabapentin (NEURONTIN) 300 MG capsule TAKE 2 CAPSULE BY MOUTH IN THE MORNING, AT NOON, AND AT BEDTIME FOR 30 DAYS  Patient taking differently: Take 1 capsule by mouth 3 times daily. 8/9/24 12/12/24  Jose G Michel MD   meclizine (ANTIVERT) 25 MG tablet Take 1 tablet by mouth 3 times daily as needed for Dizziness    Provider, MD Bing   ondansetron (ZOFRAN-ODT) 4 MG disintegrating tablet DISSOLVE 1 TABLET ON THE TONGUE THREE TIMES DAILY AS NEEDED FOR NAUSEA OR VOMITING 4/8/24   Jose G Michel MD        Medications:    Current Facility-Administered Medications: sodium chloride flush 0.9 % injection 5-40 mL, 5-40 mL, IntraVENous, 2 times per day  sodium chloride flush 0.9 % injection 5-40 mL, 5-40 mL, IntraVENous, PRN  0.9 % sodium chloride infusion, , IntraVENous, PRN  potassium chloride 20 mEq/50 mL IVPB (Central Line), 20 mEq, IntraVENous, PRN **OR** potassium chloride 10 mEq/100 mL IVPB (Peripheral Line), 10 mEq,

## 2025-02-23 NOTE — PROCEDURES
The interictal EEG was reviewed upto 11:55 pm  abnormal due to theta frequency suggestive of mild encephalopath

## 2025-02-23 NOTE — DISCHARGE SUMMARY
[facility]     Department of Neurology    INPATIENT DISCHARGE SUMMARY        Patient Identification:  Devi Mckinney is a 59 y.o. female.  :  1965  MRN: [id number]     Acct: [id number]   Admit Date:  2025  Discharge date and time: 2025  Attending Provider: Joe Corbett DO                                     Admission Diagnoses:   Status epilepticus (HCC) [G40.901]    Discharge Diagnoses:   Principal Problem:    Status epilepticus (HCC)  Active Problems:    Breakthrough seizure (HCC)    Major depressive disorder, recurrent, moderate (HCC)  Resolved Problems:    * No resolved hospital problems. *       Consults:   Psychiatry    Brief Inpatient course:    59 female history of seizure disorder, migraines, anxiety, fibromyalgia, vertigo. Patient has seizure at work, as well as a seizure on arrival at [place] ED. Patient on phenobarbital for seizures. PN ES versus epileptic seizures. In the emergency department, patient did desaturate during her seizures, multiple that were witnessed. Diagnosis of status epilepticus. Push dose of Versed with no  of seizures, patient was intubated at 23 at the teeth, patient was on propofol and Versed drips for seizure-like activity even after intubation.     Patient was transferred to neuro ICU post intubation.  Patient was extubated next morning.  Postextubation patient was maintained on home dose of phenobarbital.  Patient's phenobarbital level was within therapeutic range.  Patient was back to baseline.  EEG was done which showed theta frequency suggestive of mild encephalopathy.  No further episode of seizures.  Patient was then discharged with home dose of phenobarbital.  Patient needs to follow-up with Dr. Mesa outpatient.    Patient is stable from neurological standpoint to be discharged.    Procedures: None    Any Hospital Acquired Infections: none    Discharge Functional Status:  stable    Disposition: home    Patient

## 2025-02-24 ENCOUNTER — CARE COORDINATION (OUTPATIENT)
Dept: CARE COORDINATION | Age: 60
End: 2025-02-24

## 2025-02-24 LAB
ALLEN TEST: POSITIVE
FIO2: 100
GLUCOSE BLD-MCNC: 99 MG/DL (ref 74–100)
MODE: ABNORMAL
NEGATIVE BASE EXCESS, ART: 0.4 MMOL/L (ref 0–2)
O2 DELIVERY DEVICE: ABNORMAL
POC HCO3: 25 MMOL/L (ref 21–28)
POC O2 SATURATION: 100 % (ref 94–98)
POC PCO2: 43.6 MM HG (ref 35–48)
POC PH: 7.37 (ref 7.35–7.45)
POC PO2: 625.8 MM HG (ref 83–108)
SAMPLE SITE: ABNORMAL

## 2025-02-24 NOTE — PROCEDURES
PROCEDURE NOTE  Date: 2/23/2025   Name: Devi Mckinney  YOB: 1965    Procedures      LONG-TERM EEG-VIDEO MONITORING   CLINICAL NEUROPHYSIOLOGY LABORATORY  DEPARTMENT OF NEUROLOGY  [facility]     Patient: Devi Mckinney  Age: 59 y.o.  MRN: [id number]    Referring Physician: Chace Magallon APRN - CNP  History: The patient is a 59 y.o. female who presented breakthrough seizure/encephalopathy. This long-term video-EEG monitoring study was performed to determine the nature of the patient's clinical events. The patient is on neuroactive medications.   Devi Mckinney   No current facility-administered medications for this encounter.     Current Outpatient Medications   Medication Sig Dispense Refill    PHENobarbital 64.8 MG tablet Take 1 tablet by mouth 3 times daily for 90 days. Max Daily Amount: 194.4 mg 90 tablet 2    ALPRAZolam (XANAX) 1 MG tablet TAKE 1 TABLET BY MOUTH EVERY NIGHT AS NEEDED FOR SLEEP. MAX DAILY AMOUNT: 1 MG (Patient taking differently: Take 1 tablet by mouth nightly as needed for Sleep or Anxiety.) 30 tablet 0    SUMAtriptan (IMITREX) 100 MG tablet TAKE 1 TABLET BY MOUTH AS NEEDED FOR MIGRAINE,2 TABLETS PER DAY FOR HEADACHE 30 tablet 0    hydrALAZINE (APRESOLINE) 10 MG tablet Take 1 tablet by mouth 3 times daily 270 tablet 3    methocarbamol (ROBAXIN) 750 MG tablet TAKE 1 TABLET BY MOUTH THREE TIMES DAILY 270 tablet 0    DULoxetine (CYMBALTA) 30 MG extended release capsule Take 1 capsule by mouth daily 30 capsule 5    losartan (COZAAR) 100 MG tablet Take 1 tablet by mouth daily 30 tablet 5    Erenumab-aooe (AIMOVIG) 70 MG/ML SOAJ Inject 70 mg into the skin every 30 days 1 mL 0    azithromycin (ZITHROMAX) 250 MG tablet 500mg on day 1 followed by 250mg on days 2 - 5 (Patient not taking: Reported on 2/22/2025) 6 tablet 0    gabapentin (NEURONTIN) 300 MG capsule TAKE 2 CAPSULE BY MOUTH IN THE MORNING, AT NOON, AND AT BEDTIME FOR 30 DAYS (Patient taking differently: Take 1 capsule by mouth

## 2025-02-25 ENCOUNTER — CARE COORDINATION (OUTPATIENT)
Dept: CARE COORDINATION | Age: 60
End: 2025-02-25

## 2025-02-25 NOTE — CARE COORDINATION
Care Transitions Note    Initial Call - Call within 2 business days of discharge: Yes    Attempted to reach patient for transitions of care follow up. Unable to reach patient.    Outreach Attempts:   HIPAA compliant voicemail left for patient.     Patient: Devi Mckinney    Patient : 1965   MRN: [id number]    Reason for Admission: Status epilepticus   Discharge Date: 25  RURS: Readmission Risk Score: 8.5    Last Discharge Facility       Date Complaint Diagnosis Description Type Department Provider    25  Seizure disorder (HCC) Admission (Discharged) [facility] Joe Corbett, DO            Was this an external facility discharge? No    Follow Up Appointment:   Patient does not have a follow up appointment scheduled at time of call.  Routed to PCP for scheduling.      No further follow-up call indicated     Shanita Garza LPN

## 2025-03-03 ENCOUNTER — TELEPHONE (OUTPATIENT)
Dept: PRIMARY CARE CLINIC | Age: 60
End: 2025-03-03

## 2025-03-03 NOTE — TELEPHONE ENCOUNTER
Patient states she is very fatigued, drained, lungs hurt since her hospital stay and intubation. Patient asking for an appointment

## 2025-03-04 DIAGNOSIS — G40.909 SEIZURE DISORDER (HCC): Primary | ICD-10-CM

## 2025-03-04 RX ORDER — GABAPENTIN 300 MG/1
600 CAPSULE ORAL 3 TIMES DAILY
Qty: 180 CAPSULE | Refills: 0 | Status: SHIPPED | OUTPATIENT
Start: 2025-03-04 | End: 2025-04-03

## 2025-03-04 NOTE — TELEPHONE ENCOUNTER
Called patient and left voice mail asking patient to call office back. Okay to put patient in a provider fill spot if available.

## 2025-03-13 NOTE — TELEPHONE ENCOUNTER
LAST VISIT:   12/12/2024     No future appointments.    Pharmacy verified? Yes    [id number] - [place], [place] - [address] -  [phone] - F [phone]  [address] [address]  Phone: [phone] Fax: [phone]

## 2025-03-14 RX ORDER — HYDRALAZINE HYDROCHLORIDE 10 MG/1
TABLET, FILM COATED ORAL
Qty: 360 TABLET | Refills: 0 | Status: SHIPPED | OUTPATIENT
Start: 2025-03-14

## 2025-03-16 DIAGNOSIS — G47.00 INSOMNIA, UNSPECIFIED TYPE: ICD-10-CM

## 2025-03-17 RX ORDER — METHOCARBAMOL 750 MG/1
750 TABLET, FILM COATED ORAL 3 TIMES DAILY
Qty: 270 TABLET | Refills: 0 | Status: SHIPPED | OUTPATIENT
Start: 2025-03-17

## 2025-03-17 RX ORDER — ALPRAZOLAM 1 MG/1
TABLET ORAL
Qty: 30 TABLET | Refills: 0 | Status: SHIPPED | OUTPATIENT
Start: 2025-03-17 | End: 2025-04-16

## 2025-03-21 RX ORDER — LOSARTAN POTASSIUM 100 MG/1
100 TABLET ORAL DAILY
Qty: 30 TABLET | Refills: 5 | Status: SHIPPED | OUTPATIENT
Start: 2025-03-21

## 2025-03-27 ENCOUNTER — OFFICE VISIT (OUTPATIENT)
Dept: PRIMARY CARE CLINIC | Age: 60
End: 2025-03-27
Payer: COMMERCIAL

## 2025-03-27 ENCOUNTER — HOSPITAL ENCOUNTER (OUTPATIENT)
Age: 60
Setting detail: SPECIMEN
Discharge: HOME | End: 2025-03-27

## 2025-03-27 VITALS
HEIGHT: 62 IN | HEART RATE: 92 BPM | DIASTOLIC BLOOD PRESSURE: 96 MMHG | SYSTOLIC BLOOD PRESSURE: 142 MMHG | WEIGHT: 139 LBS | OXYGEN SATURATION: 98 % | BODY MASS INDEX: 25.58 KG/M2

## 2025-03-27 DIAGNOSIS — R82.998 LEUKOCYTES IN URINE: ICD-10-CM

## 2025-03-27 DIAGNOSIS — Z00.00 ANNUAL PHYSICAL EXAM: ICD-10-CM

## 2025-03-27 DIAGNOSIS — R68.83 CHILLS: ICD-10-CM

## 2025-03-27 DIAGNOSIS — R53.83 FATIGUE, UNSPECIFIED TYPE: ICD-10-CM

## 2025-03-27 DIAGNOSIS — R53.83 FATIGUE, UNSPECIFIED TYPE: Primary | ICD-10-CM

## 2025-03-27 DIAGNOSIS — G40.909 SEIZURE DISORDER (HCC): ICD-10-CM

## 2025-03-27 LAB
ALBUMIN/GLOBULIN RATIO: 1.7 (ref 1–2.5)
ALBUMIN: 3.9 G/DL (ref 3.5–5.2)
ALP BLD-CCNC: 101 U/L (ref 35–104)
ALT SERPL-CCNC: 17 U/L (ref 10–35)
ANION GAP SERPL CALCULATED.3IONS-SCNC: 8 MMOL/L (ref 9–16)
AST SERPL-CCNC: 18 U/L (ref 10–35)
BASOPHILS ABSOLUTE: 0.03 K/UL (ref 0–0.2)
BASOPHILS RELATIVE PERCENT: 1 % (ref 0–2)
BILIRUB SERPL-MCNC: 0.2 MG/DL (ref 0–1.2)
BILIRUBIN DIRECT: 0.1 MG/DL (ref 0–0.2)
BILIRUBIN, INDIRECT: 0.1 MG/DL (ref 0–1)
BILIRUBIN, POC: NORMAL
BLOOD URINE, POC: NORMAL
BUN BLDV-MCNC: 11 MG/DL (ref 8–23)
CALCIUM SERPL-MCNC: 9.6 MG/DL (ref 8.6–10.4)
CHLORIDE BLD-SCNC: 107 MMOL/L (ref 98–107)
CLARITY, POC: NORMAL
CO2: 27 MMOL/L (ref 20–31)
COLOR, POC: NORMAL
CREAT SERPL-MCNC: 0.5 MG/DL (ref 0.6–0.9)
EOSINOPHILS ABSOLUTE: 0.09 K/UL (ref 0–0.44)
EOSINOPHILS RELATIVE PERCENT: 3 % (ref 1–4)
GFR, ESTIMATED: >90 ML/MIN/1.73M2
GLOBULIN: 2.3 G/DL
GLUCOSE BLD-MCNC: 85 MG/DL (ref 74–99)
GLUCOSE URINE, POC: NORMAL MG/DL
HCT VFR BLD CALC: 43.3 % (ref 36.3–47.1)
HEMOGLOBIN: 13.6 G/DL (ref 11.9–15.1)
IMMATURE GRANULOCYTES %: 0 %
IMMATURE GRANULOCYTES ABSOLUTE: <0.03 K/UL (ref 0–0.3)
KETONES, POC: NORMAL MG/DL
LEUKOCYTE EST, POC: NORMAL
LYMPHOCYTES ABSOLUTE: 1.77 K/UL (ref 1.1–3.7)
LYMPHOCYTES RELATIVE PERCENT: 53 % (ref 24–43)
MCH RBC QN AUTO: 29.2 PG (ref 25.2–33.5)
MCHC RBC AUTO-ENTMCNC: 31.4 G/DL (ref 28.4–34.8)
MCV RBC AUTO: 92.9 FL (ref 82.6–102.9)
MONOCYTES ABSOLUTE: 0.19 K/UL (ref 0.1–1.2)
MONOCYTES RELATIVE PERCENT: 6 % (ref 3–12)
NEUTROPHILS ABSOLUTE: 1.21 K/UL (ref 1.5–8.1)
NEUTROPHILS RELATIVE PERCENT: 37 % (ref 36–65)
NITRITE, POC: NORMAL
NRBC AUTOMATED: 0 PER 100 WBC
PDW BLD-RTO: 13.7 % (ref 11.8–14.4)
PH, POC: 6
PHENOBARBITAL DATE LAST DOSE: NORMAL
PHENOBARBITAL DOSE AMOUNT: NORMAL
PHENOBARBITAL TIME LAST DOSE: NORMAL
PHENOBARBITAL: 24.9 UG/ML (ref 10–30)
PLATELET # BLD: 295 K/UL (ref 138–453)
PMV BLD AUTO: 10.2 FL (ref 8.1–13.5)
POTASSIUM SERPL-SCNC: 5.1 MMOL/L (ref 3.7–5.3)
PROTEIN, POC: NORMAL MG/DL
RBC # BLD: 4.66 M/UL (ref 3.95–5.11)
SODIUM BLD-SCNC: 142 MMOL/L (ref 136–145)
SPECIFIC GRAVITY, POC: 1.01
TOTAL PROTEIN: 6.2 G/DL (ref 6.6–8.7)
TSH SERPL DL<=0.05 MIU/L-ACNC: 1.8 UIU/ML (ref 0.27–4.2)
UROBILINOGEN, POC: 0.2 MG/DL
WBC # BLD: 3.3 K/UL (ref 3.5–11.3)

## 2025-03-27 PROCEDURE — G8419 CALC BMI OUT NRM PARAM NOF/U: HCPCS | Performed by: FAMILY MEDICINE

## 2025-03-27 PROCEDURE — 99214 OFFICE O/P EST MOD 30 MIN: CPT | Performed by: FAMILY MEDICINE

## 2025-03-27 PROCEDURE — G8427 DOCREV CUR MEDS BY ELIG CLIN: HCPCS | Performed by: FAMILY MEDICINE

## 2025-03-27 PROCEDURE — 1036F TOBACCO NON-USER: CPT | Performed by: FAMILY MEDICINE

## 2025-03-27 PROCEDURE — 3017F COLORECTAL CA SCREEN DOC REV: CPT | Performed by: FAMILY MEDICINE

## 2025-03-27 PROCEDURE — 81003 URINALYSIS AUTO W/O SCOPE: CPT | Performed by: FAMILY MEDICINE

## 2025-03-27 RX ORDER — HYDRALAZINE HYDROCHLORIDE 10 MG/1
10 TABLET, FILM COATED ORAL 4 TIMES DAILY
Qty: 360 TABLET | Refills: 5 | Status: SHIPPED | OUTPATIENT
Start: 2025-03-27

## 2025-03-27 RX ORDER — DULOXETIN HYDROCHLORIDE 30 MG/1
30 CAPSULE, DELAYED RELEASE ORAL DAILY
Qty: 30 CAPSULE | Refills: 5 | Status: SHIPPED | OUTPATIENT
Start: 2025-03-27

## 2025-03-27 RX ORDER — GABAPENTIN 300 MG/1
600 CAPSULE ORAL 3 TIMES DAILY
Qty: 180 CAPSULE | Refills: 5 | Status: SHIPPED | OUTPATIENT
Start: 2025-03-27 | End: 2025-09-23

## 2025-03-27 SDOH — ECONOMIC STABILITY: FOOD INSECURITY: WITHIN THE PAST 12 MONTHS, THE FOOD YOU BOUGHT JUST DIDN'T LAST AND YOU DIDN'T HAVE MONEY TO GET MORE.: NEVER TRUE

## 2025-03-27 SDOH — ECONOMIC STABILITY: FOOD INSECURITY: WITHIN THE PAST 12 MONTHS, YOU WORRIED THAT YOUR FOOD WOULD RUN OUT BEFORE YOU GOT MONEY TO BUY MORE.: NEVER TRUE

## 2025-03-27 ASSESSMENT — PATIENT HEALTH QUESTIONNAIRE - PHQ9
SUM OF ALL RESPONSES TO PHQ QUESTIONS 1-9: 20
7. TROUBLE CONCENTRATING ON THINGS, SUCH AS READING THE NEWSPAPER OR WATCHING TELEVISION: NEARLY EVERY DAY
SUM OF ALL RESPONSES TO PHQ QUESTIONS 1-9: 20
1. LITTLE INTEREST OR PLEASURE IN DOING THINGS: NEARLY EVERY DAY
10. IF YOU CHECKED OFF ANY PROBLEMS, HOW DIFFICULT HAVE THESE PROBLEMS MADE IT FOR YOU TO DO YOUR WORK, TAKE CARE OF THINGS AT HOME, OR GET ALONG WITH OTHER PEOPLE: EXTREMELY DIFFICULT
9. THOUGHTS THAT YOU WOULD BE BETTER OFF DEAD, OR OF HURTING YOURSELF: NOT AT ALL
6. FEELING BAD ABOUT YOURSELF - OR THAT YOU ARE A FAILURE OR HAVE LET YOURSELF OR YOUR FAMILY DOWN: SEVERAL DAYS
4. FEELING TIRED OR HAVING LITTLE ENERGY: NEARLY EVERY DAY
3. TROUBLE FALLING OR STAYING ASLEEP: NEARLY EVERY DAY
5. POOR APPETITE OR OVEREATING: NEARLY EVERY DAY
SUM OF ALL RESPONSES TO PHQ QUESTIONS 1-9: 20
SUM OF ALL RESPONSES TO PHQ QUESTIONS 1-9: 20
8. MOVING OR SPEAKING SO SLOWLY THAT OTHER PEOPLE COULD HAVE NOTICED. OR THE OPPOSITE, BEING SO FIGETY OR RESTLESS THAT YOU HAVE BEEN MOVING AROUND A LOT MORE THAN USUAL: MORE THAN HALF THE DAYS
2. FEELING DOWN, DEPRESSED OR HOPELESS: MORE THAN HALF THE DAYS

## 2025-03-27 ASSESSMENT — ENCOUNTER SYMPTOMS
ABDOMINAL PAIN: 0
WHEEZING: 0
SHORTNESS OF BREATH: 0
DIARRHEA: 0
EYE REDNESS: 0
COUGH: 0
NAUSEA: 0
VOMITING: 0
RHINORRHEA: 0
EYE DISCHARGE: 0
SORE THROAT: 0

## 2025-03-27 ASSESSMENT — COLUMBIA-SUICIDE SEVERITY RATING SCALE - C-SSRS
2. HAVE YOU ACTUALLY HAD ANY THOUGHTS OF KILLING YOURSELF?: NO
1. WITHIN THE PAST MONTH, HAVE YOU WISHED YOU WERE DEAD OR WISHED YOU COULD GO TO SLEEP AND NOT WAKE UP?: NO
6. HAVE YOU EVER DONE ANYTHING, STARTED TO DO ANYTHING, OR PREPARED TO DO ANYTHING TO END YOUR LIFE?: NO

## 2025-03-27 NOTE — PROGRESS NOTES
[facility]  [facility]  [address] [address]  Dept: [phone]    Devi Mckinney is a 59 y.o. female Established patient, who presents today for her medical conditions/complaints as noted below.      Chief Complaint   Patient presents with   • Fatigue     Patient states she feels fatigued, shaky. Patient said she felt like this since she was d/c from the Hospital in 2/23/25 but got worse on Monday       HPI:     History of Present Illness  The patient presents for evaluation of seizures, hypertension, anxiety, depression, and migraines.    She reports a lack of recovery following her recent hospitalization, which was precipitated by an episode of severe illness characterized by chest pain, excessive perspiration, and pronounced shakiness. Upon returning home, she discovered that her blood pressure had escalated to 170/110. She attributes this spike to a two-week lapse in her losartan medication due to a pharmacy error. She has since resumed her losartan regimen for the past two days. Despite monitoring her blood pressure, it remains elevated. Prior to this incident, her blood pressure was well-controlled with hydralazine and losartan, typically ranging from 131 to 136 over 90 to 97. She is considering changing her pharmacy. Hydralazine is taken three times a day.    A recent hospitalization was due to three significant seizures, during which she was intubated and placed on an unknown medication. The hospital stay lasted three days, during which EEG testing and physical therapy were conducted. Discharge occurred with her pre-existing medication regimen, despite discussions about potential changes. Gabapentin is taken occasionally, sometimes only once daily. An appointment with her neurologist has been difficult to secure, and she plans to attempt scheduling one today.    Severe chills, hot flashes, and feelings of malaise have been experienced, leading to

## 2025-03-28 ENCOUNTER — RESULTS FOLLOW-UP (OUTPATIENT)
Dept: PRIMARY CARE CLINIC | Age: 60
End: 2025-03-28

## 2025-03-28 LAB
MICROORGANISM SPEC CULT: NORMAL
SERVICE CMNT-IMP: NORMAL
SPECIMEN DESCRIPTION: NORMAL

## 2025-03-31 ENCOUNTER — RESULTS FOLLOW-UP (OUTPATIENT)
Dept: PRIMARY CARE CLINIC | Age: 60
End: 2025-03-31

## 2025-04-08 DIAGNOSIS — G47.00 INSOMNIA, UNSPECIFIED TYPE: ICD-10-CM

## 2025-04-09 RX ORDER — ALPRAZOLAM 1 MG/1
TABLET ORAL
Qty: 30 TABLET | Refills: 0 | Status: SHIPPED | OUTPATIENT
Start: 2025-04-09 | End: 2025-05-09

## 2025-04-11 RX ORDER — SUMATRIPTAN SUCCINATE 100 MG/1
TABLET ORAL
Qty: 30 TABLET | Refills: 0 | Status: SHIPPED | OUTPATIENT
Start: 2025-04-11

## 2025-04-15 RX ORDER — PANTOPRAZOLE SODIUM 20 MG/1
20 TABLET, DELAYED RELEASE ORAL DAILY
Qty: 30 TABLET | Refills: 0 | Status: SHIPPED | OUTPATIENT
Start: 2025-04-15

## 2025-05-12 NOTE — TELEPHONE ENCOUNTER
Called patient no answer. Patient voicemail is full unable to leave voicemail.    Will replete K run and oral potassium to be ordered as per PA. Will reach out to medicine and get back to RN As per provider Kimberly, Potassium runs ordered. Continue Antiobiotic Regimen

## 2025-05-28 DIAGNOSIS — G47.00 INSOMNIA, UNSPECIFIED TYPE: ICD-10-CM

## 2025-05-29 RX ORDER — ALPRAZOLAM 1 MG/1
1 TABLET ORAL NIGHTLY PRN
Qty: 30 TABLET | Refills: 0 | Status: SHIPPED | OUTPATIENT
Start: 2025-05-29 | End: 2025-06-28

## 2025-06-03 RX ORDER — PANTOPRAZOLE SODIUM 20 MG/1
20 TABLET, DELAYED RELEASE ORAL DAILY
Qty: 90 TABLET | Refills: 3 | Status: SHIPPED | OUTPATIENT
Start: 2025-06-03

## 2025-06-19 ENCOUNTER — OFFICE VISIT (OUTPATIENT)
Dept: PRIMARY CARE CLINIC | Age: 60
End: 2025-06-19
Payer: COMMERCIAL

## 2025-06-19 ENCOUNTER — HOSPITAL ENCOUNTER (OUTPATIENT)
Age: 60
Discharge: HOME | End: 2025-06-21
Payer: COMMERCIAL

## 2025-06-19 ENCOUNTER — HOSPITAL ENCOUNTER (OUTPATIENT)
Dept: GENERAL RADIOLOGY | Age: 60
Discharge: HOME | End: 2025-06-21
Payer: COMMERCIAL

## 2025-06-19 VITALS
BODY MASS INDEX: 25.42 KG/M2 | DIASTOLIC BLOOD PRESSURE: 76 MMHG | HEART RATE: 78 BPM | HEIGHT: 62 IN | SYSTOLIC BLOOD PRESSURE: 128 MMHG | OXYGEN SATURATION: 99 %

## 2025-06-19 DIAGNOSIS — R06.02 SHORTNESS OF BREATH: ICD-10-CM

## 2025-06-19 DIAGNOSIS — I10 ESSENTIAL HYPERTENSION: ICD-10-CM

## 2025-06-19 DIAGNOSIS — R05.1 ACUTE COUGH: ICD-10-CM

## 2025-06-19 DIAGNOSIS — G40.909 SEIZURE DISORDER (HCC): Primary | ICD-10-CM

## 2025-06-19 DIAGNOSIS — R53.1 WEAKNESS: ICD-10-CM

## 2025-06-19 PROBLEM — G40.919 BREAKTHROUGH SEIZURE (HCC): Status: RESOLVED | Noted: 2025-02-22 | Resolved: 2025-06-19

## 2025-06-19 LAB
ANION GAP SERPL CALCULATED.3IONS-SCNC: 12 MMOL/L (ref 9–16)
BASOPHILS ABSOLUTE: 0.05 K/UL (ref 0–0.2)
BASOPHILS RELATIVE PERCENT: 1 % (ref 0–2)
BUN BLDV-MCNC: 13 MG/DL (ref 8–23)
CALCIUM SERPL-MCNC: 9.7 MG/DL (ref 8.6–10.4)
CHLORIDE BLD-SCNC: 104 MMOL/L (ref 98–107)
CO2: 26 MMOL/L (ref 20–31)
CREAT SERPL-MCNC: 0.6 MG/DL (ref 0.6–0.9)
D-DIMER QUANTITATIVE: 0.81 UG/ML FEU (ref 0–0.57)
EOSINOPHILS ABSOLUTE: 0.23 K/UL (ref 0–0.44)
EOSINOPHILS RELATIVE PERCENT: 3 % (ref 1–4)
GFR, ESTIMATED: >90 ML/MIN/1.73M2
GLUCOSE BLD-MCNC: 90 MG/DL (ref 74–99)
HCT VFR BLD CALC: 40.5 % (ref 36.3–47.1)
HEMOGLOBIN: 13.1 G/DL (ref 11.9–15.1)
IMMATURE GRANULOCYTES %: 0 %
IMMATURE GRANULOCYTES ABSOLUTE: 0.03 K/UL (ref 0–0.3)
LYMPHOCYTES ABSOLUTE: 2.52 K/UL (ref 1.1–3.7)
LYMPHOCYTES RELATIVE PERCENT: 34 % (ref 24–43)
MCH RBC QN AUTO: 30.4 PG (ref 25.2–33.5)
MCHC RBC AUTO-ENTMCNC: 32.3 G/DL (ref 28.4–34.8)
MCV RBC AUTO: 94 FL (ref 82.6–102.9)
MONOCYTES ABSOLUTE: 0.73 K/UL (ref 0.1–1.2)
MONOCYTES RELATIVE PERCENT: 10 % (ref 3–12)
NEUTROPHILS ABSOLUTE: 3.81 K/UL (ref 1.5–8.1)
NEUTROPHILS RELATIVE PERCENT: 52 % (ref 36–65)
NRBC AUTOMATED: 0 PER 100 WBC
PDW BLD-RTO: 13.4 % (ref 11.8–14.4)
PLATELET # BLD: 364 K/UL (ref 138–453)
PMV BLD AUTO: 9.8 FL (ref 8.1–13.5)
POTASSIUM SERPL-SCNC: 5.3 MMOL/L (ref 3.7–5.3)
RBC # BLD: 4.31 M/UL (ref 3.95–5.11)
SODIUM BLD-SCNC: 142 MMOL/L (ref 136–145)
WBC # BLD: 7.4 K/UL (ref 3.5–11.3)

## 2025-06-19 PROCEDURE — 3078F DIAST BP <80 MM HG: CPT | Performed by: FAMILY MEDICINE

## 2025-06-19 PROCEDURE — 1036F TOBACCO NON-USER: CPT | Performed by: FAMILY MEDICINE

## 2025-06-19 PROCEDURE — 71046 X-RAY EXAM CHEST 2 VIEWS: CPT

## 2025-06-19 PROCEDURE — G8427 DOCREV CUR MEDS BY ELIG CLIN: HCPCS | Performed by: FAMILY MEDICINE

## 2025-06-19 PROCEDURE — 3074F SYST BP LT 130 MM HG: CPT | Performed by: FAMILY MEDICINE

## 2025-06-19 PROCEDURE — 99214 OFFICE O/P EST MOD 30 MIN: CPT | Performed by: FAMILY MEDICINE

## 2025-06-19 PROCEDURE — G8419 CALC BMI OUT NRM PARAM NOF/U: HCPCS | Performed by: FAMILY MEDICINE

## 2025-06-19 PROCEDURE — 3017F COLORECTAL CA SCREEN DOC REV: CPT | Performed by: FAMILY MEDICINE

## 2025-06-19 RX ORDER — LANOLIN ALCOHOL/MO/W.PET/CERES
1000 CREAM (GRAM) TOPICAL DAILY
COMMUNITY
Start: 2025-06-18

## 2025-06-19 RX ORDER — LANOLIN ALCOHOL/MO/W.PET/CERES
100 CREAM (GRAM) TOPICAL DAILY
COMMUNITY
Start: 2025-06-18

## 2025-06-19 RX ORDER — GUAIFENESIN 600 MG/1
1200 TABLET, EXTENDED RELEASE ORAL 2 TIMES DAILY
Qty: 40 TABLET | Refills: 0 | Status: SHIPPED | OUTPATIENT
Start: 2025-06-19 | End: 2025-06-29

## 2025-06-19 RX ORDER — ALBUTEROL SULFATE 90 UG/1
2 INHALANT RESPIRATORY (INHALATION) 4 TIMES DAILY PRN
Qty: 18 G | Refills: 0 | Status: SHIPPED | OUTPATIENT
Start: 2025-06-19

## 2025-06-19 RX ORDER — LACOSAMIDE 100 MG/1
100 TABLET ORAL 2 TIMES DAILY
COMMUNITY
Start: 2025-06-17

## 2025-06-19 RX ORDER — PHENOBARBITAL 64.8 MG/1
TABLET ORAL
COMMUNITY
Start: 2025-05-30

## 2025-06-19 RX ORDER — FOLIC ACID 1 MG/1
1 TABLET ORAL DAILY
COMMUNITY
Start: 2025-06-18

## 2025-06-19 ASSESSMENT — ENCOUNTER SYMPTOMS
DIARRHEA: 0
SORE THROAT: 0
VOMITING: 0
COUGH: 1
ABDOMINAL PAIN: 0
WHEEZING: 0
SHORTNESS OF BREATH: 1
EYE REDNESS: 0
RHINORRHEA: 0
EYE DISCHARGE: 0
NAUSEA: 0

## 2025-06-19 NOTE — PROGRESS NOTES
in for couple months.     Seizure disorder (HCC)  Shortness of breath  -     CBC with Auto Differential; Future  -     Basic Metabolic Panel; Future  -     D-Dimer, Quantitative; Future  -     XR CHEST STANDARD (2 VW); Future  -     albuterol sulfate HFA (VENTOLIN HFA) 108 (90 Base) MCG/ACT inhaler; Inhale 2 puffs into the lungs 4 times daily as needed for Wheezing, Disp-18 g, R-0Normal  Essential hypertension  Acute cough  -     guaiFENesin (MUCINEX) 600 MG extended release tablet; Take 2 tablets by mouth 2 times daily for 10 days, Disp-40 tablet, R-0Normal       The patient (or guardian, if applicable) and other individuals in attendance with the patient were advised that Artificial Intelligence will be utilized during this visit to record, process the conversation to generate a clinical note, and support improvement of the AI technology. The patient (or guardian, if applicable) and other individuals in attendance at the appointment consented to the use of AI, including the recording.      Patient given educational materials - see patient instructions.  Discussed use, benefit, and side effects of prescribed medications.  All patient questions answered. Pt voiced understanding. Reviewed health maintenance.  Instructed to continue current medications, diet and exercise.  Patient agreed with treatment plan. Follow up as directed.     Electronically signed by Jose G Michel MD on 6/19/2025 at 10:37 AM

## 2025-06-20 ENCOUNTER — RESULTS FOLLOW-UP (OUTPATIENT)
Dept: PRIMARY CARE CLINIC | Age: 60
End: 2025-06-20

## 2025-06-25 DIAGNOSIS — R06.02 SHORTNESS OF BREATH: ICD-10-CM

## 2025-06-25 RX ORDER — ALBUTEROL SULFATE 90 UG/1
INHALANT RESPIRATORY (INHALATION)
Qty: 6.7 G | OUTPATIENT
Start: 2025-06-25

## 2025-06-27 ENCOUNTER — HOSPITAL ENCOUNTER (OUTPATIENT)
Dept: PHYSICAL THERAPY | Facility: CLINIC | Age: 60
Setting detail: THERAPIES SERIES
Discharge: HOME | End: 2025-06-27

## 2025-06-27 NOTE — FLOWSHEET NOTE
[] [facility]  Outpatient Rehabilitation &  Therapy  [address]  [phone]  [phone] [] [facility]  Outpatient Rehabilitation &  Therapy  [address]  P: [phone]  F: [phone] [x] [facility]  Outpatient Rehabilitation &  Therapy  [address]  P: [phone]  F: [phone] [] [facility]  Outpatient Rehabilitation &  Therapy  [address]  [phone]  [phone] [] [facility]  Outpatient Rehabilitation &  Therapy  [address]   P: [phone]  F: [phone]  [] [facility]  Outpatient Rehabilitation &  Therapy  [address]  P: [phone]  F: [phone] [] [facility]  Outpatient Rehabilitation &  Therapy  [address]  P: [phone]  F: [phone] [] [facility]  Outpatient Rehabilitation &  Therapy  [address]  P: [phone]  F: [phone] [] [facility]  Outpatient Rehabilitation &  Therapy  [address]  P: [phone]  F: [phone]  [] [facility] Outpatient Rehabilitation &  Therapy  [address]  P: [phone]  F: [phone] [] [facility] Outpatient Rehabilitation &  Therapy  [address]  P: [phone]   F: [phone]      Therapy Cancel/No Show note    Date: 2025  Patient: Devi Mckinney  : 1965  MRN: [id number]    Cancels/No Shows to date: 1    For today's appointment patient:    [x]  Cancelled    [] Rescheduled appointment    [] No-show     Reason given by patient:    [x]  Patient ill    []  Conflicting appointment    [] No transportation      [] Conflict with work    [] No reason given    [] Weather related    [] COVID-19    [] Other:      Comments:  Per desk staff, \"...she felt like she was about to have a

## 2025-07-02 ENCOUNTER — HOSPITAL ENCOUNTER (OUTPATIENT)
Dept: PHYSICAL THERAPY | Facility: CLINIC | Age: 60
Setting detail: THERAPIES SERIES
Discharge: HOME | End: 2025-07-02

## 2025-07-02 ENCOUNTER — APPOINTMENT (OUTPATIENT)
Dept: CT IMAGING | Age: 60
End: 2025-07-02
Payer: COMMERCIAL

## 2025-07-02 ENCOUNTER — HOSPITAL ENCOUNTER (EMERGENCY)
Age: 60
Discharge: HOME | End: 2025-07-02
Attending: STUDENT IN AN ORGANIZED HEALTH CARE EDUCATION/TRAINING PROGRAM
Payer: COMMERCIAL

## 2025-07-02 VITALS
TEMPERATURE: 98.1 F | RESPIRATION RATE: 18 BRPM | SYSTOLIC BLOOD PRESSURE: 130 MMHG | DIASTOLIC BLOOD PRESSURE: 93 MMHG | HEIGHT: 62 IN | BODY MASS INDEX: 23.92 KG/M2 | OXYGEN SATURATION: 95 % | WEIGHT: 130 LBS | HEART RATE: 64 BPM

## 2025-07-02 DIAGNOSIS — R06.02 SOB (SHORTNESS OF BREATH): Primary | ICD-10-CM

## 2025-07-02 LAB
ANION GAP SERPL CALCULATED.3IONS-SCNC: 10 MMOL/L (ref 9–16)
BASOPHILS # BLD: 0 K/UL (ref 0–0.2)
BASOPHILS NFR BLD: 1 % (ref 0–2)
BUN SERPL-MCNC: 10 MG/DL (ref 8–23)
CALCIUM SERPL-MCNC: 9 MG/DL (ref 8.6–10.4)
CHLORIDE SERPL-SCNC: 108 MMOL/L (ref 98–107)
CO2 SERPL-SCNC: 23 MMOL/L (ref 20–31)
CREAT SERPL-MCNC: 0.7 MG/DL (ref 0.6–0.9)
EOSINOPHIL # BLD: 0.1 K/UL (ref 0–0.4)
EOSINOPHILS RELATIVE PERCENT: 2 % (ref 1–4)
ERYTHROCYTE [DISTWIDTH] IN BLOOD BY AUTOMATED COUNT: 14.5 % (ref 12.5–15.4)
GFR, ESTIMATED: >90 ML/MIN/1.73M2
GLUCOSE SERPL-MCNC: 82 MG/DL (ref 74–99)
HCT VFR BLD AUTO: 40.7 % (ref 36–46)
HGB BLD-MCNC: 13.7 G/DL (ref 12–16)
LYMPHOCYTES NFR BLD: 1.2 K/UL (ref 1–4.8)
LYMPHOCYTES RELATIVE PERCENT: 30 % (ref 24–44)
MCH RBC QN AUTO: 31.1 PG (ref 26–34)
MCHC RBC AUTO-ENTMCNC: 33.7 G/DL (ref 31–37)
MCV RBC AUTO: 92.4 FL (ref 80–100)
MONOCYTES NFR BLD: 0.4 K/UL (ref 0.1–1.2)
MONOCYTES NFR BLD: 9 % (ref 2–11)
NEUTROPHILS NFR BLD: 58 % (ref 36–66)
NEUTS SEG NFR BLD: 2.4 K/UL (ref 1.8–7.7)
PLATELET # BLD AUTO: 357 K/UL (ref 140–450)
PMV BLD AUTO: 7.5 FL (ref 6–12)
POTASSIUM SERPL-SCNC: 5 MMOL/L (ref 3.7–5.3)
RBC # BLD AUTO: 4.41 M/UL (ref 4–5.2)
SODIUM SERPL-SCNC: 141 MMOL/L (ref 136–145)
TROPONIN I SERPL HS-MCNC: <6 NG/L (ref 0–14)
WBC OTHER # BLD: 4 K/UL (ref 3.5–11)

## 2025-07-02 PROCEDURE — 84484 ASSAY OF TROPONIN QUANT: CPT

## 2025-07-02 PROCEDURE — 93005 ELECTROCARDIOGRAM TRACING: CPT

## 2025-07-02 PROCEDURE — 99285 EMERGENCY DEPT VISIT HI MDM: CPT

## 2025-07-02 PROCEDURE — 6360000004 HC RX CONTRAST MEDICATION: Performed by: STUDENT IN AN ORGANIZED HEALTH CARE EDUCATION/TRAINING PROGRAM

## 2025-07-02 PROCEDURE — 80048 BASIC METABOLIC PNL TOTAL CA: CPT

## 2025-07-02 PROCEDURE — 2500000003 HC RX 250 WO HCPCS: Performed by: STUDENT IN AN ORGANIZED HEALTH CARE EDUCATION/TRAINING PROGRAM

## 2025-07-02 PROCEDURE — 36415 COLL VENOUS BLD VENIPUNCTURE: CPT

## 2025-07-02 PROCEDURE — 71260 CT THORAX DX C+: CPT

## 2025-07-02 PROCEDURE — 85025 COMPLETE CBC W/AUTO DIFF WBC: CPT

## 2025-07-02 RX ORDER — IOPAMIDOL 755 MG/ML
75 INJECTION, SOLUTION INTRAVASCULAR
Status: COMPLETED | OUTPATIENT
Start: 2025-07-02 | End: 2025-07-02

## 2025-07-02 RX ORDER — SODIUM CHLORIDE 0.9 % (FLUSH) 0.9 %
10 SYRINGE (ML) INJECTION PRN
Status: DISCONTINUED | OUTPATIENT
Start: 2025-07-02 | End: 2025-07-02 | Stop reason: HOSPADM

## 2025-07-02 RX ORDER — 0.9 % SODIUM CHLORIDE 0.9 %
80 INTRAVENOUS SOLUTION INTRAVENOUS ONCE
Status: DISCONTINUED | OUTPATIENT
Start: 2025-07-02 | End: 2025-07-02 | Stop reason: HOSPADM

## 2025-07-02 RX ADMIN — IOPAMIDOL 75 ML: 755 INJECTION, SOLUTION INTRAVENOUS at 14:18

## 2025-07-02 RX ADMIN — Medication 80 ML: at 14:19

## 2025-07-02 RX ADMIN — SODIUM CHLORIDE, PRESERVATIVE FREE 10 ML: 5 INJECTION INTRAVENOUS at 14:18

## 2025-07-02 ASSESSMENT — PAIN SCALES - GENERAL: PAINLEVEL_OUTOF10: 1

## 2025-07-02 ASSESSMENT — ENCOUNTER SYMPTOMS
COUGH: 1
STRIDOR: 0
SHORTNESS OF BREATH: 1
NAUSEA: 0
CHEST TIGHTNESS: 1
VOMITING: 0
CHOKING: 0
WHEEZING: 0

## 2025-07-02 ASSESSMENT — PAIN - FUNCTIONAL ASSESSMENT: PAIN_FUNCTIONAL_ASSESSMENT: 0-10

## 2025-07-02 NOTE — ED PROVIDER NOTES
[facility] Emergency Department  [address]  [place] [address]  Phone: [phone]  Fax: [phone]    EMERGENCY DEPARTMENT ENCOUNTER          Pt Name: Devi Mckinney  MRN: [id number]  Birthdate 1965  Date of evaluation: 7/2/2025      CHIEF COMPLAINT       Chief Complaint   Patient presents with    Shortness of Breath     Pt was intubated in June, pt thinks she may have pneumonia now. Increase shortness of breath, elevated d-dimer.       HISTORY OF PRESENT ILLNESS       Devi Mckinney is a 59 y.o. female who presents with shortness of breath.  Patient had recent history of admission at [facility] in June for seizure activity and was intubated for 4 days.  2 days after discharge patient complained of having increased shortness of breath and fatigue.  Patient was at physical therapy today and was advised to go into the ED since she was looking ill and had taken increased D-dimer during her last labs.  She stated she had an aura last Friday but no seizure.  Patient also has had increased cough but no sputum production.  Patient denies fever chills or chest pain.    REVIEW OF SYSTEMS       Review of Systems   Constitutional:  Positive for fatigue. Negative for chills, diaphoresis and fever.   Respiratory:  Positive for cough, chest tightness and shortness of breath. Negative for choking, wheezing and stridor.    Cardiovascular:  Negative for chest pain, palpitations and leg swelling.   Gastrointestinal:  Negative for nausea and vomiting.   Musculoskeletal:  Negative for myalgias.   Neurological:  Positive for weakness. Negative for dizziness, seizures, syncope, facial asymmetry, light-headedness and headaches.   Psychiatric/Behavioral:  Negative for confusion. The patient is nervous/anxious.    All other systems reviewed and are negative.     PAST MEDICAL HISTORY    has a past medical history of Hx of psychiatric care, Hyperlipidemia, Hypertension, Migraines, Osteoarthritis,

## 2025-07-02 NOTE — ED PROVIDER NOTES
[facility] Emergency Department      Pt Name: Devi Mckinney  MRN: [id number]  Birthdate 1965  Date of evaluation: 7/2/2025    EMERGENCY DEPARTMENT ENCOUNTER      PERTINENT ATTENDING PHYSICIAN COMMENTS:      Attestation  I performed a history and physical examination of the patient/ or directly observed  and discussed management with the resident. I reviewed the resident’s note and agree with the documented findings and plan of care. Any areas of disagreement are noted on the chart. I was personally present for the key portions of any procedures. I have documented in the chart those procedures where I was not present during the key portions. I have reviewed the emergency nurses triage note. I agree with the chief complaint, past medical history, past surgical history, allergies, medications, social and family history as documented unless otherwise noted below.    For Residents/Physician Assistant/ Nurse Practitioner cases/documentation I have personally evaluated this patient and have completed at least one if not all key elements of the E/M (history, physical exam, and MDM). Additional findings are as noted.    CHIEF COMPLAINT       Chief Complaint   Patient presents with    Shortness of Breath     Pt was intubated in June, pt thinks she may have pneumonia now. Increase shortness of breath, elevated d-dimer.       HISTORY OF PRESENT ILLNESS    Devi Mckinney is a 59 y.o. female who presents to the emergency department due to increased shortness of breath, intubated in June due to seizures and is concern for pneumonia versus pulmonary embolism as she had outpatient labs done which showed an elevated D-dimer.  She is not on anticoagulation.  Denies any leg swelling, recent immobilization or travel other than the intubation and hospitalization.    PAST MEDICAL HISTORY    has a past medical history of Hx of psychiatric care, Hyperlipidemia, Hypertension, Migraines, Osteoarthritis, Osteoporosis, Seizures

## 2025-07-02 NOTE — FLOWSHEET NOTE
[] [facility]  Outpatient Rehabilitation &  Therapy  [address]  [phone]  [phone] [] [facility]  Outpatient Rehabilitation &  Therapy  [address]  P: [phone]  F: [phone] [x] [facility]  Outpatient Rehabilitation &  Therapy  [address]  P: [phone]  F: [phone] [] [facility]  Outpatient Rehabilitation &  Therapy  [address]  [phone]  [phone] [] [facility]  Outpatient Rehabilitation &  Therapy  [address]   P: [phone]  F: [phone]  [] [facility]  Outpatient Rehabilitation &  Therapy  [address]  P: [phone]  F: [phone] [] [facility]  Outpatient Rehabilitation &  Therapy  [address]  P: [phone]  F: [phone] [] [facility]  Outpatient Rehabilitation &  Therapy  [address]  P: [phone]  F: [phone] [] [facility]  Outpatient Rehabilitation &  Therapy  [address]  P: [phone]  F: [phone]  [] [facility] Outpatient Rehabilitation &  Therapy  [address]  P: [phone]  F: [phone] [] [facility] Outpatient Rehabilitation &  Therapy  [address]  P: [phone]   F: [phone]      Therapy Cancel/No Show note    Date: 2025  Patient: Devi Mckinney  : 1965  MRN: [id number]    Cancels/No Shows to date: 1    For today's appointment patient:    [x]  Cancelled by staff    [] Rescheduled appointment    [] No-show     Reason given by patient:    []  Patient ill    []  Conflicting appointment    [] No transportation      [] Conflict with work    [] No reason given    [] Weather related    [] COVID-19    [x] Other:      Comments:  Patient arrived for her evaluation.  She states

## 2025-07-02 NOTE — DISCHARGE INSTRUCTIONS
Date of admission: 7/2/2025  Date of discharge: 07/02/25    Your care was provided by the attending and resident physicians of the [facility]. The encounter diagnosis was SOB (shortness of breath).    FOLLOW-UP  - Follow up with your primary care physician Jose G Michel in 3 days.    MEDICATIONS  - Continue taking your other home medications as directed.    ADDITIONAL INSTRUCTIONS  - Talk to your doctor with follow on regarding fatigue and labs to check, vitamins, TSH, and iron.   - Please return immediately to the [facility] Emergency Department if you experience chest sabra, trouble breathing, profuse sweating, or any other concerning symptoms.

## 2025-07-03 LAB
EKG ATRIAL RATE: 66 BPM
EKG P AXIS: 21 DEGREES
EKG P-R INTERVAL: 130 MS
EKG Q-T INTERVAL: 382 MS
EKG QRS DURATION: 80 MS
EKG QTC CALCULATION (BAZETT): 400 MS
EKG R AXIS: 16 DEGREES
EKG T AXIS: 43 DEGREES
EKG VENTRICULAR RATE: 66 BPM

## 2025-07-03 PROCEDURE — 93010 ELECTROCARDIOGRAM REPORT: CPT | Performed by: INTERNAL MEDICINE

## 2025-07-08 DIAGNOSIS — G47.00 INSOMNIA, UNSPECIFIED TYPE: ICD-10-CM

## 2025-07-08 RX ORDER — ALPRAZOLAM 1 MG/1
TABLET ORAL
Qty: 30 TABLET | Refills: 1 | Status: SHIPPED | OUTPATIENT
Start: 2025-07-08 | End: 2025-08-07

## 2025-07-09 ENCOUNTER — TELEPHONE (OUTPATIENT)
Dept: PRIMARY CARE CLINIC | Age: 60
End: 2025-07-09

## 2025-07-09 RX ORDER — DULOXETIN HYDROCHLORIDE 30 MG/1
30 CAPSULE, DELAYED RELEASE ORAL 2 TIMES DAILY
Qty: 180 CAPSULE | Refills: 1 | Status: SHIPPED | OUTPATIENT
Start: 2025-07-09

## 2025-07-09 NOTE — TELEPHONE ENCOUNTER
Patient called with questions.    Please advise.    Patient called asking if her Cymbalta can be increased to 60mg - 30mg in the AM and 30mg in the PM. She reports she was dosing as such in the past and did not get sick taking it this way. She states she does not want to be on 60mg in the PM anymore due to getting sick from taking the medication this way.    Patient asked if she is able to get a note releasing her to return to work on July 24, 2025. She reports she is not able to return unless she has a doctors note. She states she was in the hospital for multiple massive seizures recently.    Patient asks if her short term disability paperwork has been completed and is ready for her to .

## 2025-07-09 NOTE — TELEPHONE ENCOUNTER
Patient called and notified medication sent into pharmacy. Return to work note will be up front for pickup and we are still waiting on Dr. Michel to sign off of the short term disability paperwork and will call her when it's ready.

## 2025-07-14 ENCOUNTER — TELEPHONE (OUTPATIENT)
Dept: PRIMARY CARE CLINIC | Age: 60
End: 2025-07-14

## 2025-07-14 ENCOUNTER — HOSPITAL ENCOUNTER (OUTPATIENT)
Dept: PHYSICAL THERAPY | Facility: CLINIC | Age: 60
Setting detail: THERAPIES SERIES
Discharge: HOME | End: 2025-07-14
Payer: COMMERCIAL

## 2025-07-14 DIAGNOSIS — G40.909 SEIZURE DISORDER (HCC): ICD-10-CM

## 2025-07-14 PROCEDURE — 97161 PT EVAL LOW COMPLEX 20 MIN: CPT

## 2025-07-14 PROCEDURE — 97110 THERAPEUTIC EXERCISES: CPT

## 2025-07-14 RX ORDER — PHENOBARBITAL 32.4 MG/1
64.8 TABLET ORAL 2 TIMES DAILY
Qty: 84 TABLET | Refills: 0 | Status: SHIPPED | OUTPATIENT
Start: 2025-07-14 | End: 2025-07-17 | Stop reason: SDUPTHER

## 2025-07-14 NOTE — CONSULTS
I have reviewed this plan of care and certify a need for medically necessary rehabilitation services.     *PLEASE SIGN ABOVE AND FAX BACK ALL PAGES*

## 2025-07-14 NOTE — TELEPHONE ENCOUNTER
Patient called in wanting to know if her short term disability forms are ready for pickup. Advised last update on 7/9/25 we are still waiting on Dr. Michel's signature. Sayge is not in today. Daniela messaged via Epic regarding the forms.

## 2025-07-16 DIAGNOSIS — R05.1 ACUTE COUGH: ICD-10-CM

## 2025-07-16 RX ORDER — GUAIFENESIN 600 MG/1
TABLET, EXTENDED RELEASE ORAL
Qty: 40 TABLET | Refills: 0 | Status: SHIPPED | OUTPATIENT
Start: 2025-07-16

## 2025-07-16 NOTE — TELEPHONE ENCOUNTER
Forms were signed on the 10th and faxed on the 14th - found in scan pile in phone room - patient notified via voicemail and forms scanned into chart.

## 2025-07-18 ENCOUNTER — HOSPITAL ENCOUNTER (OUTPATIENT)
Dept: PHYSICAL THERAPY | Facility: CLINIC | Age: 60
Setting detail: THERAPIES SERIES
Discharge: HOME | End: 2025-07-18
Payer: COMMERCIAL

## 2025-07-18 PROCEDURE — 97110 THERAPEUTIC EXERCISES: CPT

## 2025-07-18 NOTE — FLOWSHEET NOTE
[] [facility]  Outpatient Rehabilitation &  Therapy  [address]  [phone]  [phone] [] [facility]  Outpatient Rehabilitation &  Therapy  [address]  P: [phone]  F: [phone] [x] [facility]  Outpatient Rehabilitation &  Therapy  [address]  P: [phone]  F: [phone] [] [facility]  Outpatient Rehabilitation &  Therapy  [address]  [phone]  [phone] [] [facility]  Outpatient Rehabilitation &  Therapy  [address]   P: [phone]  F: [phone]  [] [facility]  Outpatient Rehabilitation &  Therapy  [address]  P: [phone]  F: [phone] [] [facility]  Outpatient Rehabilitation &  Therapy  [address]  P: [phone]  F: [phone] [] [facility]  Outpatient Rehabilitation &  Therapy  [address]  P: [phone]  F: [phone] [] [facility]  Outpatient Rehabilitation &  Therapy  [address]  P: [phone]  F: [phone]  [] [facility] Outpatient Rehabilitation &  Therapy  [address]  P: [phone]  F: [phone]     Physical Therapy Daily Treatment Note    Date:  2025  Patient Name:  Devi Mckinney    :  1965  MRN: [id number]  Physician: Jose G Michel MD                                     Insurance: [employer]; Sher yr; 60/60vs; no auth req; hard max; PT/OT/ST comb; no pt resp-ded met for    Medical Diagnosis: Weakness (R53.1)                      Rehab Codes: R29.6, M25.551  Onset Date: 25                                   Next 's appt: September  Visit# / total visits:     Cancels/No Shows: 20    Subjective:    Pain:  [x] Yes  [] No Location: neck, R hip, shoulders         Pain Rating: (0-10 scale) 6/10  Pain altered Tx:

## 2025-07-21 ENCOUNTER — HOSPITAL ENCOUNTER (OUTPATIENT)
Dept: PHYSICAL THERAPY | Facility: CLINIC | Age: 60
Setting detail: THERAPIES SERIES
Discharge: HOME | End: 2025-07-21
Payer: COMMERCIAL

## 2025-07-21 PROCEDURE — 97110 THERAPEUTIC EXERCISES: CPT

## 2025-07-21 NOTE — FLOWSHEET NOTE
[] [facility]  Outpatient Rehabilitation &  Therapy  [address]  [phone]  [phone] [] [facility]  Outpatient Rehabilitation &  Therapy  [address]  P: [phone]  F: [phone] [x] [facility]  Outpatient Rehabilitation &  Therapy  [address]  P: [phone]  F: [phone] [] [facility]  Outpatient Rehabilitation &  Therapy  [address]  [phone]  [phone] [] [facility]  Outpatient Rehabilitation &  Therapy  [address]   P: [phone]  F: [phone]  [] [facility]  Outpatient Rehabilitation &  Therapy  [address]  P: [phone]  F: [phone] [] [facility]  Outpatient Rehabilitation &  Therapy  [address]  P: [phone]  F: [phone] [] [facility]  Outpatient Rehabilitation &  Therapy  [address]  P: [phone]  F: [phone] [] [facility]  Outpatient Rehabilitation &  Therapy  [address]  P: [phone]  F: [phone]  [] [facility] Outpatient Rehabilitation &  Therapy  [address]  P: [phone]  F: [phone] [] [facility] Outpatient Rehabilitation &  Therapy  [address]  P: [phone]   F: [phone]      Therapy Cancel/No Show note    Date: 2025  Patient: Devi Mckinney  : 1965  MRN: [id number]    Cancels/No Shows to date:     For today's appointment patient:    [x]  Cancelled    [] Rescheduled appointment    [] No-show     Reason given by patient:    [x]  Patient ill    []  Conflicting appointment    [] No transportation      [] Conflict with work    [] No reason given    [] Weather related    [] COVID-19    [] Other:      Comments:        [] Next appointment was

## 2025-07-22 ENCOUNTER — HOSPITAL ENCOUNTER (EMERGENCY)
Age: 60
Discharge: HOME | End: 2025-07-22
Attending: EMERGENCY MEDICINE
Payer: COMMERCIAL

## 2025-07-22 ENCOUNTER — APPOINTMENT (OUTPATIENT)
Dept: CT IMAGING | Age: 60
End: 2025-07-22
Payer: COMMERCIAL

## 2025-07-22 VITALS
DIASTOLIC BLOOD PRESSURE: 102 MMHG | OXYGEN SATURATION: 96 % | RESPIRATION RATE: 19 BRPM | TEMPERATURE: 98 F | SYSTOLIC BLOOD PRESSURE: 149 MMHG | HEART RATE: 80 BPM

## 2025-07-22 DIAGNOSIS — S09.90XA INJURY OF HEAD, INITIAL ENCOUNTER: Primary | ICD-10-CM

## 2025-07-22 PROCEDURE — 70450 CT HEAD/BRAIN W/O DYE: CPT

## 2025-07-22 PROCEDURE — 93005 ELECTROCARDIOGRAM TRACING: CPT | Performed by: EMERGENCY MEDICINE

## 2025-07-22 PROCEDURE — 72125 CT NECK SPINE W/O DYE: CPT

## 2025-07-22 PROCEDURE — 6370000000 HC RX 637 (ALT 250 FOR IP)

## 2025-07-22 PROCEDURE — 99284 EMERGENCY DEPT VISIT MOD MDM: CPT

## 2025-07-22 RX ORDER — IBUPROFEN 800 MG/1
800 TABLET, FILM COATED ORAL ONCE
Status: COMPLETED | OUTPATIENT
Start: 2025-07-22 | End: 2025-07-22

## 2025-07-22 RX ORDER — ACETAMINOPHEN 500 MG
1000 TABLET ORAL ONCE
Status: COMPLETED | OUTPATIENT
Start: 2025-07-22 | End: 2025-07-22

## 2025-07-22 RX ADMIN — ACETAMINOPHEN 1000 MG: 500 TABLET ORAL at 17:03

## 2025-07-22 RX ADMIN — IBUPROFEN 800 MG: 800 TABLET, FILM COATED ORAL at 17:04

## 2025-07-22 ASSESSMENT — PAIN DESCRIPTION - LOCATION: LOCATION: HEAD

## 2025-07-22 ASSESSMENT — PAIN - FUNCTIONAL ASSESSMENT: PAIN_FUNCTIONAL_ASSESSMENT: 0-10

## 2025-07-22 ASSESSMENT — PAIN SCALES - GENERAL: PAINLEVEL_OUTOF10: 8

## 2025-07-22 NOTE — ED NOTES
Pt presented to ED ambulatory from ems   Pt presents with C/O of a mechanical fall and hitting her head   Pt has a hematoma to the right back side of the head   Pt states she has no other C/O than head pain .  Pt denies any dizziness , pt stated she tripped and fell down a ditch picking flowers .  Pt has a hx of seizures   Pt has a C-collar on from EMS .  Pt is Alert and oriented. Pt is resting comfortably on stretcher with call light in reach.  No acute distress noted. Respirations are even and unlabored.  White board updated. Will continue to follow plan of care.

## 2025-07-22 NOTE — DISCHARGE INSTRUCTIONS
You are seen today after a mechanical fall.  You received imaging of your head and neck which showed no fractures or abnormalities inside the brain.  During our assessment we determined that it was safe to remove your c-collar.  If you develop worsening headache, blurry vision, numbness, tingling, difficulty walking, fevers, neck pain, please return to the ER immediately for further evaluation.

## 2025-07-22 NOTE — ED PROVIDER NOTES
[facility] EMERGENCY DEPARTMENT     Emergency Department     Faculty Attestation        I performed a history and physical examination of the patient and discussed management with the resident. I reviewed the resident’s note and agree with the documented findings and plan of care. Any areas of disagreement are noted on the chart. I was personally present for the key portions of any procedures. I have documented in the chart those procedures where I was not present during the key portions. I have reviewed the emergency nurses triage note. I agree with the chief complaint, past medical history, past surgical history, allergies, medications, social and family history as documented unless otherwise noted below.  For Physician Assistant/ Nurse Practitioner cases/documentation I have personally evaluated this patient and have completed at least one if not all key elements of the E/M (history, physical exam, and MDM). Additional findings are as noted.      Vital Signs: BP: (!) 144/103  Pulse: 80  Respirations: 19  Temp: 98 °F (36.7 °C) SpO2: 98 %  PCP:  Jose G Michel MD  Note Started: 7/22/25, 4:44 PM EDT    Pertinent Comments:             EKG Interpretation    Interpreted by emergency department physician    Rhythm: normal sinus   Rate: normal at 79 bpm  Axis: normal  Conduction: normal  ST Segments: no acute change  T Waves: no acute change  Q Waves: no acute change    Clinical Impression:  nonspecific EKG.        Critical Care  None      (Please note that portions of this note were completed with a voice recognition program. Efforts were made to edit the dictations but occasionally words are mis-transcribed. Whenever words are used in this note in any gender, they shall be construed as though they were used in the gender appropriate to the circumstances; and whenever words are used in this note in the singular or plural form, they shall be construed as though

## 2025-07-22 NOTE — ED PROVIDER NOTES
[facility] EMERGENCY DEPARTMENT  Emergency Department Encounter  Emergency Medicine Resident     Pt Name:Devi Mckinney  MRN: [id number]  Birthdate 1965  Date of evaluation: 25  PCP:  Jose G Michel MD  Note Started: 7:18 PM EDT      CHIEF COMPLAINT       Chief Complaint   Patient presents with    Fall       HISTORY OF PRESENT ILLNESS  (Location/Symptom, Timing/Onset, Context/Setting, Quality, Duration, Modifying Factors, Severity.)      Devi Mckinney is a 59 y.o. female who presents after a mechanical fall in her garden prior to arrival.  She denies chest pain, shortness of breath, syncope, loss of consciousness, amnesia, numbness, tingling, weakness, neck pain.  She affirms tenderness to her posterior head where there is a small bruise.    PAST MEDICAL / SURGICAL / SOCIAL / FAMILY HISTORY      has a past medical history of Hx of psychiatric care, Hyperlipidemia, Hypertension, Migraines, Osteoarthritis, Osteoporosis, Seizures (HCC), Suicide attempt (HCC), and Vertigo.       has a past surgical history that includes shoulder surgery (Left, ); Mandible fracture surgery;  section; Hysterectomy, total abdominal; Total knee arthroplasty (Right, 2019); Total knee arthroplasty (Left, 2017); Cholecystectomy, laparoscopic (); Hip Arthroplasty (Right, 2022); and hip surgery (Right, 2022).      Social History     Socioeconomic History    Marital status:      Spouse name: Not on file    Number of children: Not on file    Years of education: Not on file    Highest education level: Not on file   Occupational History    Not on file   Tobacco Use    Smoking status: Never    Smokeless tobacco: Never   Vaping Use    Vaping status: Never Used   Substance and Sexual Activity    Alcohol use: Not Currently     Comment: Socially    Drug use: Never    Sexual activity: Not Currently   Other Topics Concern    Not on file   Social History Narrative    Not on file

## 2025-07-23 LAB
EKG ATRIAL RATE: 79 BPM
EKG P AXIS: 24 DEGREES
EKG P-R INTERVAL: 120 MS
EKG Q-T INTERVAL: 362 MS
EKG QRS DURATION: 70 MS
EKG QTC CALCULATION (BAZETT): 415 MS
EKG R AXIS: 27 DEGREES
EKG T AXIS: 56 DEGREES
EKG VENTRICULAR RATE: 79 BPM

## 2025-07-23 PROCEDURE — 93010 ELECTROCARDIOGRAM REPORT: CPT | Performed by: INTERNAL MEDICINE

## 2025-07-28 ENCOUNTER — HOSPITAL ENCOUNTER (OUTPATIENT)
Dept: PHYSICAL THERAPY | Facility: CLINIC | Age: 60
Setting detail: THERAPIES SERIES
Discharge: HOME | End: 2025-07-28
Payer: COMMERCIAL

## 2025-07-28 PROCEDURE — 97110 THERAPEUTIC EXERCISES: CPT

## 2025-07-28 NOTE — FLOWSHEET NOTE
and slipped on the embankment, tumbled and did hit her head. Was cleared at the ER but just sore after. Feeling better since then.   Pain:  [x] Yes  [] No Location: neck, R hip, shoulders         Pain Rating: (0-10 scale) 4/10  Pain altered Tx:  [x] No  [] Yes  Action:  Comments:     Objective:    Today's Treatment:  Modalities: none  Precautions [] No  [x] Yes: seizure disorder, right hip pain, fatigues rapidly, fall risk   Exercise Reps/ Time Weight/ Level Comments   NuStep 7m L2           SB calf stretch 3x15\"  L2     Seated HS S 3x20\"           Piriformis stretch 3x20\"     Heel raises 2x10    seated   Toe raises 2x10    seated   3-way kicks x10   Abd only                       LAQ  2x10       TA contraction 10x5\"     TA + march 10x ea     Bridge with TA 3x5       Hook lying hip abduction 2x10                           Bicep curls x15 lime     Triceps with band x10 lime     Scap retraction 10x5\"     Tband Row 15x     ER with scapular retraction -                 Tandem standing -       Romberg standing -       Toe taps -                 Review home ex x       Other:         Treatment Charges: Mins Units Time In/Out   []  Modalities        [x]  Ther Exercise 33 2    []  Neuromuscular Re-ed      []  Gait Training      []  Manual Therapy      []  Ther Activities      []  Aquatics      []  Vasocompression      []  Cervical Traction      []  Other      Total Billable time 33 min 2           Assessment: [x] Progressing toward goals. Able to make a few small progressions to pt's strengthening program this date. Does still fatigue quickly with bridges and scapular retractions are aggravating to L/R shoulder. Demonstrates fair recall throughout program. Resumed use of tband resistance for biceps curls and completed tband rows as well. Fatigued post ex.     [] No change.     [] Other:  [x] Patient would continue to benefit from skilled physical therapy services in order to: improve strength to allow for return to ADL and

## 2025-08-01 ENCOUNTER — HOSPITAL ENCOUNTER (OUTPATIENT)
Dept: PHYSICAL THERAPY | Facility: CLINIC | Age: 60
Setting detail: THERAPIES SERIES
Discharge: HOME | End: 2025-08-01
Payer: COMMERCIAL

## 2025-08-01 PROCEDURE — 97110 THERAPEUTIC EXERCISES: CPT

## 2025-08-01 NOTE — PROGRESS NOTES
[] [facility]  Outpatient Rehabilitation &  Therapy  [address]  [phone]  [phone] [] [facility]  Outpatient Rehabilitation &  Therapy  [address]  P: [phone]  F: [phone] [x] [facility]  Outpatient Rehabilitation &  Therapy  [address]  P: [phone]  F: [phone] [] [facility]  Outpatient Rehabilitation &  Therapy  [address]  [phone]  [phone] [] [facility]  Outpatient Rehabilitation &  Therapy  [address]   P: [phone]  F: [phone]  [] [facility]  Outpatient Rehabilitation &  Therapy  [address]  P: [phone]  F: [phone] [] [facility]  Outpatient Rehabilitation &  Therapy  [address]  P: [phone]  F: [phone] [] [facility]  Outpatient Rehabilitation &  Therapy  [address]  P: [phone]  F: [phone] [] [facility]  Outpatient Rehabilitation &  Therapy  [address]  P: [phone]  F: [phone]  [] [facility] Outpatient Rehabilitation &  Therapy  [address]  P: [phone]  F: [phone]     Physical Therapy Daily Treatment Note/Progress Note    Date:  2025  Patient Name:  Devi Mckinney    :  1965  MRN: [id number]  Physician: Jose G Michel MD                                     Insurance: [employer]; Sher yr; 60/60vs; no auth req; hard max; PT/OT/ST comb; no pt resp-ded met for    Medical Diagnosis: Weakness (R53.1)                      Rehab Codes: R29.6, M25.551  Onset Date: 25                                   Next 's appt: September  Visit# / total visits:     Cancels/No Shows:     Subjective:    Pain:  [x] Yes  [] No Location: neck and back         Pain Rating: (0-10 scale) 410  Pain altered

## 2025-08-05 RX ORDER — DULOXETIN HYDROCHLORIDE 30 MG/1
30 CAPSULE, DELAYED RELEASE ORAL 2 TIMES DAILY
Qty: 180 CAPSULE | Refills: 1 | Status: SHIPPED | OUTPATIENT
Start: 2025-08-05

## 2025-08-11 ENCOUNTER — OFFICE VISIT (OUTPATIENT)
Dept: PRIMARY CARE CLINIC | Age: 60
End: 2025-08-11
Payer: COMMERCIAL

## 2025-08-11 VITALS
WEIGHT: 136.4 LBS | BODY MASS INDEX: 25.1 KG/M2 | DIASTOLIC BLOOD PRESSURE: 86 MMHG | OXYGEN SATURATION: 98 % | HEIGHT: 62 IN | SYSTOLIC BLOOD PRESSURE: 138 MMHG | HEART RATE: 83 BPM

## 2025-08-11 DIAGNOSIS — R06.02 SHORTNESS OF BREATH: ICD-10-CM

## 2025-08-11 DIAGNOSIS — G40.909 SEIZURE DISORDER (HCC): Primary | ICD-10-CM

## 2025-08-11 DIAGNOSIS — R53.1 WEAKNESS: ICD-10-CM

## 2025-08-11 PROCEDURE — 99214 OFFICE O/P EST MOD 30 MIN: CPT | Performed by: FAMILY MEDICINE

## 2025-08-11 PROCEDURE — 1036F TOBACCO NON-USER: CPT | Performed by: FAMILY MEDICINE

## 2025-08-11 PROCEDURE — G8427 DOCREV CUR MEDS BY ELIG CLIN: HCPCS | Performed by: FAMILY MEDICINE

## 2025-08-11 PROCEDURE — G8420 CALC BMI NORM PARAMETERS: HCPCS | Performed by: FAMILY MEDICINE

## 2025-08-11 PROCEDURE — 3017F COLORECTAL CA SCREEN DOC REV: CPT | Performed by: FAMILY MEDICINE

## 2025-08-11 RX ORDER — MECLIZINE HYDROCHLORIDE 25 MG/1
25 TABLET ORAL 3 TIMES DAILY PRN
Qty: 90 TABLET | Refills: 1 | Status: SHIPPED | OUTPATIENT
Start: 2025-08-11

## 2025-08-11 RX ORDER — CALCIUM CARBONATE/VITAMIN D3 600 MG-10
1 TABLET ORAL EVERY MORNING
COMMUNITY
Start: 2025-06-17

## 2025-08-11 ASSESSMENT — ENCOUNTER SYMPTOMS
COUGH: 0
ABDOMINAL PAIN: 0
EYE DISCHARGE: 0
VOMITING: 0
WHEEZING: 0
SHORTNESS OF BREATH: 1
RHINORRHEA: 0
NAUSEA: 0
DIARRHEA: 0
EYE REDNESS: 0
SORE THROAT: 0

## 2025-08-12 DIAGNOSIS — R06.02 SHORTNESS OF BREATH: ICD-10-CM

## 2025-08-13 RX ORDER — ALBUTEROL SULFATE 90 UG/1
INHALANT RESPIRATORY (INHALATION)
Qty: 6.7 G | Refills: 3 | Status: SHIPPED | OUTPATIENT
Start: 2025-08-13

## 2025-08-13 RX ORDER — SUMATRIPTAN SUCCINATE 100 MG/1
TABLET ORAL
Qty: 30 TABLET | Refills: 0 | Status: SHIPPED | OUTPATIENT
Start: 2025-08-13

## 2025-08-27 ENCOUNTER — HOSPITAL ENCOUNTER (OUTPATIENT)
Age: 60
Discharge: HOME | End: 2025-08-29
Attending: FAMILY MEDICINE
Payer: COMMERCIAL

## 2025-08-27 ENCOUNTER — HOSPITAL ENCOUNTER (OUTPATIENT)
Dept: PULMONOLOGY | Age: 60
Discharge: HOME | End: 2025-08-27
Attending: FAMILY MEDICINE
Payer: COMMERCIAL

## 2025-08-27 DIAGNOSIS — R06.02 SHORTNESS OF BREATH: ICD-10-CM

## 2025-08-27 DIAGNOSIS — R53.1 WEAKNESS: ICD-10-CM

## 2025-08-27 LAB
ECHO AO ASC DIAM: 3 CM
ECHO AO ROOT DIAM: 2.4 CM
ECHO AV AREA PEAK VELOCITY: 1.7 CM2
ECHO AV AREA VTI: 1.8 CM2
ECHO AV MEAN GRADIENT: 4 MMHG
ECHO AV MEAN VELOCITY: 1 M/S
ECHO AV PEAK GRADIENT: 10 MMHG
ECHO AV PEAK VELOCITY: 1.6 M/S
ECHO AV VELOCITY RATIO: 0.63
ECHO AV VTI: 32.7 CM
ECHO EST RA PRESSURE: 3 MMHG
ECHO IVC PROX: 1 CM
ECHO LA AREA 2C: 20.7 CM2
ECHO LA AREA 4C: 16.8 CM2
ECHO LA DIAMETER: 3.6 CM
ECHO LA MAJOR AXIS: 5.6 CM
ECHO LA MINOR AXIS: 5.9 CM
ECHO LA TO AORTIC ROOT RATIO: 1.5
ECHO LA VOL BP: 51 ML (ref 22–52)
ECHO LA VOL MOD A2C: 61 ML (ref 22–52)
ECHO LA VOL MOD A4C: 41 ML (ref 22–52)
ECHO LV E' LATERAL VELOCITY: 12.9 CM/S
ECHO LV E' SEPTAL VELOCITY: 8.16 CM/S
ECHO LV EDV 3D: 89 ML
ECHO LV EF PHYSICIAN: 61 %
ECHO LV EJECTION FRACTION 3D: 61 %
ECHO LV ESV 3D: 35 ML
ECHO LV FRACTIONAL SHORTENING: 43 % (ref 28–44)
ECHO LV GLOBAL LONGITUDINAL STRAIN (GLS): -22.4 %
ECHO LV INTERNAL DIMENSION DIASTOLIC: 3.7 CM (ref 3.9–5.3)
ECHO LV INTERNAL DIMENSION SYSTOLIC: 2.1 CM
ECHO LV IVSD: 0.7 CM (ref 0.6–0.9)
ECHO LV MASS 2D: 68.8 G (ref 67–162)
ECHO LV MASS 3D: 96 G
ECHO LV POSTERIOR WALL DIASTOLIC: 0.7 CM (ref 0.6–0.9)
ECHO LV RELATIVE WALL THICKNESS RATIO: 0.38
ECHO LVOT AREA: 2.5 CM2
ECHO LVOT AV VTI INDEX: 0.72
ECHO LVOT DIAM: 1.8 CM
ECHO LVOT MEAN GRADIENT: 2 MMHG
ECHO LVOT PEAK GRADIENT: 4 MMHG
ECHO LVOT PEAK VELOCITY: 1 M/S
ECHO LVOT SV: 60.3 ML
ECHO LVOT VTI: 23.7 CM
ECHO MV A VELOCITY: 0.9 M/S
ECHO MV AREA VTI: 2.3 CM2
ECHO MV E DECELERATION TIME (DT): 211 MS
ECHO MV E VELOCITY: 0.89 M/S
ECHO MV E/A RATIO: 0.99
ECHO MV E/E' LATERAL: 6.9
ECHO MV E/E' RATIO (AVERAGED): 8.9
ECHO MV E/E' SEPTAL: 10.91
ECHO MV LVOT VTI INDEX: 1.12
ECHO MV MAX VELOCITY: 0.9 M/S
ECHO MV MEAN GRADIENT: 2 MMHG
ECHO MV MEAN VELOCITY: 0.6 M/S
ECHO MV PEAK GRADIENT: 3 MMHG
ECHO MV VTI: 26.5 CM
ECHO PV MAX VELOCITY: 0.8 M/S
ECHO PV PEAK GRADIENT: 2 MMHG
ECHO RIGHT VENTRICULAR SYSTOLIC PRESSURE (RVSP): 25 MMHG
ECHO RV BASAL DIMENSION: 3.1 CM
ECHO RV FREE WALL PEAK S': 11.2 CM/S
ECHO RV TAPSE: 2.5 CM (ref 1.7–?)
ECHO TV REGURGITANT MAX VELOCITY: 2.32 M/S
ECHO TV REGURGITANT PEAK GRADIENT: 22 MMHG

## 2025-08-27 PROCEDURE — 93356 MYOCRD STRAIN IMG SPCKL TRCK: CPT | Performed by: INTERNAL MEDICINE

## 2025-08-27 PROCEDURE — 94726 PLETHYSMOGRAPHY LUNG VOLUMES: CPT

## 2025-08-27 PROCEDURE — 94640 AIRWAY INHALATION TREATMENT: CPT

## 2025-08-27 PROCEDURE — 94060 EVALUATION OF WHEEZING: CPT

## 2025-08-27 PROCEDURE — 93306 TTE W/DOPPLER COMPLETE: CPT | Performed by: INTERNAL MEDICINE

## 2025-08-27 PROCEDURE — 94664 DEMO&/EVAL PT USE INHALER: CPT

## 2025-08-27 PROCEDURE — 94729 DIFFUSING CAPACITY: CPT

## 2025-08-27 PROCEDURE — 93306 TTE W/DOPPLER COMPLETE: CPT

## (undated) DEVICE — ADHESIVE SKIN CLOSURE TOP 36 CC HI VISC DERMBND MINI

## (undated) DEVICE — SOLUTION IRRIG 3000ML 0.9% SOD CHL USP UROMATIC PLAS CONT

## (undated) DEVICE — GLOVE ORANGE PI 8 1/2   MSG9085

## (undated) DEVICE — YANKAUER,FLEXIBLE HANDLE,REGLR CAPACITY: Brand: MEDLINE INDUSTRIES, INC.

## (undated) DEVICE — NEPTUNE E-SEP SMOKE EVACUATION PENCIL, COATED, 70MM BLADE, PUSH BUTTON SWITCH: Brand: NEPTUNE E-SEP

## (undated) DEVICE — GLOVE ORANGE PI 8   MSG9080

## (undated) DEVICE — SUTURE VCRL SZ 0 L18IN ABSRB UD L36MM CT-1 1/2 CIR J840D

## (undated) DEVICE — STERILE PVP: Brand: MEDLINE INDUSTRIES, INC.

## (undated) DEVICE — NEPTUNE E-SEP 165MM SUCTION SLEEVE: Brand: NEPTUNE E-SEP

## (undated) DEVICE — TUBING AMB

## (undated) DEVICE — STRAP,CATHETER,ELASTIC,HOOK&LOOP: Brand: MEDLINE

## (undated) DEVICE — DRESSING TRNSPAR W5XL4.5IN FLM SHT SEMIPERMEABLE WIND

## (undated) DEVICE — GLOVE ORTHO 8   MSG9480

## (undated) DEVICE — 6619 2 PTNT ISO SYS INCISE AREA&LT;(&GT;&&LT;)&GT;P: Brand: STERI-DRAPE™ IOBAN™ 2

## (undated) DEVICE — ZIPPERED TOGA, X-LARGE: Brand: FLYTE

## (undated) DEVICE — ZIPPERED TOGA, 2X LARGE: Brand: FLYTE

## (undated) DEVICE — GAUZE,SPONGE,FLUFF,6"X6.75",STRL,5/TRAY: Brand: MEDLINE

## (undated) DEVICE — SVMMC ORTH SPL DRP PK

## (undated) DEVICE — SOLUTION SCRB 4OZ 4% CHG H2O AIDED FOR PREOPERATIVE SKIN

## (undated) DEVICE — OSCILLATING TIP SAW CARTRIDGE: Brand: PRECISION FALCON

## (undated) DEVICE — GLOVE SURG SZ 65 THK91MIL LTX FREE SYN POLYISOPRENE

## (undated) DEVICE — COVER,MAYO STAND,STERILE: Brand: MEDLINE

## (undated) DEVICE — PROVE COVER: Brand: UNBRANDED

## (undated) DEVICE — SUTURE VCRL SZ 2-0 L18IN ABSRB UD CT-1 L36MM 1/2 CIR J839D

## (undated) DEVICE — C-ARM: Brand: UNBRANDED

## (undated) DEVICE — DRESSING FOAM W4XL10IN AG SIL ADH ANTIMIC POSTOP OPTIFOAM

## (undated) DEVICE — GLOVE ORANGE PI 7 1/2   MSG9075

## (undated) DEVICE — APPLICATOR MEDICATED 26 CC SOLUTION HI LT ORNG CHLORAPREP

## (undated) DEVICE — CYSTO/BLADDER IRRIGATION SET, REGULATING CLAMP

## (undated) DEVICE — SOLUTION IV IRRIG POUR BRL 0.9% SODIUM CHL 2F7124

## (undated) DEVICE — BANDAGE COBAN 6 IN WND 6INX5YD FOAM

## (undated) DEVICE — BLADE ES L6IN ELASTOMERIC COAT EXT DURABLE BEND UPTO 90DEG

## (undated) DEVICE — DRAPE,U/ SHT,SPLIT,PLAS,STERIL: Brand: MEDLINE

## (undated) DEVICE — BIPOLAR SEALER 23-112-1 AQM 6.0: Brand: AQUAMANTYS ®